# Patient Record
Sex: MALE | Race: WHITE | Employment: OTHER | URBAN - METROPOLITAN AREA
[De-identification: names, ages, dates, MRNs, and addresses within clinical notes are randomized per-mention and may not be internally consistent; named-entity substitution may affect disease eponyms.]

---

## 2017-11-14 PROBLEM — J96.21 ACUTE ON CHRONIC RESPIRATORY FAILURE WITH HYPOXIA AND HYPERCAPNIA (HCC): Status: ACTIVE | Noted: 2017-11-14

## 2017-11-14 PROBLEM — G93.41 ACUTE METABOLIC ENCEPHALOPATHY: Status: ACTIVE | Noted: 2017-11-14

## 2017-11-14 PROBLEM — I50.9 CHF (CONGESTIVE HEART FAILURE) (HCC): Status: ACTIVE | Noted: 2017-11-14

## 2017-11-14 PROBLEM — D64.9 ANEMIA: Status: ACTIVE | Noted: 2017-11-14

## 2017-11-14 PROBLEM — I25.5 ISCHEMIC CARDIOMYOPATHY: Status: ACTIVE | Noted: 2017-11-14

## 2017-11-14 PROBLEM — D72.829 LEUKOCYTOSIS: Status: ACTIVE | Noted: 2017-11-14

## 2017-11-14 PROBLEM — J96.01 ACUTE RESPIRATORY FAILURE WITH HYPOXIA AND HYPERCAPNIA (HCC): Status: ACTIVE | Noted: 2017-11-14

## 2017-11-14 PROBLEM — E11.9 DIABETES (HCC): Status: ACTIVE | Noted: 2017-11-14
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
## 2017-11-14 PROBLEM — R79.89 ELEVATED BRAIN NATRIURETIC PEPTIDE (BNP) LEVEL: Status: ACTIVE | Noted: 2017-11-14

## 2017-11-14 PROBLEM — J96.02 ACUTE RESPIRATORY FAILURE WITH HYPOXIA AND HYPERCAPNIA (HCC): Status: ACTIVE | Noted: 2017-11-14

## 2017-11-14 PROBLEM — Z95.810 ICD (IMPLANTABLE CARDIOVERTER-DEFIBRILLATOR) IN PLACE: Status: ACTIVE | Noted: 2017-11-14

## 2017-11-14 PROBLEM — R77.8 ELEVATED TROPONIN: Status: ACTIVE | Noted: 2017-11-14

## 2017-11-14 PROBLEM — N18.9 CKD (CHRONIC KIDNEY DISEASE): Status: ACTIVE | Noted: 2017-11-14

## 2017-11-14 PROBLEM — I95.9 HYPOTENSION: Status: ACTIVE | Noted: 2017-11-14

## 2017-11-14 PROBLEM — J44.1 COPD EXACERBATION (HCC): Status: ACTIVE | Noted: 2017-11-14

## 2017-11-14 PROBLEM — J96.22 ACUTE ON CHRONIC RESPIRATORY FAILURE WITH HYPOXIA AND HYPERCAPNIA (HCC): Status: ACTIVE | Noted: 2017-11-14

## 2017-11-14 PROBLEM — N17.9 ACUTE RENAL FAILURE (ARF) (HCC): Status: ACTIVE | Noted: 2017-11-14

## 2017-11-16 PROBLEM — N18.30 STAGE 3 CHRONIC KIDNEY DISEASE (HCC): Status: ACTIVE | Noted: 2017-11-14

## 2017-12-13 RX ORDER — SODIUM CHLORIDE, SODIUM LACTATE, POTASSIUM CHLORIDE, CALCIUM CHLORIDE 600; 310; 30; 20 MG/100ML; MG/100ML; MG/100ML; MG/100ML
INJECTION, SOLUTION INTRAVENOUS CONTINUOUS
Status: CANCELLED | OUTPATIENT
Start: 2017-12-13

## 2017-12-14 ENCOUNTER — HOSPITAL ENCOUNTER (OUTPATIENT)
Dept: SURGERY | Age: 82
Discharge: HOME | End: 2017-12-14
Attending: OPHTHALMOLOGY | Admitting: OPHTHALMOLOGY

## 2017-12-22 ENCOUNTER — HOSPITAL ENCOUNTER (OUTPATIENT)
Dept: SURGERY | Age: 82
Discharge: HOME | End: 2018-01-10
Attending: OPHTHALMOLOGY | Admitting: OPHTHALMOLOGY

## 2018-01-05 PROBLEM — N18.30 CKD (CHRONIC KIDNEY DISEASE), STAGE III (HCC): Status: ACTIVE | Noted: 2018-01-05

## 2018-01-09 PROBLEM — J96.22 ACUTE ON CHRONIC RESPIRATORY FAILURE WITH HYPOXIA AND HYPERCAPNIA (HCC): Status: RESOLVED | Noted: 2017-11-14 | Resolved: 2018-01-09

## 2018-01-09 PROBLEM — J96.21 ACUTE ON CHRONIC RESPIRATORY FAILURE WITH HYPOXIA AND HYPERCAPNIA (HCC): Status: RESOLVED | Noted: 2017-11-14 | Resolved: 2018-01-09

## 2018-01-11 ENCOUNTER — TELEPHONE (OUTPATIENT)
Dept: TELEMETRY | Age: 83
End: 2018-01-11

## 2018-01-11 NOTE — TELEPHONE ENCOUNTER
[address]    SYMPTOM ASSESSMENT: Post discharge follow-up phone call made to patient. Patient denies shortness of breath, chest pain, edema, or difficulty sleeping; stated he has been feeling \"just fine\" since discharge. Documented discharge weight was 222 lbs (although I question accuracy based on previous hospital weights); today's weight on patient's home scale was 233 lbs. Patient states he did trip and fall this morning over a cart in the kitchen; denies loss of consciousness, denies dizziness; commented he has \"a few scrapes\" but denies any other concerns. Patient states his blood sugars have been controlled, reports recent reading at 125 and 130. MEDICATION REVIEW: Patient was able to fill all prescriptions and states he has been taking medications as prescribed. Reviewed patient medications; no discrepancies identified. FOLLOW-UP APPOINTMENT: Reminded patient of his appointment with Dr. Fany Suarez scheduled for tomorrow, January 12 at 11:15 AM. Patient has arranged transportation to scheduled appointment with his daughter. Patient states he has not yet heard from [facility]. EDUCATION: Educated patient on sodium restriction of < 3,000 mg and fluid restriction of < 64 oz, daily weights, follow-up, and medication compliance. Reviewed recommended level of activity. Notified patient to call the doctor post discharge if he experiences shortness of breath, chest pain, swelling, cough, or weight gain or loss of 2-3 pounds in a day/five pounds in a week. Also notified patient to call the doctor if he feels dizzy, increased fatigue, decreased or difficulty urinating. Pt verbalized understanding; stated he will call the doctor with any questions or signs of symptom worsening. No additional questions at this time.  HF resource number made available for non-urgent questions.    ------------------    Additional call placed to [facility]; spoke with Ella Reyes who states patient is
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
## 2018-04-12 PROBLEM — R77.8 ELEVATED TROPONIN: Status: RESOLVED | Noted: 2017-11-14 | Resolved: 2018-04-12

## 2018-04-20 PROBLEM — I50.43 CHF (CONGESTIVE HEART FAILURE), NYHA CLASS I, ACUTE ON CHRONIC, COMBINED (HCC): Status: ACTIVE | Noted: 2018-04-20

## 2018-04-27 ENCOUNTER — TELEPHONE (OUTPATIENT)
Dept: CARDIAC REHAB | Age: 83
End: 2018-04-27

## 2018-05-10 PROBLEM — I50.43 ACUTE ON CHRONIC COMBINED SYSTOLIC AND DIASTOLIC CHF (CONGESTIVE HEART FAILURE) (HCC): Status: ACTIVE | Noted: 2018-05-10

## 2018-05-11 ENCOUNTER — PROCEDURE VISIT (OUTPATIENT)
Dept: CARDIOLOGY CLINIC | Age: 83
End: 2018-05-11

## 2018-05-11 DIAGNOSIS — Z95.810 ICD (IMPLANTABLE CARDIOVERTER-DEFIBRILLATOR) IN PLACE: Primary | ICD-10-CM

## 2018-05-11 PROBLEM — Z99.81 ON HOME O2: Status: ACTIVE | Noted: 2018-05-11

## 2018-05-11 PROBLEM — J96.11 CHRONIC RESPIRATORY FAILURE WITH HYPOXIA (HCC): Status: ACTIVE | Noted: 2018-05-11

## 2018-05-11 PROBLEM — J44.9 COPD (CHRONIC OBSTRUCTIVE PULMONARY DISEASE) (HCC): Status: ACTIVE | Noted: 2018-05-11

## 2018-05-13 PROBLEM — T17.500A MUCUS PLUGGING OF BRONCHI: Status: ACTIVE | Noted: 2018-05-13

## 2018-05-13 PROBLEM — R06.89 INEFFECTIVE AIRWAY CLEARANCE: Status: ACTIVE | Noted: 2018-05-13

## 2018-05-13 PROBLEM — R09.89 PULMONARY CONGESTION: Status: ACTIVE | Noted: 2018-05-13

## 2018-05-13 PROBLEM — N18.9 CHRONIC KIDNEY DISEASE: Status: ACTIVE | Noted: 2018-01-05

## 2018-05-14 PROBLEM — J98.01 BRONCHOSPASM: Status: ACTIVE | Noted: 2018-05-14

## 2018-05-16 ENCOUNTER — TELEPHONE (OUTPATIENT)
Dept: INPATIENT UNIT | Age: 83
End: 2018-05-16

## 2018-05-20 PROBLEM — A41.9 SEPSIS (HCC): Status: ACTIVE | Noted: 2018-05-20

## 2018-05-20 PROBLEM — I25.5 ISCHEMIC CARDIOMYOPATHY: Chronic | Status: ACTIVE | Noted: 2017-11-14

## 2018-05-20 PROBLEM — I95.9 HYPOTENSION: Status: RESOLVED | Noted: 2017-11-14 | Resolved: 2018-05-20

## 2018-05-20 PROBLEM — J98.01 BRONCHOSPASM: Status: RESOLVED | Noted: 2018-05-14 | Resolved: 2018-05-20

## 2018-05-20 PROBLEM — N17.9 ACUTE RENAL FAILURE (HCC): Status: RESOLVED | Noted: 2017-11-14 | Resolved: 2018-05-20

## 2018-05-20 PROBLEM — J96.21 ACUTE ON CHRONIC RESPIRATORY FAILURE WITH HYPOXEMIA (HCC): Status: ACTIVE | Noted: 2018-05-20

## 2018-05-20 PROBLEM — N18.30 CKD (CHRONIC KIDNEY DISEASE) STAGE 3, GFR 30-59 ML/MIN (HCC): Chronic | Status: ACTIVE | Noted: 2018-01-05

## 2018-05-20 PROBLEM — N18.9 ACUTE-ON-CHRONIC RENAL FAILURE (HCC): Status: ACTIVE | Noted: 2018-05-20

## 2018-05-20 PROBLEM — N17.0 ACUTE RENAL FAILURE WITH TUBULAR NECROSIS (HCC): Status: ACTIVE | Noted: 2017-11-14

## 2018-05-20 PROBLEM — J44.9 COPD (CHRONIC OBSTRUCTIVE PULMONARY DISEASE) (HCC): Chronic | Status: ACTIVE | Noted: 2018-05-11

## 2018-05-20 PROBLEM — Z99.81 ON HOME O2: Status: RESOLVED | Noted: 2018-05-11 | Resolved: 2018-05-20

## 2018-05-20 PROBLEM — I50.43 CHF (CONGESTIVE HEART FAILURE), NYHA CLASS I, ACUTE ON CHRONIC, COMBINED (HCC): Status: RESOLVED | Noted: 2018-04-20 | Resolved: 2018-05-20

## 2018-05-20 PROBLEM — R09.89 PULMONARY CONGESTION: Status: RESOLVED | Noted: 2018-05-13 | Resolved: 2018-05-20

## 2018-05-20 PROBLEM — G93.40 ACUTE ENCEPHALOPATHY: Status: ACTIVE | Noted: 2018-05-20

## 2018-05-20 PROBLEM — I51.89 RIGHT VENTRICULAR SYSTOLIC DYSFUNCTION: Chronic | Status: ACTIVE | Noted: 2018-05-20

## 2018-05-20 PROBLEM — D72.829 LEUKOCYTOSIS: Status: RESOLVED | Noted: 2017-11-14 | Resolved: 2018-05-20

## 2018-05-20 PROBLEM — J96.11 CHRONIC HYPOXEMIC RESPIRATORY FAILURE (HCC): Chronic | Status: ACTIVE | Noted: 2018-05-11

## 2018-05-20 PROBLEM — J44.1 COPD EXACERBATION (HCC): Status: RESOLVED | Noted: 2017-11-14 | Resolved: 2018-05-20

## 2018-05-20 PROBLEM — R65.21 SEPTIC SHOCK (HCC): Status: ACTIVE | Noted: 2018-05-20

## 2018-05-20 PROBLEM — G93.41 ACUTE METABOLIC ENCEPHALOPATHY: Status: RESOLVED | Noted: 2017-11-14 | Resolved: 2018-05-20

## 2018-05-20 PROBLEM — I50.43 ACUTE ON CHRONIC COMBINED SYSTOLIC AND DIASTOLIC CHF (CONGESTIVE HEART FAILURE) (HCC): Status: RESOLVED | Noted: 2018-05-10 | Resolved: 2018-05-20

## 2018-05-20 PROBLEM — I50.9 ACUTE ON CHRONIC CONGESTIVE HEART FAILURE (HCC): Status: RESOLVED | Noted: 2017-11-14 | Resolved: 2018-05-20

## 2018-05-20 PROBLEM — N17.9 ACUTE-ON-CHRONIC RENAL FAILURE (HCC): Status: ACTIVE | Noted: 2018-05-20

## 2018-05-20 PROBLEM — Z87.891 FORMER SMOKER: Chronic | Status: ACTIVE | Noted: 2018-05-20

## 2018-05-20 PROBLEM — N39.0 UTI (URINARY TRACT INFECTION): Status: ACTIVE | Noted: 2018-05-20

## 2018-05-20 PROBLEM — R06.89 INEFFECTIVE AIRWAY CLEARANCE: Status: RESOLVED | Noted: 2018-05-13 | Resolved: 2018-05-20

## 2018-05-20 PROBLEM — Z87.440 HISTORY OF UTI: Chronic | Status: ACTIVE | Noted: 2018-05-20

## 2018-05-20 PROBLEM — T17.500A MUCUS PLUGGING OF BRONCHI: Chronic | Status: ACTIVE | Noted: 2018-05-13

## 2018-05-20 PROBLEM — Z95.810 ICD (IMPLANTABLE CARDIOVERTER-DEFIBRILLATOR) IN PLACE: Chronic | Status: ACTIVE | Noted: 2017-11-14

## 2018-05-20 PROBLEM — D64.9 CHRONIC ANEMIA: Chronic | Status: ACTIVE | Noted: 2017-11-14

## 2018-05-20 PROBLEM — N18.30 STAGE 3 CHRONIC KIDNEY DISEASE (HCC): Status: RESOLVED | Noted: 2017-11-14 | Resolved: 2018-05-20

## 2018-06-20 PROBLEM — N39.0 UTI (URINARY TRACT INFECTION): Status: RESOLVED | Noted: 2018-05-20 | Resolved: 2018-06-20

## 2018-06-20 PROBLEM — R77.8 ELEVATED TROPONIN: Status: RESOLVED | Noted: 2017-11-14 | Resolved: 2018-06-20

## 2018-07-11 ENCOUNTER — HOSPITAL ENCOUNTER (OUTPATIENT)
Dept: OTHER | Age: 83
Discharge: HOME | End: 2018-07-11
Attending: INTERNAL MEDICINE | Admitting: INTERNAL MEDICINE

## 2018-07-11 LAB
ALBUMIN SERPL-MCNC: 3.1 G/DL (ref 3.4–5)
ANION GAP SERPL CALCULATED.3IONS-SCNC: 14 MMOL/L (ref 3–16)
BASOPHILS ABSOLUTE: 0 K/UL (ref 0–0.2)
BASOPHILS RELATIVE PERCENT: 0.1 %
BUN BLDV-MCNC: 21 MG/DL (ref 7–20)
CALCIUM SERPL-MCNC: 8.7 MG/DL (ref 8.3–10.6)
CHLORIDE BLD-SCNC: 101 MMOL/L (ref 99–110)
CO2: 27 MMOL/L (ref 21–32)
CREAT SERPL-MCNC: 1.3 MG/DL (ref 0.8–1.3)
EOSINOPHILS ABSOLUTE: 0.1 K/UL (ref 0–0.6)
EOSINOPHILS RELATIVE PERCENT: 0.8 %
GFR AFRICAN AMERICAN: >60
GFR NON-AFRICAN AMERICAN: 53
GLUCOSE BLD-MCNC: 169 MG/DL (ref 70–99)
HCT VFR BLD CALC: 26.9 % (ref 40.5–52.5)
HEMOGLOBIN: 8.9 G/DL (ref 13.5–17.5)
LYMPHOCYTES ABSOLUTE: 0.9 K/UL (ref 1–5.1)
LYMPHOCYTES RELATIVE PERCENT: 5.3 %
MCH RBC QN AUTO: 29.6 PG (ref 26–34)
MCHC RBC AUTO-ENTMCNC: 33.1 G/DL (ref 31–36)
MCV RBC AUTO: 89.4 FL (ref 80–100)
MONOCYTES ABSOLUTE: 1.5 K/UL (ref 0–1.3)
MONOCYTES RELATIVE PERCENT: 8.8 %
NEUTROPHILS ABSOLUTE: 14.7 K/UL (ref 1.7–7.7)
NEUTROPHILS RELATIVE PERCENT: 85 %
PARATHYROID HORMONE INTACT: 87.6 PG/ML (ref 14–72)
PDW BLD-RTO: 18.5 % (ref 12.4–15.4)
PHOSPHORUS: 2.1 MG/DL (ref 2.5–4.9)
PLATELET # BLD: 315 K/UL (ref 135–450)
PMV BLD AUTO: 8.4 FL (ref 5–10.5)
POTASSIUM SERPL-SCNC: 4.5 MMOL/L (ref 3.5–5.1)
RBC # BLD: 3.01 M/UL (ref 4.2–5.9)
SODIUM BLD-SCNC: 142 MMOL/L (ref 136–145)
URIC ACID, SERUM: 11.8 MG/DL (ref 3.5–7.2)
WBC # BLD: 17.3 K/UL (ref 4–11)

## 2018-08-14 ENCOUNTER — HOSPITAL ENCOUNTER (OUTPATIENT)
Age: 83
Setting detail: SPECIMEN
Discharge: HOME | End: 2018-08-14
Payer: MEDICARE

## 2018-08-14 LAB
ALBUMIN SERPL-MCNC: 3.2 G/DL (ref 3.4–5)
ALP BLD-CCNC: 73 U/L (ref 40–129)
ALT SERPL-CCNC: 13 U/L (ref 10–40)
ANION GAP SERPL CALCULATED.3IONS-SCNC: 13 MMOL/L (ref 3–16)
AST SERPL-CCNC: 20 U/L (ref 15–37)
BACTERIA: ABNORMAL /HPF
BASOPHILS ABSOLUTE: 0.1 K/UL (ref 0–0.2)
BASOPHILS RELATIVE PERCENT: 0.5 %
BILIRUB SERPL-MCNC: 0.3 MG/DL (ref 0–1)
BILIRUBIN DIRECT: <0.2 MG/DL (ref 0–0.3)
BILIRUBIN URINE: NEGATIVE
BILIRUBIN, INDIRECT: NORMAL MG/DL (ref 0–1)
BLOOD, URINE: NEGATIVE
BUN BLDV-MCNC: 24 MG/DL (ref 7–20)
CALCIUM SERPL-MCNC: 8.4 MG/DL (ref 8.3–10.6)
CHLORIDE BLD-SCNC: 95 MMOL/L (ref 99–110)
CLARITY: CLEAR
CO2: 30 MMOL/L (ref 21–32)
COLOR: YELLOW
CREAT SERPL-MCNC: 1.7 MG/DL (ref 0.8–1.3)
CREATININE URINE: 43.5 MG/DL (ref 39–259)
EOSINOPHILS ABSOLUTE: 0.4 K/UL (ref 0–0.6)
EOSINOPHILS RELATIVE PERCENT: 3.3 %
EPITHELIAL CELLS, UA: ABNORMAL /HPF
FERRITIN: 304.4 NG/ML (ref 30–400)
GFR AFRICAN AMERICAN: 47
GFR NON-AFRICAN AMERICAN: 39
GLUCOSE BLD-MCNC: 178 MG/DL (ref 70–99)
GLUCOSE URINE: NEGATIVE MG/DL
HCT VFR BLD CALC: 28.4 % (ref 40.5–52.5)
HEMOGLOBIN: 9.3 G/DL (ref 13.5–17.5)
IRON SATURATION: 32 % (ref 20–50)
IRON: 73 UG/DL (ref 59–158)
KETONES, URINE: NEGATIVE MG/DL
LEUKOCYTE ESTERASE, URINE: NEGATIVE
LYMPHOCYTES ABSOLUTE: 2.4 K/UL (ref 1–5.1)
LYMPHOCYTES RELATIVE PERCENT: 21.9 %
MCH RBC QN AUTO: 30.2 PG (ref 26–34)
MCHC RBC AUTO-ENTMCNC: 32.9 G/DL (ref 31–36)
MCV RBC AUTO: 91.8 FL (ref 80–100)
MICROSCOPIC EXAMINATION: ABNORMAL
MONOCYTES ABSOLUTE: 1.1 K/UL (ref 0–1.3)
MONOCYTES RELATIVE PERCENT: 9.5 %
NEUTROPHILS ABSOLUTE: 7.2 K/UL (ref 1.7–7.7)
NEUTROPHILS RELATIVE PERCENT: 64.8 %
NITRITE, URINE: NEGATIVE
PDW BLD-RTO: 16.8 % (ref 12.4–15.4)
PH UA: 6
PHOSPHORUS: 2.9 MG/DL (ref 2.5–4.9)
PLATELET # BLD: 304 K/UL (ref 135–450)
PMV BLD AUTO: 8.3 FL (ref 5–10.5)
POTASSIUM SERPL-SCNC: 3.5 MMOL/L (ref 3.5–5.1)
PROTEIN PROTEIN: 5 MG/DL
PROTEIN UA: NEGATIVE MG/DL
RBC # BLD: 3.09 M/UL (ref 4.2–5.9)
RBC UA: ABNORMAL /HPF (ref 0–2)
SODIUM BLD-SCNC: 138 MMOL/L (ref 136–145)
SPECIFIC GRAVITY UA: <=1.005
TOTAL IRON BINDING CAPACITY: 225 UG/DL (ref 260–445)
TOTAL PROTEIN: 6.8 G/DL (ref 6.4–8.2)
UROBILINOGEN, URINE: 0.2 E.U./DL
WBC # BLD: 11.1 K/UL (ref 4–11)
WBC UA: ABNORMAL /HPF (ref 0–5)

## 2018-08-14 PROCEDURE — 83550 IRON BINDING TEST: CPT

## 2018-08-14 PROCEDURE — 84156 ASSAY OF PROTEIN URINE: CPT

## 2018-08-14 PROCEDURE — 84100 ASSAY OF PHOSPHORUS: CPT

## 2018-08-14 PROCEDURE — 83540 ASSAY OF IRON: CPT

## 2018-08-14 PROCEDURE — 85025 COMPLETE CBC W/AUTO DIFF WBC: CPT

## 2018-08-14 PROCEDURE — 80076 HEPATIC FUNCTION PANEL: CPT

## 2018-08-14 PROCEDURE — 80048 BASIC METABOLIC PNL TOTAL CA: CPT

## 2018-08-14 PROCEDURE — 36415 COLL VENOUS BLD VENIPUNCTURE: CPT

## 2018-08-14 PROCEDURE — 81001 URINALYSIS AUTO W/SCOPE: CPT

## 2018-08-14 PROCEDURE — 82728 ASSAY OF FERRITIN: CPT

## 2018-08-14 PROCEDURE — 82570 ASSAY OF URINE CREATININE: CPT

## 2018-08-27 ENCOUNTER — APPOINTMENT (OUTPATIENT)
Dept: GENERAL RADIOLOGY | Age: 83
DRG: 315 | End: 2018-08-27
Payer: MEDICARE

## 2018-08-27 ENCOUNTER — APPOINTMENT (OUTPATIENT)
Dept: CT IMAGING | Age: 83
DRG: 315 | End: 2018-08-27
Payer: MEDICARE

## 2018-08-27 ENCOUNTER — HOSPITAL ENCOUNTER (INPATIENT)
Age: 83
LOS: 1 days | Discharge: HOME HEALTH | DRG: 315 | End: 2018-08-29
Attending: EMERGENCY MEDICINE | Admitting: INTERNAL MEDICINE
Payer: MEDICARE

## 2018-08-27 LAB
BASOPHILS ABSOLUTE: 0.1 K/UL (ref 0–0.2)
BASOPHILS RELATIVE PERCENT: 0.9 %
BILIRUBIN URINE: NEGATIVE
BLOOD, URINE: NEGATIVE
CLARITY: CLEAR
COLOR: YELLOW
EOSINOPHILS ABSOLUTE: 0.3 K/UL (ref 0–0.6)
EOSINOPHILS RELATIVE PERCENT: 3 %
GLUCOSE URINE: NEGATIVE MG/DL
HCT VFR BLD CALC: 28.3 % (ref 40.5–52.5)
HEMOGLOBIN: 9.5 G/DL (ref 13.5–17.5)
KETONES, URINE: NEGATIVE MG/DL
LEUKOCYTE ESTERASE, URINE: NEGATIVE
LYMPHOCYTES ABSOLUTE: 1.5 K/UL (ref 1–5.1)
LYMPHOCYTES RELATIVE PERCENT: 13 %
MCH RBC QN AUTO: 29.5 PG (ref 26–34)
MCHC RBC AUTO-ENTMCNC: 33.5 G/DL (ref 31–36)
MCV RBC AUTO: 88 FL (ref 80–100)
MICROSCOPIC EXAMINATION: NORMAL
MONOCYTES ABSOLUTE: 1.3 K/UL (ref 0–1.3)
MONOCYTES RELATIVE PERCENT: 11.5 %
NEUTROPHILS ABSOLUTE: 8.1 K/UL (ref 1.7–7.7)
NEUTROPHILS RELATIVE PERCENT: 71.6 %
NITRITE, URINE: NEGATIVE
PDW BLD-RTO: 16.6 % (ref 12.4–15.4)
PH UA: 6.5
PLATELET # BLD: 262 K/UL (ref 135–450)
PMV BLD AUTO: 8.3 FL (ref 5–10.5)
PRO-BNP: 1955 PG/ML (ref 0–449)
PROTEIN UA: NEGATIVE MG/DL
RBC # BLD: 3.21 M/UL (ref 4.2–5.9)
SPECIFIC GRAVITY UA: 1.01
TROPONIN: 0.04 NG/ML
URINE TYPE: NORMAL
UROBILINOGEN, URINE: 0.2 E.U./DL
WBC # BLD: 11.3 K/UL (ref 4–11)

## 2018-08-27 PROCEDURE — 70450 CT HEAD/BRAIN W/O DYE: CPT

## 2018-08-27 PROCEDURE — 80053 COMPREHEN METABOLIC PANEL: CPT

## 2018-08-27 PROCEDURE — 82803 BLOOD GASES ANY COMBINATION: CPT

## 2018-08-27 PROCEDURE — 94761 N-INVAS EAR/PLS OXIMETRY MLT: CPT

## 2018-08-27 PROCEDURE — 36415 COLL VENOUS BLD VENIPUNCTURE: CPT

## 2018-08-27 PROCEDURE — 84484 ASSAY OF TROPONIN QUANT: CPT

## 2018-08-27 PROCEDURE — 83880 ASSAY OF NATRIURETIC PEPTIDE: CPT

## 2018-08-27 PROCEDURE — 2700000000 HC OXYGEN THERAPY PER DAY

## 2018-08-27 PROCEDURE — 81003 URINALYSIS AUTO W/O SCOPE: CPT

## 2018-08-27 PROCEDURE — 83735 ASSAY OF MAGNESIUM: CPT

## 2018-08-27 PROCEDURE — 84100 ASSAY OF PHOSPHORUS: CPT

## 2018-08-27 PROCEDURE — 93005 ELECTROCARDIOGRAM TRACING: CPT | Performed by: EMERGENCY MEDICINE

## 2018-08-27 PROCEDURE — 71045 X-RAY EXAM CHEST 1 VIEW: CPT

## 2018-08-27 PROCEDURE — 85025 COMPLETE CBC W/AUTO DIFF WBC: CPT

## 2018-08-27 PROCEDURE — 99285 EMERGENCY DEPT VISIT HI MDM: CPT

## 2018-08-27 ASSESSMENT — PAIN SCALES - GENERAL: PAINLEVEL_OUTOF10: 10

## 2018-08-27 ASSESSMENT — PAIN DESCRIPTION - PAIN TYPE: TYPE: CHRONIC PAIN

## 2018-08-27 ASSESSMENT — PAIN DESCRIPTION - LOCATION: LOCATION: BACK

## 2018-08-28 PROBLEM — E78.5 HYPERLIPIDEMIA: Status: ACTIVE | Noted: 2018-08-28

## 2018-08-28 PROBLEM — T82.111A PACEMAKER MALFUNCTION, INITIAL ENCOUNTER: Status: ACTIVE | Noted: 2018-08-28

## 2018-08-28 LAB
A/G RATIO: 1.1 (ref 1.1–2.2)
ALBUMIN SERPL-MCNC: 3 G/DL (ref 3.4–5)
ALP BLD-CCNC: 68 U/L (ref 40–129)
ALT SERPL-CCNC: 11 U/L (ref 10–40)
ANION GAP SERPL CALCULATED.3IONS-SCNC: 11 MMOL/L (ref 3–16)
ANION GAP SERPL CALCULATED.3IONS-SCNC: 19 MMOL/L (ref 3–16)
AST SERPL-CCNC: 20 U/L (ref 15–37)
BASE EXCESS VENOUS: 10.6 MMOL/L (ref -3–3)
BASOPHILS ABSOLUTE: 0 K/UL (ref 0–0.2)
BASOPHILS RELATIVE PERCENT: 0.5 %
BILIRUB SERPL-MCNC: <0.2 MG/DL (ref 0–1)
BUN BLDV-MCNC: 50 MG/DL (ref 7–20)
BUN BLDV-MCNC: 50 MG/DL (ref 7–20)
CALCIUM SERPL-MCNC: 8.5 MG/DL (ref 8.3–10.6)
CALCIUM SERPL-MCNC: 9.1 MG/DL (ref 8.3–10.6)
CARBOXYHEMOGLOBIN: 3.1 % (ref 0–1.5)
CHLORIDE BLD-SCNC: 82 MMOL/L (ref 99–110)
CHLORIDE BLD-SCNC: 83 MMOL/L (ref 99–110)
CO2: 32 MMOL/L (ref 21–32)
CO2: 40 MMOL/L (ref 21–32)
CREAT SERPL-MCNC: 1.9 MG/DL (ref 0.8–1.3)
CREAT SERPL-MCNC: 2 MG/DL (ref 0.8–1.3)
EOSINOPHILS ABSOLUTE: 0.3 K/UL (ref 0–0.6)
EOSINOPHILS RELATIVE PERCENT: 3 %
GFR AFRICAN AMERICAN: 39
GFR AFRICAN AMERICAN: 41
GFR NON-AFRICAN AMERICAN: 32
GFR NON-AFRICAN AMERICAN: 34
GLOBULIN: 2.7 G/DL
GLUCOSE BLD-MCNC: 121 MG/DL (ref 70–99)
GLUCOSE BLD-MCNC: 184 MG/DL (ref 70–99)
GLUCOSE BLD-MCNC: 192 MG/DL (ref 70–99)
GLUCOSE BLD-MCNC: 195 MG/DL (ref 70–99)
GLUCOSE BLD-MCNC: 230 MG/DL (ref 70–99)
HCO3 VENOUS: 33.4 MMOL/L (ref 23–29)
HCT VFR BLD CALC: 28.4 % (ref 40.5–52.5)
HEMOGLOBIN: 9.7 G/DL (ref 13.5–17.5)
LYMPHOCYTES ABSOLUTE: 1.4 K/UL (ref 1–5.1)
LYMPHOCYTES RELATIVE PERCENT: 13.4 %
MAGNESIUM: 2.1 MG/DL (ref 1.8–2.4)
MAGNESIUM: 2.2 MG/DL (ref 1.8–2.4)
MCH RBC QN AUTO: 30.1 PG (ref 26–34)
MCHC RBC AUTO-ENTMCNC: 34.1 G/DL (ref 31–36)
MCV RBC AUTO: 88.3 FL (ref 80–100)
METHEMOGLOBIN VENOUS: 0.3 %
MONOCYTES ABSOLUTE: 1.1 K/UL (ref 0–1.3)
MONOCYTES RELATIVE PERCENT: 10.4 %
NEUTROPHILS ABSOLUTE: 7.5 K/UL (ref 1.7–7.7)
NEUTROPHILS RELATIVE PERCENT: 72.7 %
O2 CONTENT, VEN: 14 VOL %
O2 SAT, VEN: 98 %
O2 THERAPY: ABNORMAL
PCO2, VEN: 37.2 MMHG (ref 40–50)
PDW BLD-RTO: 16.7 % (ref 12.4–15.4)
PERFORMED ON: ABNORMAL
PH VENOUS: 7.57 (ref 7.35–7.45)
PHOSPHORUS: 5.5 MG/DL (ref 2.5–4.9)
PLATELET # BLD: 285 K/UL (ref 135–450)
PMV BLD AUTO: 8.6 FL (ref 5–10.5)
PO2, VEN: 125.2 MMHG (ref 25–40)
POTASSIUM REFLEX MAGNESIUM: 3.5 MMOL/L (ref 3.5–5.1)
POTASSIUM SERPL-SCNC: 2.8 MMOL/L (ref 3.5–5.1)
RBC # BLD: 3.22 M/UL (ref 4.2–5.9)
REASON FOR REJECTION: NORMAL
REJECTED TEST: NORMAL
SODIUM BLD-SCNC: 133 MMOL/L (ref 136–145)
SODIUM BLD-SCNC: 134 MMOL/L (ref 136–145)
TCO2 CALC VENOUS: 35 MMOL/L
TOTAL PROTEIN: 5.7 G/DL (ref 6.4–8.2)
WBC # BLD: 10.4 K/UL (ref 4–11)

## 2018-08-28 PROCEDURE — 94664 DEMO&/EVAL PT USE INHALER: CPT

## 2018-08-28 PROCEDURE — 2580000003 HC RX 258: Performed by: INTERNAL MEDICINE

## 2018-08-28 PROCEDURE — 2700000000 HC OXYGEN THERAPY PER DAY

## 2018-08-28 PROCEDURE — 6370000000 HC RX 637 (ALT 250 FOR IP): Performed by: INTERNAL MEDICINE

## 2018-08-28 PROCEDURE — 94150 VITAL CAPACITY TEST: CPT

## 2018-08-28 PROCEDURE — 4B02XSZ MEASUREMENT OF CARDIAC PACEMAKER, EXTERNAL APPROACH: ICD-10-PCS | Performed by: INTERNAL MEDICINE

## 2018-08-28 PROCEDURE — 6360000002 HC RX W HCPCS: Performed by: INTERNAL MEDICINE

## 2018-08-28 PROCEDURE — 94761 N-INVAS EAR/PLS OXIMETRY MLT: CPT

## 2018-08-28 PROCEDURE — 36415 COLL VENOUS BLD VENIPUNCTURE: CPT

## 2018-08-28 PROCEDURE — 94640 AIRWAY INHALATION TREATMENT: CPT

## 2018-08-28 PROCEDURE — 6370000000 HC RX 637 (ALT 250 FOR IP): Performed by: PHYSICIAN ASSISTANT

## 2018-08-28 PROCEDURE — 85025 COMPLETE CBC W/AUTO DIFF WBC: CPT

## 2018-08-28 PROCEDURE — 83735 ASSAY OF MAGNESIUM: CPT

## 2018-08-28 PROCEDURE — 83036 HEMOGLOBIN GLYCOSYLATED A1C: CPT

## 2018-08-28 PROCEDURE — 99223 1ST HOSP IP/OBS HIGH 75: CPT | Performed by: INTERNAL MEDICINE

## 2018-08-28 PROCEDURE — 80048 BASIC METABOLIC PNL TOTAL CA: CPT

## 2018-08-28 PROCEDURE — 1200000000 HC SEMI PRIVATE

## 2018-08-28 RX ORDER — METOCLOPRAMIDE 10 MG/1
5 TABLET ORAL
Status: DISCONTINUED | OUTPATIENT
Start: 2018-08-28 | End: 2018-08-29 | Stop reason: HOSPADM

## 2018-08-28 RX ORDER — IPRATROPIUM BROMIDE AND ALBUTEROL SULFATE 2.5; .5 MG/3ML; MG/3ML
1 SOLUTION RESPIRATORY (INHALATION) EVERY 4 HOURS PRN
Status: DISCONTINUED | OUTPATIENT
Start: 2018-08-28 | End: 2018-08-29 | Stop reason: HOSPADM

## 2018-08-28 RX ORDER — DOCUSATE SODIUM 100 MG/1
100 CAPSULE, LIQUID FILLED ORAL 2 TIMES DAILY PRN
Status: DISCONTINUED | OUTPATIENT
Start: 2018-08-28 | End: 2018-08-29 | Stop reason: HOSPADM

## 2018-08-28 RX ORDER — TRAMADOL HYDROCHLORIDE 50 MG/1
50 TABLET ORAL EVERY 6 HOURS PRN
Status: DISCONTINUED | OUTPATIENT
Start: 2018-08-28 | End: 2018-08-29 | Stop reason: HOSPADM

## 2018-08-28 RX ORDER — LACTULOSE 10 G/15ML
20 SOLUTION ORAL 3 TIMES DAILY
Status: DISCONTINUED | OUTPATIENT
Start: 2018-08-28 | End: 2018-08-29 | Stop reason: HOSPADM

## 2018-08-28 RX ORDER — CLOPIDOGREL BISULFATE 75 MG/1
75 TABLET ORAL DAILY
Status: DISCONTINUED | OUTPATIENT
Start: 2018-08-28 | End: 2018-08-29 | Stop reason: HOSPADM

## 2018-08-28 RX ORDER — LISINOPRIL 5 MG/1
2.5 TABLET ORAL DAILY
Status: DISCONTINUED | OUTPATIENT
Start: 2018-08-28 | End: 2018-08-29 | Stop reason: HOSPADM

## 2018-08-28 RX ORDER — NICOTINE 21 MG/24HR
1 PATCH, TRANSDERMAL 24 HOURS TRANSDERMAL DAILY PRN
Status: DISCONTINUED | OUTPATIENT
Start: 2018-08-28 | End: 2018-08-29 | Stop reason: HOSPADM

## 2018-08-28 RX ORDER — HEPARIN SODIUM 5000 [USP'U]/ML
5000 INJECTION, SOLUTION INTRAVENOUS; SUBCUTANEOUS EVERY 8 HOURS SCHEDULED
Status: DISCONTINUED | OUTPATIENT
Start: 2018-08-28 | End: 2018-08-29 | Stop reason: HOSPADM

## 2018-08-28 RX ORDER — CARVEDILOL 3.12 MG/1
3.12 TABLET ORAL 2 TIMES DAILY
Status: DISCONTINUED | OUTPATIENT
Start: 2018-08-28 | End: 2018-08-29 | Stop reason: HOSPADM

## 2018-08-28 RX ORDER — BISACODYL 10 MG
10 SUPPOSITORY, RECTAL RECTAL DAILY PRN
Status: DISCONTINUED | OUTPATIENT
Start: 2018-08-28 | End: 2018-08-29 | Stop reason: HOSPADM

## 2018-08-28 RX ORDER — POTASSIUM CHLORIDE 750 MG/1
40 TABLET, FILM COATED, EXTENDED RELEASE ORAL ONCE
Status: COMPLETED | OUTPATIENT
Start: 2018-08-28 | End: 2018-08-28

## 2018-08-28 RX ORDER — SODIUM CHLORIDE 0.9 % (FLUSH) 0.9 %
10 SYRINGE (ML) INJECTION EVERY 12 HOURS SCHEDULED
Status: DISCONTINUED | OUTPATIENT
Start: 2018-08-28 | End: 2018-08-29 | Stop reason: HOSPADM

## 2018-08-28 RX ORDER — ACETAMINOPHEN 325 MG/1
650 TABLET ORAL EVERY 4 HOURS PRN
Status: DISCONTINUED | OUTPATIENT
Start: 2018-08-28 | End: 2018-08-29 | Stop reason: HOSPADM

## 2018-08-28 RX ORDER — POTASSIUM CHLORIDE 20 MEQ/1
40 TABLET, EXTENDED RELEASE ORAL 2 TIMES DAILY WITH MEALS
Status: DISPENSED | OUTPATIENT
Start: 2018-08-28 | End: 2018-08-29

## 2018-08-28 RX ORDER — INSULIN GLARGINE 100 [IU]/ML
25 INJECTION, SOLUTION SUBCUTANEOUS NIGHTLY
Status: DISCONTINUED | OUTPATIENT
Start: 2018-08-28 | End: 2018-08-29 | Stop reason: HOSPADM

## 2018-08-28 RX ORDER — DEXTROSE MONOHYDRATE 25 G/50ML
12.5 INJECTION, SOLUTION INTRAVENOUS PRN
Status: DISCONTINUED | OUTPATIENT
Start: 2018-08-28 | End: 2018-08-29 | Stop reason: SDUPTHER

## 2018-08-28 RX ORDER — NICOTINE POLACRILEX 4 MG
15 LOZENGE BUCCAL PRN
Status: DISCONTINUED | OUTPATIENT
Start: 2018-08-28 | End: 2018-08-29 | Stop reason: HOSPADM

## 2018-08-28 RX ORDER — PANTOPRAZOLE SODIUM 40 MG/1
40 TABLET, DELAYED RELEASE ORAL
Status: DISCONTINUED | OUTPATIENT
Start: 2018-08-28 | End: 2018-08-29 | Stop reason: HOSPADM

## 2018-08-28 RX ORDER — ONDANSETRON 2 MG/ML
4 INJECTION INTRAMUSCULAR; INTRAVENOUS EVERY 4 HOURS PRN
Status: DISCONTINUED | OUTPATIENT
Start: 2018-08-28 | End: 2018-08-29 | Stop reason: HOSPADM

## 2018-08-28 RX ORDER — ALBUTEROL SULFATE 90 UG/1
2 AEROSOL, METERED RESPIRATORY (INHALATION) EVERY 6 HOURS PRN
Status: DISCONTINUED | OUTPATIENT
Start: 2018-08-28 | End: 2018-08-28

## 2018-08-28 RX ORDER — ATORVASTATIN CALCIUM 40 MG/1
20 TABLET, FILM COATED ORAL DAILY
Status: DISCONTINUED | OUTPATIENT
Start: 2018-08-28 | End: 2018-08-29 | Stop reason: HOSPADM

## 2018-08-28 RX ORDER — NICOTINE POLACRILEX 4 MG
15 LOZENGE BUCCAL PRN
Status: DISCONTINUED | OUTPATIENT
Start: 2018-08-28 | End: 2018-08-29 | Stop reason: SDUPTHER

## 2018-08-28 RX ORDER — FLUTICASONE PROPIONATE 50 MCG
1 SPRAY, SUSPENSION (ML) NASAL DAILY
Status: DISCONTINUED | OUTPATIENT
Start: 2018-08-28 | End: 2018-08-29 | Stop reason: HOSPADM

## 2018-08-28 RX ORDER — DEXTROSE MONOHYDRATE 50 MG/ML
100 INJECTION, SOLUTION INTRAVENOUS PRN
Status: DISCONTINUED | OUTPATIENT
Start: 2018-08-28 | End: 2018-08-29 | Stop reason: SDUPTHER

## 2018-08-28 RX ORDER — ASPIRIN 81 MG/1
81 TABLET ORAL DAILY
Status: DISCONTINUED | OUTPATIENT
Start: 2018-08-28 | End: 2018-08-29 | Stop reason: HOSPADM

## 2018-08-28 RX ORDER — DEXTROSE MONOHYDRATE 50 MG/ML
100 INJECTION, SOLUTION INTRAVENOUS PRN
Status: DISCONTINUED | OUTPATIENT
Start: 2018-08-28 | End: 2018-08-29 | Stop reason: HOSPADM

## 2018-08-28 RX ORDER — IPRATROPIUM BROMIDE AND ALBUTEROL SULFATE 2.5; .5 MG/3ML; MG/3ML
1 SOLUTION RESPIRATORY (INHALATION)
Status: DISCONTINUED | OUTPATIENT
Start: 2018-08-28 | End: 2018-08-29 | Stop reason: HOSPADM

## 2018-08-28 RX ORDER — SODIUM CHLORIDE 0.9 % (FLUSH) 0.9 %
10 SYRINGE (ML) INJECTION PRN
Status: DISCONTINUED | OUTPATIENT
Start: 2018-08-28 | End: 2018-08-29 | Stop reason: HOSPADM

## 2018-08-28 RX ORDER — ZOLPIDEM TARTRATE 5 MG/1
5 TABLET ORAL NIGHTLY PRN
Status: DISCONTINUED | OUTPATIENT
Start: 2018-08-28 | End: 2018-08-29 | Stop reason: HOSPADM

## 2018-08-28 RX ORDER — GABAPENTIN 300 MG/1
300 CAPSULE ORAL NIGHTLY
Status: DISCONTINUED | OUTPATIENT
Start: 2018-08-28 | End: 2018-08-29 | Stop reason: HOSPADM

## 2018-08-28 RX ORDER — ALBUTEROL SULFATE 90 UG/1
2 AEROSOL, METERED RESPIRATORY (INHALATION) EVERY 4 HOURS PRN
Status: DISCONTINUED | OUTPATIENT
Start: 2018-08-28 | End: 2018-08-29 | Stop reason: HOSPADM

## 2018-08-28 RX ORDER — RANOLAZINE 500 MG/1
500 TABLET, EXTENDED RELEASE ORAL 2 TIMES DAILY
Status: DISCONTINUED | OUTPATIENT
Start: 2018-08-28 | End: 2018-08-29 | Stop reason: HOSPADM

## 2018-08-28 RX ORDER — IPRATROPIUM BROMIDE AND ALBUTEROL SULFATE 2.5; .5 MG/3ML; MG/3ML
1 SOLUTION RESPIRATORY (INHALATION) EVERY 6 HOURS PRN
Status: DISCONTINUED | OUTPATIENT
Start: 2018-08-28 | End: 2018-08-28

## 2018-08-28 RX ORDER — DEXTROSE MONOHYDRATE 25 G/50ML
12.5 INJECTION, SOLUTION INTRAVENOUS PRN
Status: DISCONTINUED | OUTPATIENT
Start: 2018-08-28 | End: 2018-08-29 | Stop reason: HOSPADM

## 2018-08-28 RX ADMIN — INSULIN LISPRO 2 UNITS: 100 INJECTION, SOLUTION INTRAVENOUS; SUBCUTANEOUS at 09:12

## 2018-08-28 RX ADMIN — CLOPIDOGREL BISULFATE 75 MG: 75 TABLET ORAL at 08:51

## 2018-08-28 RX ADMIN — Medication 2 PUFF: at 20:00

## 2018-08-28 RX ADMIN — POTASSIUM CHLORIDE 40 MEQ: 750 TABLET, EXTENDED RELEASE ORAL at 00:44

## 2018-08-28 RX ADMIN — LACTULOSE 20 G: 20 SOLUTION ORAL at 15:35

## 2018-08-28 RX ADMIN — LACTULOSE 20 G: 20 SOLUTION ORAL at 08:53

## 2018-08-28 RX ADMIN — IPRATROPIUM BROMIDE AND ALBUTEROL SULFATE 1 AMPULE: .5; 3 SOLUTION RESPIRATORY (INHALATION) at 20:00

## 2018-08-28 RX ADMIN — TRAMADOL HYDROCHLORIDE 50 MG: 50 TABLET, FILM COATED ORAL at 03:08

## 2018-08-28 RX ADMIN — INSULIN GLARGINE 25 UNITS: 100 INJECTION, SOLUTION SUBCUTANEOUS at 21:14

## 2018-08-28 RX ADMIN — FLUTICASONE PROPIONATE 1 SPRAY: 50 SPRAY, METERED NASAL at 09:26

## 2018-08-28 RX ADMIN — METOCLOPRAMIDE HYDROCHLORIDE 5 MG: 10 TABLET ORAL at 12:02

## 2018-08-28 RX ADMIN — CARVEDILOL 3.12 MG: 3.12 TABLET, FILM COATED ORAL at 08:52

## 2018-08-28 RX ADMIN — IPRATROPIUM BROMIDE AND ALBUTEROL SULFATE 1 AMPULE: .5; 3 SOLUTION RESPIRATORY (INHALATION) at 08:26

## 2018-08-28 RX ADMIN — SODIUM CHLORIDE, PRESERVATIVE FREE 10 ML: 5 INJECTION INTRAVENOUS at 08:57

## 2018-08-28 RX ADMIN — Medication 2 PUFF: at 08:26

## 2018-08-28 RX ADMIN — HEPARIN SODIUM 5000 UNITS: 5000 INJECTION INTRAVENOUS; SUBCUTANEOUS at 15:35

## 2018-08-28 RX ADMIN — LISINOPRIL 2.5 MG: 5 TABLET ORAL at 08:52

## 2018-08-28 RX ADMIN — PANTOPRAZOLE SODIUM 40 MG: 40 TABLET, DELAYED RELEASE ORAL at 06:13

## 2018-08-28 RX ADMIN — ZOLPIDEM TARTRATE 5 MG: 5 TABLET ORAL at 21:12

## 2018-08-28 RX ADMIN — METOCLOPRAMIDE HYDROCHLORIDE 5 MG: 10 TABLET ORAL at 08:57

## 2018-08-28 RX ADMIN — CARVEDILOL 3.12 MG: 3.12 TABLET, FILM COATED ORAL at 21:12

## 2018-08-28 RX ADMIN — RANOLAZINE 500 MG: 500 TABLET, FILM COATED, EXTENDED RELEASE ORAL at 21:12

## 2018-08-28 RX ADMIN — POTASSIUM CHLORIDE 40 MEQ: 750 TABLET, EXTENDED RELEASE ORAL at 03:07

## 2018-08-28 RX ADMIN — GABAPENTIN 300 MG: 300 CAPSULE ORAL at 21:12

## 2018-08-28 RX ADMIN — SODIUM CHLORIDE, PRESERVATIVE FREE 10 ML: 5 INJECTION INTRAVENOUS at 21:12

## 2018-08-28 RX ADMIN — INSULIN LISPRO 4 UNITS: 100 INJECTION, SOLUTION INTRAVENOUS; SUBCUTANEOUS at 12:05

## 2018-08-28 RX ADMIN — ASPIRIN 81 MG: 81 TABLET ORAL at 08:56

## 2018-08-28 RX ADMIN — HEPARIN SODIUM 5000 UNITS: 5000 INJECTION INTRAVENOUS; SUBCUTANEOUS at 06:10

## 2018-08-28 RX ADMIN — RANOLAZINE 500 MG: 500 TABLET, FILM COATED, EXTENDED RELEASE ORAL at 08:51

## 2018-08-28 RX ADMIN — ATORVASTATIN CALCIUM 20 MG: 40 TABLET, FILM COATED ORAL at 08:52

## 2018-08-28 RX ADMIN — HEPARIN SODIUM 5000 UNITS: 5000 INJECTION INTRAVENOUS; SUBCUTANEOUS at 21:13

## 2018-08-28 RX ADMIN — INSULIN LISPRO 1 UNITS: 100 INJECTION, SOLUTION INTRAVENOUS; SUBCUTANEOUS at 21:14

## 2018-08-28 RX ADMIN — POTASSIUM CHLORIDE 40 MEQ: 20 TABLET, EXTENDED RELEASE ORAL at 09:26

## 2018-08-28 ASSESSMENT — PAIN DESCRIPTION - PAIN TYPE
TYPE: CHRONIC PAIN

## 2018-08-28 ASSESSMENT — PAIN DESCRIPTION - LOCATION
LOCATION: BACK
LOCATION: BACK

## 2018-08-28 ASSESSMENT — PAIN SCALES - GENERAL
PAINLEVEL_OUTOF10: 10
PAINLEVEL_OUTOF10: 10
PAINLEVEL_OUTOF10: 0
PAINLEVEL_OUTOF10: 10
PAINLEVEL_OUTOF10: 0
PAINLEVEL_OUTOF10: 10
PAINLEVEL_OUTOF10: 0
PAINLEVEL_OUTOF10: 0

## 2018-08-28 NOTE — PLAN OF CARE
Problem: Serum Glucose Level - Abnormal:  Goal: Ability to maintain appropriate glucose levels will improve  Ability to maintain appropriate glucose levels will improve   Outcome: Ongoing  Pt will have accuchecks before meals and at bedtime with sliding scale insulin in place for coverage. Will continue to monitor pt for signs and symptoms of hypoglycemia and hyperglycemia throughout shift. Pt has hypoglycemia orders in place if needed and is on a general diet.

## 2018-08-28 NOTE — PLAN OF CARE
Problem: OXYGENATION/RESPIRATORY FUNCTION  Goal: Patient will maintain patent airway  Outcome: Ongoing  Patient's EF (Ejection Fraction) is less than 40%    Patient has a past medical history of Acute MI ([facility] Arthritis; CKD (chronic kidney disease), stage III; [facility] Diabetes mellitus ([email or web address] Hyperlipidemia; Hypertension; and Pacemaker. Comorbidities reviewed and education provided. Patient and family's stated goal of care: increase activity tolerance prior to discharge    Patient's current functional capacity:  Marked limitation of physical activity. Comfortable at rest. Less than ordinary activity causes fatigue, palpitation, or dyspnea. Pt resting in bed at this time on 3 L O2. Pt denies shortness of breath. Pt without lower extremity edema. Patient's weights and intake/output reviewed:    Patient Vitals for the past 96 hrs (Last 3 readings):   Weight   08/28/18 0219 210 lb 5.1 oz (95.4 kg)   08/27/18 2337 212 lb (96.2 kg)       Intake/Output Summary (Last 24 hours) at 08/28/18 0601  Last data filed at 08/28/18 0441   Gross per 24 hour   Intake                0 ml   Output              450 ml   Net             -450 ml       Patient provided with education on CHF signs/symptoms, causes, discharge medications, daily weights, low sodium diet, activity, and follow-up. Notified patient to call the doctor post discharge if patient experiences shortness of breath, chest pain, swelling, cough, or weight gain of three pounds in a day/five pounds in a week. Also notified patient to call the doctor with dizziness, increased fatigue, decreased or difficulty urinating. Pt verbalized understanding. No additional questions at this time.     Education Time: 7 Minutes

## 2018-08-28 NOTE — ED PROVIDER NOTES
Number of children: N/A    Years of education: N/A     Occupational History    Not on file. Social History Main Topics    Smoking status: Former Smoker     Packs/day: 2.00     Years: 60.00     Types: Cigarettes     Quit date: 10/14/2013    Smokeless tobacco: Never Used    Alcohol use No    Drug use: No    Sexual activity: Not on file     Other Topics Concern    Not on file     Social History Narrative    No narrative on file     No current facility-administered medications for this encounter. Current Outpatient Prescriptions   Medication Sig Dispense Refill    carvedilol (COREG) 3.125 MG tablet Take 1 tablet by mouth 2 times daily 60 tablet 0    torsemide (DEMADEX) 20 MG tablet Take 1 tablet by mouth daily 30 tablet 0    lactulose (CHRONULAC) 10 GM/15ML solution Take 20 g by mouth 3 times daily      fluticasone (FLONASE) 50 MCG/ACT nasal spray 1 spray by Nasal route daily      hydrocortisone 2.5 % cream Apply topically 2 times daily Apply topically 2 times daily.  metFORMIN (GLUCOPHAGE) 500 MG tablet Take 500 mg by mouth 2 times daily (with meals)      polyethylene glycol (GLYCOLAX) packet Take 17 g by mouth daily as needed for Constipation      ranolazine (RANEXA) 500 MG extended release tablet Take 500 mg by mouth 2 times daily      zolpidem (AMBIEN) 10 MG tablet Take by mouth nightly as needed for Sleep. Jayesh Scott lisinopril (PRINIVIL;ZESTRIL) 2.5 MG tablet Take 1 tablet by mouth daily 30 tablet 3    ammonium lactate (AMLACTIN) 12 % cream Apply topically 2 times daily as needed      esomeprazole (NEXIUM) 40 MG delayed release capsule Take 40 mg by mouth every morning (before breakfast)      insulin glargine (LANTUS) 100 UNIT/ML injection pen Inject 25 Units into the skin nightly      budesonide-formoterol (SYMBICORT) 160-4.5 MCG/ACT AERO Inhale 2 puffs into the lungs 2 times daily      metoclopramide (REGLAN) 10 MG tablet Take 10 mg by mouth 3 times daily (with meals)       ferrous sulfate 325 (65 Fe) MG tablet Take 1 tablet by mouth 2 times daily (with meals) 30 tablet 3    albuterol (PROVENTIL HFA;VENTOLIN HFA) 108 (90 BASE) MCG/ACT inhaler Inhale 2 puffs into the lungs every 6 hours as needed.  aspirin 81 MG tablet Take 81 mg by mouth daily.  clopidogrel (PLAVIX) 75 MG tablet Take 75 mg by mouth daily.  gabapentin (NEURONTIN) 300 MG capsule Take 300 mg by mouth See Admin Instructions. 300 mg nightly and 300 mg daily prn per nephrologist.     Catrachita Brandon ipratropium-albuterol (DUONEB) 0.5-2.5 (3) MG/3ML SOLN nebulizer solution Inhale 1 vial into the lungs every 6 hours as needed.  atorvastatin (LIPITOR) 20 MG tablet Take 20 mg by mouth daily.  nitroGLYCERIN (NITROSTAT) 0.4 MG SL tablet Place 0.4 mg under the tongue every 5 minutes as needed.  Vitamin D (CHOLECALCIFEROL) 1000 UNITS CAPS capsule Take 1,000 Units by mouth daily. Allergies   Allergen Reactions    Iv [Iodides]        REVIEW OF SYSTEMS  10 systems reviewed, pertinent positives per HPI otherwise noted to be negative    PHYSICAL EXAM  BP (!) 131/55   Pulse 94   Temp 98.3 °F (36.8 °C) (Oral)   Resp 20   SpO2 96%   GENERAL APPEARANCE: Awake and alert. Cooperative. No acute  distress. HEAD: Normocephalic. Atraumatic. EYES: PERRL. EOM's grossly intact. ENT: Mucous membranes are moist.   NECK: Supple. No JVD. No tracheal tenderness or deviation. No crepitus. HEART: RRR. No murmurs. LUNGS: Respirations unlabored. CTAB. Good air exchange. Speaking comfortably in full sentences. ABDOMEN: Soft. Non-distended. Non-tender. No guarding or rebound. No midline pulsatile mass. EXTREMITIES:+1 pitting edema noted anteriorly and bilaterally. No unilateral calf pain, redness or swelling. No palpable cords or masses. all extremities equally. All extremities neurovascularly intact. SKIN: Warm and dry. No acute rashes. NEUROLOGICAL: Alert and oriented. CN's 2-12 intact.  No gross facial drooping. Strength 5/5, sensation intact. PSYCHIATRIC: Normal mood and affect. RADIOLOGY  Ct Head Wo Contrast    Result Date: 8/27/2018  EXAMINATION: CT OF THE HEAD WITHOUT CONTRAST  8/27/2018 11:17 pm TECHNIQUE: CT of the head was performed without the administration of intravenous contrast. Dose modulation, iterative reconstruction, and/or weight based adjustment of the mA/kV was utilized to reduce the radiation dose to as low as reasonably achievable. COMPARISON: None. HISTORY: ORDERING SYSTEM PROVIDED HISTORY: somnolence TECHNOLOGIST PROVIDED HISTORY: Has a \"code stroke\" or \"stroke alert\" been called? ->No Ordering Physician Provided Reason for Exam: fatigue--appears pacemaker not capturing per EMS with pauses, denies chest pain SOB Acuity: Acute Type of Exam: Initial Relevant Medical/Surgical History: personal hx of HTN; INSULIN DEPENDENT DIABETES FINDINGS: BRAIN/VENTRICLES: There is volume loss with mild chronic white matter microvascular ischemic disease characterized by periventricular white matter hypodensity. No acute intracranial mass, shift, or bleed is identified. There is no CT evidence of an acute infarct. ORBITS: The visualized portion of the orbits demonstrate no acute abnormality. SINUSES: The visualized paranasal sinuses and mastoid air cells demonstrate no acute abnormality. SOFT TISSUES/SKULL:  No acute abnormality of the visualized skull or soft tissues. No acute intracranial abnormality. Xr Chest Portable    Result Date: 8/27/2018  EXAMINATION: SINGLE XRAY VIEW OF THE CHEST 8/27/2018 10:56 pm COMPARISON: 05/22/2018 chest radiograph HISTORY: ORDERING SYSTEM PROVIDED HISTORY: sob TECHNOLOGIST PROVIDED HISTORY: Reason for exam:->sob Ordering Physician Provided Reason for Exam: fatigue Acuity: Acute Type of Exam: Initial FINDINGS: Small right pleural effusion. Streaky right basilar atelectasis. No pneumothorax. Stable cardiomediastinal contours.   Intact multi lead AICD with left chest generator. Osteoarthrosis of the bilateral shoulders. Small right pleural effusion with right basilar atelectasis. ED COURSE   I have evaluated this patient in collaboration with Dr. Cherylene Denver. Pain control was not required monitoring the emergency department. Triage vitals are stable. CBC without significant leukocytosis. Baseline anemia with hemoglobin at 9.5. Urinalysis unremarkable. VBG without significant abnormality. Bicarb was high at 33.4. Troponin 0.04. Chronically elevated and appears baseline. BNP was approximately 2000. Normal magnesium. Phosphorus elevated at 5.5.  UA unremarkable. Head CT without acute abnormalities. Chest x-ray without acute abnormalities small right pleural effusion. Cardoz did come in to interrogate the patient's pacemaker. Does have an abnormality which appears to be bad atrial lead as there are some abnormal rhythm strips which are obtained from recent history. I spoke with Dr. Jayden Castellanos regarding admission for further evaluation of patient's abnormal pacemaker and fatigue. Admission orders placed. A discussion was had with Mr. Rumalda Siemens regarding fatigue, ED findings, recommendations for follow-up. All questions were answered. Patient and family are in agreement.     MDM  Results for orders placed or performed during the hospital encounter of 08/27/18   CBC Auto Differential   Result Value Ref Range    WBC 11.3 (H) 4.0 - 11.0 K/uL    RBC 3.21 (L) 4.20 - 5.90 M/uL    Hemoglobin 9.5 (L) 13.5 - 17.5 g/dL    Hematocrit 28.3 (L) 40.5 - 52.5 %    MCV 88.0 80.0 - 100.0 fL    MCH 29.5 26.0 - 34.0 pg    MCHC 33.5 31.0 - 36.0 g/dL    RDW 16.6 (H) 12.4 - 15.4 %    Platelets 349 832 - 244 K/uL    MPV 8.3 5.0 - 10.5 fL    Neutrophils % 71.6 %    Lymphocytes % 13.0 %    Monocytes % 11.5 %    Eosinophils % 3.0 %    Basophils % 0.9 %    Neutrophils # 8.1 (H) 1.7 - 7.7 K/uL    Lymphocytes # 1.5 1.0 - 5.1 K/uL    Monocytes # 1.3 0.0 - 1.3 K/uL    Eosinophils # 0.3 exam, laboratory and imaging studies, as well as, emergency department course. Based upon that discussion, we've decided to admit Real Vaughn to the hospital for further observation, evaluation, treatment. Final Impression  1. Malfunction of cardiac pacemaker, initial encounter    2. Other fatigue      Blood pressure 98/62, pulse 84, temperature 98.8 °F (37.1 °C), temperature source Oral, resp. rate 16, height 5' 6\" (1.676 m), weight 210 lb 5.1 oz (95.4 kg), SpO2 100 %.     DISPOSITION  Patient was admitted to the hospital in [place], [place]  08/28/18 7661

## 2018-08-28 NOTE — PROGRESS NOTES
RESPIRATORY THERAPY ASSESSMENT    Name:  Jamie Goddard Record Number:  [id number]  Age: 81 y.o. Gender: male  : 1935  Today's Date:  2018  Room:  [address] [place], [place] [id number]-    Assessment     Is the patient being admitted for a COPD or Asthma exacerbation? No   (If yes the patient will be seen every 4 hours for the first 24 hours and then reassessed)    Patient Admission Diagnosis      Allergies  Allergies   Allergen Reactions    Iv [Iodides]        Minimum Predicted Vital Capacity:     957          Actual Vital Capacity:      Pt refused at this time          Pulmonary History:COPD and CHF/Pulmonary Edema  Home Oxygen Therapy:  3 liters/min via nasal cannula  Home Respiratory Therapy:Albuterol, Albuterol/Ipratropium Bromide HHN and Budesonide/Formoterol    Current Respiratory Therapy:  Albuterol, duoneb, dulera  Treatment Type: Treatment Missed, IS (pt refused IS at this time, pt wants to eat, IS at bedside)       Respiratory Severity Index(RSI)   Patients with orders for inhalation medications, oxygen, or any therapeutic treatment modality will be placed on Respiratory Protocol. They will be assessed with the first treatment and at least every 72 hours thereafter. The following severity scale will be used to determine frequency of treatment intervention.     Smoking History: Pulmonary Disease or Smoking History, Greater than 15 pack year = 2    Social History  Social History   Substance Use Topics    Smoking status: Former Smoker     Packs/day: 2.00     Years: 60.00     Types: Cigarettes     Quit date: 10/14/2013    Smokeless tobacco: Never Used    Alcohol use No       Recent Surgical History: None = 0  Past Surgical History  Past Surgical History:   Procedure Laterality Date    ABDOMEN SURGERY  10/15/13    with j tube and lopez tube    CARDIAC SURGERY  4/10/2013    Biventricular ICD Medtronic    CORONARY ARTERY BYPASS GRAFT      SKIN BIOPSY         Level of Consciousness: Alert, Oriented, and

## 2018-08-28 NOTE — ED PROVIDER NOTES
I independently evaluated and obtained a history and physical on Treva Magaña. All diagnostic, treatment, and disposition assistants were made to myself in conjunction the advanced practice provider. For further details of this patient's emergency department encounter, please see the advanced practice provider's documentation. History: 81 y.o. M with pacemaker in place who presents with fatigue for 1 day. EMS also reports pacemaker not capturing on their monitoring. Physician Exam: Elderly  male in NAD. Irregular rhythm with rate in 80s on my exam. Intact distal pulses. Normal mental status. No focal neurological deficits. MDM:  Patient is hypokalemic to 2.8, has elevated troponin to 0.04, and has malfunction of atrial leads on pacemaker interrogation. Patient to be admitted for further care. FINAL IMPRESSION      1. Malfunction of cardiac pacemaker, initial encounter    2.  Other fatigue               Thierno Hanks MD  08/28/18 3072

## 2018-08-28 NOTE — H&P
Hospital Medicine  History and Physical    PCP: Marlene Campbell MD  Patient Name: Catalino Pascual    Date of Service: Pt seen/examined on 08/28/2018 and Admitted to Inpatient with expected LOS greater than two midnights due to medical therapy    CHIEF COMPLAINT:  Pt c/o fatigue  HISTORY OF PRESENT ILLNESS: Patient is an 60-year-old man with a history of hyperlipidemia, diabetes mellitus, coronary artery disease status post CABG and stents, congestive heart failure and chronic kidney disease. He presents to the emergency room for evaluation following a several day history of increased fatigue and somnolence. His family notes that he has not been doing much, has become very tired over the past several days and seems to have no energy. In the emergency room he was found to have hypokalemia and abnormal pacemaker firing. He is being admitted for further evaluation and treatment. Associated signs and symptoms do not include chest pain, shortness of breath, diaphoresis, edema, orthopnea, paroxysmal nocturnal dyspnea, fever or chills. Past Medical History:        Diagnosis Date    Acute MI ([address]     Arthritis     CKD (chronic kidney disease), stage III     Alberta Dillon MD, [facility], [phone]    COPD (chronic obstructive [facility] disease) ([facility]     Diabetes mellitus ([facility]     Hyperlipidemia     Hypertension     Pacemaker        Past Surgical History:        Procedure Laterality Date    ABDOMEN SURGERY  10/15/13    with j tube and lopez tube    CARDIAC SURGERY  4/10/2013    Biventricular ICD Medtronic    CORONARY ARTERY BYPASS GRAFT      SKIN BIOPSY         Allergies: Iv [iodides]    Medications Prior to Admission:    Prior to Admission medications    Medication Sig Start Date End Date Taking?  Authorizing Provider   carvedilol (COREG) 3.125 MG tablet Take 1 tablet by mouth 2 times daily 5/24/18 6/23/18  Enid Stephen MD   torsemide (DEMADEX) 20 MG tablet Take 1 tablet by mouth daily Historical Provider, MD   gabapentin (NEURONTIN) 300 MG capsule Take 300 mg by mouth See Admin Instructions. 300 mg nightly and 300 mg daily prn per nephrologist.    Historical Provider, MD   ipratropium-albuterol (DUONEB) 0.5-2.5 (3) MG/3ML SOLN nebulizer solution Inhale 1 vial into the lungs every 6 hours as needed. Historical Provider, MD   atorvastatin (LIPITOR) 20 MG tablet Take 20 mg by mouth daily. Historical Provider, MD   nitroGLYCERIN (NITROSTAT) 0.4 MG SL tablet Place 0.4 mg under the tongue every 5 minutes as needed. Historical Provider, MD   Vitamin D (CHOLECALCIFEROL) 1000 UNITS CAPS capsule Take 1,000 Units by mouth daily. Historical Provider, MD       Family History:   Family history is negative for accelerated coronary artery disease, diabetes or malignancies. Social History:   TOBACCO:   reports that he quit smoking about 4 years ago. His smoking use included Cigarettes. He has a 120.00 pack-year smoking history. He has never used smokeless tobacco.  ETOH:   reports that he does not drink alcohol. OCCUPATION:      REVIEW OF SYSTEMS:  A full review of systems was performed and is negative except for that which appears in the HPI    Physical Exam:    Vitals: /62   Pulse 89   Temp 97.6 °F (36.4 °C) (Oral)   Resp 18   Ht 5' 6\" (1.676 m)   Wt 210 lb 5.1 oz (95.4 kg)   SpO2 100%   BMI 33.95 kg/m²   General appearance: WD/WN 81y.o. year-old  male who is alert, appears stated age and is cooperative  HEENT: Head: Normocephalic, no lesions, without obvious abnormality. Eye: Normal external eye, conjunctiva, lids cornea, YUN. Ears: Normal external ears. Non-tender. Nose: Normal external nose, mucus membranes and septum. Pharynx: Dental Hygiene adequate. Normal buccal mucosa. Normal pharynx.   Neck: no adenopathy, no carotid bruit, no JVD, supple, symmetrical, trachea midline and thyroid not enlarged, symmetric, no tenderness/mass/nodules  Lungs: clear to auscultation HISTORY: somnolence TECHNOLOGIST PROVIDED HISTORY: Has a \"code stroke\" or \"stroke alert\" been called? ->No Ordering Physician Provided Reason for Exam: fatigue--appears pacemaker not capturing per EMS with pauses, denies chest pain SOB Acuity: Acute Type of Exam: Initial Relevant Medical/Surgical History: personal hx of HTN; INSULIN DEPENDENT DIABETES FINDINGS: BRAIN/VENTRICLES: There is volume loss with mild chronic white matter microvascular ischemic disease characterized by periventricular white matter hypodensity. No acute intracranial mass, shift, or bleed is identified. There is no CT evidence of an acute infarct. ORBITS: The visualized portion of the orbits demonstrate no acute abnormality. SINUSES: The visualized paranasal sinuses and mastoid air cells demonstrate no acute abnormality. SOFT TISSUES/SKULL:  No acute abnormality of the visualized skull or soft tissues. No acute intracranial abnormality. Xr Chest Portable    Result Date: 8/27/2018  EXAMINATION: SINGLE XRAY VIEW OF THE CHEST 8/27/2018 10:56 pm COMPARISON: 05/22/2018 chest radiograph HISTORY: ORDERING SYSTEM PROVIDED HISTORY: sob TECHNOLOGIST PROVIDED HISTORY: Reason for exam:->sob Ordering Physician Provided Reason for Exam: fatigue Acuity: Acute Type of Exam: Initial FINDINGS: Small right pleural effusion. Streaky right basilar atelectasis. No pneumothorax. Stable cardiomediastinal contours. Intact multi lead AICD with left chest generator. Osteoarthrosis of the bilateral shoulders. Small right pleural effusion with right basilar atelectasis.        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)    CKD (chronic kidney disease) stage 3, GFR 30-59 ml/min    COPD (chronic obstructive pulmonary disease) ([facility])    Chronic hypoxemic respiratory

## 2018-08-28 NOTE — PROGRESS NOTES
Nutrition Assessment    Type and Reason for Visit: Initial, Positive Nutrition Screen    Nutrition Recommendations:    · Continue carb control diet  · Encourage protein intakes for wound healing  · Monitor po intakes, nutrition adequacy, weights, pertinent labs, BMs     Malnutrition Assessment:  · Malnutrition Status: At risk for malnutrition  · Context: Acute illness or injury  · Findings of the 6 clinical characteristics of malnutrition (Minimum of 2 out of 6 clinical characteristics is required to make the diagnosis of moderate or severe Protein Calorie Malnutrition based on AND/ASPEN Guidelines):  1. Energy Intake-Less than or equal to 75%, greater than or equal to 5 days    2. Weight Loss-No significant weight loss,    3. Fat Loss-No significant subcutaneous fat loss,    4. Muscle Loss-No significant muscle mass loss,    5. Fluid Accumulation-No significant fluid accumulation,    6.  Strength-Not measured    Nutrition Diagnosis:   · Problem: Increased nutrient needs  · Etiology: related to Increased demand for energy/nutrients due to     Signs and symptoms:  as evidenced by Presence of wounds    Nutrition Assessment:  · Subjective Assessment: + screen for difficulty chewing/swallowing and pressure ulcer or non-healing wound. Pt is a 80 yo male with Hx of CKD, COPD, DM, HTN, HLD, who was admitted following a several day history of increased fatigue and somnolence, pt was found to have hypokalemia and abnormal pacemaker firing in the ER. Currenlty on a carb control diet. Pt reports that his appetite is very good currently, intakes % per EMR. Pt states that PTA his appetite was reduced for a few days. Pt endorses some intentionally weight loss. Pt denied having any difficulty chewing/swallowing. Missing several teeth. Pt noted to have stage 2 pressure ulcer on sacrum per flowsheets. Encouraged protein intakes, pt voiced understanding.    · Nutrition-Focused Physical Findings: BM x1 today  · Wound Type: Pressure Ulcer, Stage II  · Current Nutrition Therapies:  · Oral Diet Orders: Carb Control 4 Carbs/Meal   · Oral Diet intake: %  · Oral Nutrition Supplement (ONS) Orders: None  · ONS intake: Unable to assess  · Anthropometric Measures:  · Ht: 5' 6\" (167.6 cm)   · Current Body Wt: 210 lb 5.1 oz (95.4 kg)  · Ideal Body Wt: 142 lb (64.4 kg)   · BMI Classification: BMI 30.0 - 34.9 Obese Class I  · Comparative Standards (Estimated Nutrition Needs):  · Estimated Daily Total Kcal: 5253-8200  · Estimated Daily Protein (g): 77-97    Estimated Intake vs Estimated Needs: Intake Meets Needs    Nutrition Risk Level: Moderate (2/2 wound)    Nutrition Interventions:   Continue current diet   Continued Inpatient Monitoring    Nutrition Evaluation:   · Evaluation: Goals set   · Goals: Pt will have intakes 50% or greater this admission    · Monitoring: Meal Intake, Wound Healing, Pertinent Labs    See Adult Nutrition Doc Flowsheet for more detail.      Electronically signed by Taty Krishna RD, LUPE on 8/28/18 at 3:15 PM    Contact Number: [phone]

## 2018-08-28 NOTE — PLAN OF CARE
Problem: Falls - Risk of:  Goal: Will remain free from falls  Will remain free from falls   Outcome: Ongoing  Pt will remain free from falls throughout hospital stay. Fall precautions in place, bed alarm on, bed in lowest position with wheels locked and side rails 2/4 up. Room door open and hourly rounding completed. Will continue to monitor throughout shift.

## 2018-08-28 NOTE — PROGRESS NOTES
Device interrogation unchanged since May. Intermittent atrial undersensing. Overall normal device function. Patient will follow up with primary cardiologist and primary EP. EP will sign off but remains available if needed.

## 2018-08-29 VITALS
RESPIRATION RATE: 18 BRPM | HEIGHT: 66 IN | HEART RATE: 84 BPM | WEIGHT: 223.33 LBS | TEMPERATURE: 98.6 F | SYSTOLIC BLOOD PRESSURE: 102 MMHG | BODY MASS INDEX: 35.89 KG/M2 | DIASTOLIC BLOOD PRESSURE: 63 MMHG | OXYGEN SATURATION: 99 %

## 2018-08-29 LAB
ALBUMIN SERPL-MCNC: 2.9 G/DL (ref 3.4–5)
ANION GAP SERPL CALCULATED.3IONS-SCNC: 11 MMOL/L (ref 3–16)
BUN BLDV-MCNC: 41 MG/DL (ref 7–20)
CALCIUM SERPL-MCNC: 9.2 MG/DL (ref 8.3–10.6)
CHLORIDE BLD-SCNC: 91 MMOL/L (ref 99–110)
CO2: 40 MMOL/L (ref 21–32)
CREAT SERPL-MCNC: 1.6 MG/DL (ref 0.8–1.3)
EKG ATRIAL RATE: 250 BPM
EKG DIAGNOSIS: NORMAL
EKG P-R INTERVAL: 56 MS
EKG Q-T INTERVAL: 384 MS
EKG QRS DURATION: 134 MS
EKG QTC CALCULATION (BAZETT): 485 MS
EKG R AXIS: -22 DEGREES
EKG T AXIS: 132 DEGREES
EKG VENTRICULAR RATE: 96 BPM
ESTIMATED AVERAGE GLUCOSE: 125.5 MG/DL
GFR AFRICAN AMERICAN: 50
GFR NON-AFRICAN AMERICAN: 42
GLUCOSE BLD-MCNC: 121 MG/DL (ref 70–99)
GLUCOSE BLD-MCNC: 154 MG/DL (ref 70–99)
GLUCOSE BLD-MCNC: 182 MG/DL (ref 70–99)
HBA1C MFR BLD: 6 %
HCT VFR BLD CALC: 30 % (ref 40.5–52.5)
HEMOGLOBIN: 10.1 G/DL (ref 13.5–17.5)
MCH RBC QN AUTO: 30.2 PG (ref 26–34)
MCHC RBC AUTO-ENTMCNC: 33.7 G/DL (ref 31–36)
MCV RBC AUTO: 89.8 FL (ref 80–100)
PDW BLD-RTO: 17.3 % (ref 12.4–15.4)
PERFORMED ON: ABNORMAL
PERFORMED ON: ABNORMAL
PHOSPHORUS: 3 MG/DL (ref 2.5–4.9)
PLATELET # BLD: 277 K/UL (ref 135–450)
PMV BLD AUTO: 8.6 FL (ref 5–10.5)
POTASSIUM SERPL-SCNC: 3.5 MMOL/L (ref 3.5–5.1)
RBC # BLD: 3.34 M/UL (ref 4.2–5.9)
SODIUM BLD-SCNC: 142 MMOL/L (ref 136–145)
WBC # BLD: 7.4 K/UL (ref 4–11)

## 2018-08-29 PROCEDURE — 6370000000 HC RX 637 (ALT 250 FOR IP): Performed by: INTERNAL MEDICINE

## 2018-08-29 PROCEDURE — 2580000003 HC RX 258: Performed by: INTERNAL MEDICINE

## 2018-08-29 PROCEDURE — 36415 COLL VENOUS BLD VENIPUNCTURE: CPT

## 2018-08-29 PROCEDURE — 80069 RENAL FUNCTION PANEL: CPT

## 2018-08-29 PROCEDURE — 94761 N-INVAS EAR/PLS OXIMETRY MLT: CPT

## 2018-08-29 PROCEDURE — 85027 COMPLETE CBC AUTOMATED: CPT

## 2018-08-29 PROCEDURE — 6360000002 HC RX W HCPCS: Performed by: INTERNAL MEDICINE

## 2018-08-29 PROCEDURE — 93010 ELECTROCARDIOGRAM REPORT: CPT | Performed by: INTERNAL MEDICINE

## 2018-08-29 PROCEDURE — 94640 AIRWAY INHALATION TREATMENT: CPT

## 2018-08-29 PROCEDURE — 2700000000 HC OXYGEN THERAPY PER DAY

## 2018-08-29 RX ADMIN — ATORVASTATIN CALCIUM 20 MG: 40 TABLET, FILM COATED ORAL at 09:03

## 2018-08-29 RX ADMIN — Medication 2 PUFF: at 08:55

## 2018-08-29 RX ADMIN — INSULIN LISPRO 2 UNITS: 100 INJECTION, SOLUTION INTRAVENOUS; SUBCUTANEOUS at 08:23

## 2018-08-29 RX ADMIN — IPRATROPIUM BROMIDE AND ALBUTEROL SULFATE 1 AMPULE: .5; 3 SOLUTION RESPIRATORY (INHALATION) at 13:38

## 2018-08-29 RX ADMIN — METOCLOPRAMIDE HYDROCHLORIDE 5 MG: 10 TABLET ORAL at 08:17

## 2018-08-29 RX ADMIN — CARVEDILOL 3.12 MG: 3.12 TABLET, FILM COATED ORAL at 09:02

## 2018-08-29 RX ADMIN — PANTOPRAZOLE SODIUM 40 MG: 40 TABLET, DELAYED RELEASE ORAL at 08:17

## 2018-08-29 RX ADMIN — HEPARIN SODIUM 5000 UNITS: 5000 INJECTION INTRAVENOUS; SUBCUTANEOUS at 13:59

## 2018-08-29 RX ADMIN — LACTULOSE 20 G: 20 SOLUTION ORAL at 09:02

## 2018-08-29 RX ADMIN — METOCLOPRAMIDE HYDROCHLORIDE 5 MG: 10 TABLET ORAL at 11:48

## 2018-08-29 RX ADMIN — INSULIN LISPRO 2 UNITS: 100 INJECTION, SOLUTION INTRAVENOUS; SUBCUTANEOUS at 11:50

## 2018-08-29 RX ADMIN — FLUTICASONE PROPIONATE 1 SPRAY: 50 SPRAY, METERED NASAL at 08:22

## 2018-08-29 RX ADMIN — CLOPIDOGREL BISULFATE 75 MG: 75 TABLET ORAL at 09:02

## 2018-08-29 RX ADMIN — IPRATROPIUM BROMIDE AND ALBUTEROL SULFATE 1 AMPULE: .5; 3 SOLUTION RESPIRATORY (INHALATION) at 08:55

## 2018-08-29 RX ADMIN — LACTULOSE 20 G: 20 SOLUTION ORAL at 13:59

## 2018-08-29 RX ADMIN — ASPIRIN 81 MG: 81 TABLET ORAL at 09:04

## 2018-08-29 RX ADMIN — RANOLAZINE 500 MG: 500 TABLET, FILM COATED, EXTENDED RELEASE ORAL at 09:03

## 2018-08-29 RX ADMIN — SODIUM CHLORIDE, PRESERVATIVE FREE 10 ML: 5 INJECTION INTRAVENOUS at 08:18

## 2018-08-29 RX ADMIN — LISINOPRIL 2.5 MG: 5 TABLET ORAL at 09:02

## 2018-08-29 RX ADMIN — HEPARIN SODIUM 5000 UNITS: 5000 INJECTION INTRAVENOUS; SUBCUTANEOUS at 08:23

## 2018-08-29 ASSESSMENT — PAIN DESCRIPTION - LOCATION
LOCATION: GENERALIZED

## 2018-08-29 ASSESSMENT — PAIN SCALES - GENERAL
PAINLEVEL_OUTOF10: 9
PAINLEVEL_OUTOF10: 5

## 2018-08-29 ASSESSMENT — PAIN DESCRIPTION - PAIN TYPE
TYPE: CHRONIC PAIN

## 2018-08-30 NOTE — DISCHARGE SUMMARY
WBC 7.4 08/29/2018    HGB 10.1 08/29/2018    HCT 30.0 08/29/2018     08/29/2018       Renal:    Lab Results   Component Value Date     08/29/2018    K 3.5 08/29/2018    K 3.5 08/28/2018    CL 91 08/29/2018    CO2 40 08/29/2018    BUN 41 08/29/2018    CREATININE 1.6 08/29/2018    CALCIUM 9.2 08/29/2018    PHOS 3.0 08/29/2018         Significant Diagnostic Studies    Radiology:   CT HEAD WO CONTRAST   Final Result   No acute intracranial abnormality. XR CHEST PORTABLE   Final Result   Small right pleural effusion with right basilar atelectasis. Consults:     IP CONSULT TO HOSPITALIST  IP CONSULT TO CARDIOLOGY    Disposition: home    Condition at Discharge: Stable    Discharge Instructions/Follow-up:  W/ PCP prn. Code Status:  Full Code    Activity: activity as tolerated    Diet: regular diet      Discharge Medications:     Discharge Medication List as of 8/29/2018  4:40 PM           Details   carvedilol (COREG) 3.125 MG tablet Take 1 tablet by mouth 2 times daily, Disp-60 tablet, R-0Print      lactulose (CHRONULAC) 10 GM/15ML solution Take 20 g by mouth 3 times dailyHistorical Med      fluticasone (FLONASE) 50 MCG/ACT nasal spray 1 spray by Nasal route dailyHistorical Med      hydrocortisone 2.5 % cream Apply topically 2 times daily Apply topically 2 times daily. , Topical, 2 TIMES DAILY, Historical Med      metFORMIN (GLUCOPHAGE) 500 MG tablet Take 500 mg by mouth 2 times daily (with meals)Historical Med      polyethylene glycol (GLYCOLAX) packet Take 17 g by mouth daily as needed for ConstipationHistorical Med      ranolazine (RANEXA) 500 MG extended release tablet Take 500 mg by mouth 2 times dailyHistorical Med      zolpidem (AMBIEN) 10 MG tablet Take by mouth nightly as needed for Sleep. Adela Lamb Historical Med      lisinopril (PRINIVIL;ZESTRIL) 2.5 MG tablet Take 1 tablet by mouth daily, Disp-30 tablet, R-3Print      ammonium lactate (AMLACTIN) 12 % cream Apply topically 2 times daily as needed, Topical, 2 TIMES DAILY PRN, Historical Med      esomeprazole (NEXIUM) 40 MG delayed release capsule Take 40 mg by mouth every morning (before breakfast)Historical Med      insulin glargine (LANTUS) 100 UNIT/ML injection pen Inject 25 Units into the skin nightlyHistorical Med      budesonide-formoterol (SYMBICORT) 160-4.5 MCG/ACT AERO Inhale 2 puffs into the lungs 2 times dailyHistorical Med      metoclopramide (REGLAN) 10 MG tablet Take 10 mg by mouth 3 times daily (with meals) Historical Med      ferrous sulfate 325 (65 Fe) MG tablet Take 1 tablet by mouth 2 times daily (with meals), Disp-30 tablet, R-3Normal      albuterol (PROVENTIL HFA;VENTOLIN HFA) 108 (90 BASE) MCG/ACT inhaler Inhale 2 puffs into the lungs every 6 hours as needed. aspirin 81 MG tablet Take 81 mg by mouth daily. clopidogrel (PLAVIX) 75 MG tablet Take 75 mg by mouth daily. gabapentin (NEURONTIN) 300 MG capsule Take 300 mg by mouth See Admin Instructions. 300 mg nightly and 300 mg daily prn per nephrologist.Historical Med      ipratropium-albuterol (DUONEB) 0.5-2.5 (3) MG/3ML SOLN nebulizer solution Inhale 1 vial into the lungs every 6 hours as needed. atorvastatin (LIPITOR) 20 MG tablet Take 20 mg by mouth daily. nitroGLYCERIN (NITROSTAT) 0.4 MG SL tablet Place 0.4 mg under the tongue every 5 minutes as needed. Vitamin D (CHOLECALCIFEROL) 1000 UNITS CAPS capsule Take 1,000 Units by mouth daily. Time Spent on discharge is more than 30 minutes in the examination, evaluation, counseling and review of medications and discharge plan. Signed:    Mir Driver MD   8/30/2018      Thank you Radha Capone MD for the opportunity to be involved in this patient's care. If you have any questions or concerns please feel free to contact me at [phone].

## 2018-09-05 ENCOUNTER — HOSPITAL ENCOUNTER (OUTPATIENT)
Age: 83
Setting detail: SPECIMEN
Discharge: HOME | End: 2018-09-05
Payer: MEDICARE

## 2018-09-05 LAB
ANION GAP SERPL CALCULATED.3IONS-SCNC: 21 MMOL/L (ref 3–16)
BUN BLDV-MCNC: 50 MG/DL (ref 7–20)
CALCIUM SERPL-MCNC: 9.4 MG/DL (ref 8.3–10.6)
CHLORIDE BLD-SCNC: 88 MMOL/L (ref 99–110)
CO2: 30 MMOL/L (ref 21–32)
CREAT SERPL-MCNC: 2.2 MG/DL (ref 0.8–1.3)
GFR AFRICAN AMERICAN: 35
GFR NON-AFRICAN AMERICAN: 29
GLUCOSE BLD-MCNC: 85 MG/DL (ref 70–99)
POTASSIUM SERPL-SCNC: 3 MMOL/L (ref 3.5–5.1)
PRO-BNP: 1585 PG/ML (ref 0–449)
SODIUM BLD-SCNC: 139 MMOL/L (ref 136–145)

## 2018-09-05 PROCEDURE — 83880 ASSAY OF NATRIURETIC PEPTIDE: CPT

## 2018-09-05 PROCEDURE — 80048 BASIC METABOLIC PNL TOTAL CA: CPT

## 2018-09-05 PROCEDURE — 36415 COLL VENOUS BLD VENIPUNCTURE: CPT

## 2018-11-17 ENCOUNTER — HOSPITAL ENCOUNTER (OUTPATIENT)
Age: 83
Discharge: HOME | End: 2018-11-17
Payer: MEDICARE

## 2018-11-17 LAB
ALBUMIN SERPL-MCNC: 3.7 G/DL (ref 3.4–5)
ANION GAP SERPL CALCULATED.3IONS-SCNC: 11 MMOL/L (ref 3–16)
BASOPHILS ABSOLUTE: 0 K/UL (ref 0–0.2)
BASOPHILS RELATIVE PERCENT: 0.4 %
BUN BLDV-MCNC: 36 MG/DL (ref 7–20)
CALCIUM SERPL-MCNC: 9.1 MG/DL (ref 8.3–10.6)
CHLORIDE BLD-SCNC: 101 MMOL/L (ref 99–110)
CO2: 33 MMOL/L (ref 21–32)
CREAT SERPL-MCNC: 1.8 MG/DL (ref 0.8–1.3)
EOSINOPHILS ABSOLUTE: 0.3 K/UL (ref 0–0.6)
EOSINOPHILS RELATIVE PERCENT: 2.9 %
GFR AFRICAN AMERICAN: 44
GFR NON-AFRICAN AMERICAN: 36
GLUCOSE BLD-MCNC: 198 MG/DL (ref 70–99)
HCT VFR BLD CALC: 30.9 % (ref 40.5–52.5)
HEMOGLOBIN: 10 G/DL (ref 13.5–17.5)
LYMPHOCYTES ABSOLUTE: 1.9 K/UL (ref 1–5.1)
LYMPHOCYTES RELATIVE PERCENT: 17.9 %
MCH RBC QN AUTO: 29.8 PG (ref 26–34)
MCHC RBC AUTO-ENTMCNC: 32.3 G/DL (ref 31–36)
MCV RBC AUTO: 92.2 FL (ref 80–100)
MONOCYTES ABSOLUTE: 1.4 K/UL (ref 0–1.3)
MONOCYTES RELATIVE PERCENT: 13.2 %
NEUTROPHILS ABSOLUTE: 6.9 K/UL (ref 1.7–7.7)
NEUTROPHILS RELATIVE PERCENT: 65.6 %
PDW BLD-RTO: 17.2 % (ref 12.4–15.4)
PHOSPHORUS: 3.9 MG/DL (ref 2.5–4.9)
PLATELET # BLD: 248 K/UL (ref 135–450)
PMV BLD AUTO: 9.3 FL (ref 5–10.5)
POTASSIUM SERPL-SCNC: 4.8 MMOL/L (ref 3.5–5.1)
RBC # BLD: 3.36 M/UL (ref 4.2–5.9)
SODIUM BLD-SCNC: 145 MMOL/L (ref 136–145)
WBC # BLD: 10.6 K/UL (ref 4–11)

## 2018-11-17 PROCEDURE — 80069 RENAL FUNCTION PANEL: CPT

## 2018-11-17 PROCEDURE — 85025 COMPLETE CBC W/AUTO DIFF WBC: CPT

## 2018-11-17 PROCEDURE — 36415 COLL VENOUS BLD VENIPUNCTURE: CPT

## 2019-01-08 ENCOUNTER — APPOINTMENT (OUTPATIENT)
Dept: GENERAL RADIOLOGY | Age: 84
DRG: 291 | End: 2019-01-08
Payer: MEDICARE

## 2019-01-08 ENCOUNTER — HOSPITAL ENCOUNTER (INPATIENT)
Age: 84
LOS: 3 days | Discharge: HOME HEALTH | DRG: 291 | End: 2019-01-11
Attending: EMERGENCY MEDICINE | Admitting: INTERNAL MEDICINE
Payer: MEDICARE

## 2019-01-08 DIAGNOSIS — J81.0 ACUTE PULMONARY EDEMA (HCC): ICD-10-CM

## 2019-01-08 DIAGNOSIS — I50.9 CONGESTIVE HEART FAILURE, UNSPECIFIED HF CHRONICITY, UNSPECIFIED HEART FAILURE TYPE (HCC): ICD-10-CM

## 2019-01-08 DIAGNOSIS — R53.83 FATIGUE, UNSPECIFIED TYPE: Primary | ICD-10-CM

## 2019-01-08 PROBLEM — I50.43 CHF (CONGESTIVE HEART FAILURE), NYHA CLASS III, ACUTE ON CHRONIC, COMBINED (HCC): Status: ACTIVE | Noted: 2019-01-08

## 2019-01-08 LAB
A/G RATIO: 0.9 (ref 1.1–2.2)
ALBUMIN SERPL-MCNC: 3.1 G/DL (ref 3.4–5)
ALP BLD-CCNC: 75 U/L (ref 40–129)
ALT SERPL-CCNC: 22 U/L (ref 10–40)
ANION GAP SERPL CALCULATED.3IONS-SCNC: 11 MMOL/L (ref 3–16)
APTT: 44 SEC (ref 26–36)
AST SERPL-CCNC: 22 U/L (ref 15–37)
BASOPHILS ABSOLUTE: 0 K/UL (ref 0–0.2)
BASOPHILS RELATIVE PERCENT: 0.3 %
BILIRUB SERPL-MCNC: <0.2 MG/DL (ref 0–1)
BILIRUBIN URINE: NEGATIVE
BLOOD, URINE: NEGATIVE
BUN BLDV-MCNC: 24 MG/DL (ref 7–20)
CALCIUM SERPL-MCNC: 8.8 MG/DL (ref 8.3–10.6)
CHLORIDE BLD-SCNC: 97 MMOL/L (ref 99–110)
CLARITY: CLEAR
CO2: 29 MMOL/L (ref 21–32)
COLOR: NORMAL
CREAT SERPL-MCNC: 1.4 MG/DL (ref 0.8–1.3)
EKG ATRIAL RATE: 95 BPM
EKG DIAGNOSIS: NORMAL
EKG P AXIS: 69 DEGREES
EKG P-R INTERVAL: 206 MS
EKG Q-T INTERVAL: 392 MS
EKG QRS DURATION: 146 MS
EKG QTC CALCULATION (BAZETT): 492 MS
EKG R AXIS: -69 DEGREES
EKG T AXIS: 46 DEGREES
EKG VENTRICULAR RATE: 95 BPM
EOSINOPHILS ABSOLUTE: 0.2 K/UL (ref 0–0.6)
EOSINOPHILS RELATIVE PERCENT: 1.3 %
GFR AFRICAN AMERICAN: 59
GFR NON-AFRICAN AMERICAN: 48
GLOBULIN: 3.3 G/DL
GLUCOSE BLD-MCNC: 148 MG/DL (ref 70–99)
GLUCOSE BLD-MCNC: 166 MG/DL (ref 70–99)
GLUCOSE URINE: NEGATIVE MG/DL
HCT VFR BLD CALC: 26.6 % (ref 40.5–52.5)
HEMOGLOBIN: 8.6 G/DL (ref 13.5–17.5)
INR BLD: 1.22 (ref 0.86–1.14)
KETONES, URINE: NEGATIVE MG/DL
LACTIC ACID: 1.7 MMOL/L (ref 0.4–2)
LEUKOCYTE ESTERASE, URINE: NEGATIVE
LYMPHOCYTES ABSOLUTE: 1.2 K/UL (ref 1–5.1)
LYMPHOCYTES RELATIVE PERCENT: 9 %
MCH RBC QN AUTO: 30.7 PG (ref 26–34)
MCHC RBC AUTO-ENTMCNC: 32.5 G/DL (ref 31–36)
MCV RBC AUTO: 94.7 FL (ref 80–100)
MICROSCOPIC EXAMINATION: NORMAL
MONOCYTES ABSOLUTE: 1.2 K/UL (ref 0–1.3)
MONOCYTES RELATIVE PERCENT: 9.2 %
NEUTROPHILS ABSOLUTE: 10.8 K/UL (ref 1.7–7.7)
NEUTROPHILS RELATIVE PERCENT: 80.2 %
NITRITE, URINE: NEGATIVE
OCCULT BLOOD DIAGNOSTIC: NORMAL
PDW BLD-RTO: 18.4 % (ref 12.4–15.4)
PERFORMED ON: ABNORMAL
PH UA: 5.5
PLATELET # BLD: 208 K/UL (ref 135–450)
PMV BLD AUTO: 8 FL (ref 5–10.5)
POTASSIUM SERPL-SCNC: 4.1 MMOL/L (ref 3.5–5.1)
PRO-BNP: 1927 PG/ML (ref 0–449)
PROTEIN UA: NEGATIVE MG/DL
PROTHROMBIN TIME: 13.9 SEC (ref 9.8–13)
RAPID INFLUENZA  B AGN: NEGATIVE
RAPID INFLUENZA A AGN: NEGATIVE
RBC # BLD: 2.81 M/UL (ref 4.2–5.9)
SODIUM BLD-SCNC: 137 MMOL/L (ref 136–145)
SPECIFIC GRAVITY UA: 1.01
TOTAL PROTEIN: 6.4 G/DL (ref 6.4–8.2)
TROPONIN: 0.01 NG/ML
TROPONIN: 0.02 NG/ML
URINE TYPE: NORMAL
UROBILINOGEN, URINE: 0.2 E.U./DL
WBC # BLD: 13.5 K/UL (ref 4–11)

## 2019-01-08 PROCEDURE — 83880 ASSAY OF NATRIURETIC PEPTIDE: CPT

## 2019-01-08 PROCEDURE — 99285 EMERGENCY DEPT VISIT HI MDM: CPT

## 2019-01-08 PROCEDURE — 6370000000 HC RX 637 (ALT 250 FOR IP): Performed by: INTERNAL MEDICINE

## 2019-01-08 PROCEDURE — 94640 AIRWAY INHALATION TREATMENT: CPT

## 2019-01-08 PROCEDURE — 83036 HEMOGLOBIN GLYCOSYLATED A1C: CPT

## 2019-01-08 PROCEDURE — 93005 ELECTROCARDIOGRAM TRACING: CPT | Performed by: EMERGENCY MEDICINE

## 2019-01-08 PROCEDURE — 94761 N-INVAS EAR/PLS OXIMETRY MLT: CPT

## 2019-01-08 PROCEDURE — 85610 PROTHROMBIN TIME: CPT

## 2019-01-08 PROCEDURE — 85730 THROMBOPLASTIN TIME PARTIAL: CPT

## 2019-01-08 PROCEDURE — G0328 FECAL BLOOD SCRN IMMUNOASSAY: HCPCS

## 2019-01-08 PROCEDURE — 1200000000 HC SEMI PRIVATE

## 2019-01-08 PROCEDURE — 87804 INFLUENZA ASSAY W/OPTIC: CPT

## 2019-01-08 PROCEDURE — 71045 X-RAY EXAM CHEST 1 VIEW: CPT

## 2019-01-08 PROCEDURE — 85025 COMPLETE CBC W/AUTO DIFF WBC: CPT

## 2019-01-08 PROCEDURE — 83605 ASSAY OF LACTIC ACID: CPT

## 2019-01-08 PROCEDURE — 96374 THER/PROPH/DIAG INJ IV PUSH: CPT

## 2019-01-08 PROCEDURE — 80053 COMPREHEN METABOLIC PANEL: CPT

## 2019-01-08 PROCEDURE — 6370000000 HC RX 637 (ALT 250 FOR IP): Performed by: NURSE PRACTITIONER

## 2019-01-08 PROCEDURE — 84484 ASSAY OF TROPONIN QUANT: CPT

## 2019-01-08 PROCEDURE — 2580000003 HC RX 258: Performed by: INTERNAL MEDICINE

## 2019-01-08 PROCEDURE — 6360000002 HC RX W HCPCS: Performed by: EMERGENCY MEDICINE

## 2019-01-08 PROCEDURE — 81003 URINALYSIS AUTO W/O SCOPE: CPT

## 2019-01-08 PROCEDURE — 36415 COLL VENOUS BLD VENIPUNCTURE: CPT

## 2019-01-08 PROCEDURE — 6360000002 HC RX W HCPCS: Performed by: INTERNAL MEDICINE

## 2019-01-08 PROCEDURE — 2700000000 HC OXYGEN THERAPY PER DAY

## 2019-01-08 PROCEDURE — 93010 ELECTROCARDIOGRAM REPORT: CPT | Performed by: INTERNAL MEDICINE

## 2019-01-08 RX ORDER — NITROGLYCERIN 0.4 MG/1
0.4 TABLET SUBLINGUAL EVERY 5 MIN PRN
Status: DISCONTINUED | OUTPATIENT
Start: 2019-01-08 | End: 2019-01-11 | Stop reason: HOSPADM

## 2019-01-08 RX ORDER — ASPIRIN 81 MG/1
81 TABLET, CHEWABLE ORAL DAILY
Status: DISCONTINUED | OUTPATIENT
Start: 2019-01-08 | End: 2019-01-11 | Stop reason: HOSPADM

## 2019-01-08 RX ORDER — IPRATROPIUM BROMIDE AND ALBUTEROL SULFATE 2.5; .5 MG/3ML; MG/3ML
1 SOLUTION RESPIRATORY (INHALATION)
Status: DISCONTINUED | OUTPATIENT
Start: 2019-01-09 | End: 2019-01-11 | Stop reason: HOSPADM

## 2019-01-08 RX ORDER — TROLAMINE SALICYLATE 10 G/100G
CREAM TOPICAL 2 TIMES DAILY PRN
Status: DISCONTINUED | OUTPATIENT
Start: 2019-01-08 | End: 2019-01-11 | Stop reason: HOSPADM

## 2019-01-08 RX ORDER — CLOPIDOGREL BISULFATE 75 MG/1
75 TABLET ORAL DAILY
Status: DISCONTINUED | OUTPATIENT
Start: 2019-01-08 | End: 2019-01-09

## 2019-01-08 RX ORDER — SODIUM CHLORIDE 0.9 % (FLUSH) 0.9 %
10 SYRINGE (ML) INJECTION PRN
Status: DISCONTINUED | OUTPATIENT
Start: 2019-01-08 | End: 2019-01-11 | Stop reason: HOSPADM

## 2019-01-08 RX ORDER — INSULIN GLARGINE 100 [IU]/ML
25 INJECTION, SOLUTION SUBCUTANEOUS NIGHTLY
Status: DISCONTINUED | OUTPATIENT
Start: 2019-01-08 | End: 2019-01-11 | Stop reason: HOSPADM

## 2019-01-08 RX ORDER — ATORVASTATIN CALCIUM 40 MG/1
20 TABLET, FILM COATED ORAL DAILY
Status: DISCONTINUED | OUTPATIENT
Start: 2019-01-08 | End: 2019-01-11 | Stop reason: HOSPADM

## 2019-01-08 RX ORDER — HYDROCODONE BITARTRATE AND ACETAMINOPHEN 5; 325 MG/1; MG/1
1 TABLET ORAL EVERY 8 HOURS PRN
Status: DISCONTINUED | OUTPATIENT
Start: 2019-01-08 | End: 2019-01-11 | Stop reason: HOSPADM

## 2019-01-08 RX ORDER — ALBUTEROL SULFATE 90 UG/1
2 AEROSOL, METERED RESPIRATORY (INHALATION) EVERY 4 HOURS PRN
Status: DISCONTINUED | OUTPATIENT
Start: 2019-01-08 | End: 2019-01-11 | Stop reason: HOSPADM

## 2019-01-08 RX ORDER — FERROUS SULFATE 325(65) MG
325 TABLET ORAL 2 TIMES DAILY WITH MEALS
Status: DISCONTINUED | OUTPATIENT
Start: 2019-01-08 | End: 2019-01-11 | Stop reason: HOSPADM

## 2019-01-08 RX ORDER — NICOTINE POLACRILEX 4 MG
15 LOZENGE BUCCAL PRN
Status: DISCONTINUED | OUTPATIENT
Start: 2019-01-08 | End: 2019-01-11 | Stop reason: HOSPADM

## 2019-01-08 RX ORDER — ONDANSETRON 2 MG/ML
4 INJECTION INTRAMUSCULAR; INTRAVENOUS EVERY 6 HOURS PRN
Status: DISCONTINUED | OUTPATIENT
Start: 2019-01-08 | End: 2019-01-11 | Stop reason: HOSPADM

## 2019-01-08 RX ORDER — FLUTICASONE PROPIONATE 50 MCG
1 SPRAY, SUSPENSION (ML) NASAL DAILY
Status: DISCONTINUED | OUTPATIENT
Start: 2019-01-08 | End: 2019-01-11 | Stop reason: HOSPADM

## 2019-01-08 RX ORDER — POLYETHYLENE GLYCOL 3350 17 G/17G
17 POWDER, FOR SOLUTION ORAL DAILY PRN
Status: DISCONTINUED | OUTPATIENT
Start: 2019-01-08 | End: 2019-01-11 | Stop reason: HOSPADM

## 2019-01-08 RX ORDER — DEXTROSE MONOHYDRATE 50 MG/ML
100 INJECTION, SOLUTION INTRAVENOUS PRN
Status: DISCONTINUED | OUTPATIENT
Start: 2019-01-08 | End: 2019-01-11 | Stop reason: HOSPADM

## 2019-01-08 RX ORDER — RANOLAZINE 500 MG/1
500 TABLET, EXTENDED RELEASE ORAL 2 TIMES DAILY
Status: DISCONTINUED | OUTPATIENT
Start: 2019-01-08 | End: 2019-01-11 | Stop reason: HOSPADM

## 2019-01-08 RX ORDER — FUROSEMIDE 10 MG/ML
40 INJECTION INTRAMUSCULAR; INTRAVENOUS ONCE
Status: COMPLETED | OUTPATIENT
Start: 2019-01-08 | End: 2019-01-08

## 2019-01-08 RX ORDER — DEXTROSE MONOHYDRATE 25 G/50ML
12.5 INJECTION, SOLUTION INTRAVENOUS PRN
Status: DISCONTINUED | OUTPATIENT
Start: 2019-01-08 | End: 2019-01-11 | Stop reason: HOSPADM

## 2019-01-08 RX ORDER — FUROSEMIDE 10 MG/ML
40 INJECTION INTRAMUSCULAR; INTRAVENOUS 2 TIMES DAILY
Status: DISCONTINUED | OUTPATIENT
Start: 2019-01-08 | End: 2019-01-08

## 2019-01-08 RX ORDER — SODIUM CHLORIDE 0.9 % (FLUSH) 0.9 %
10 SYRINGE (ML) INJECTION EVERY 12 HOURS SCHEDULED
Status: DISCONTINUED | OUTPATIENT
Start: 2019-01-08 | End: 2019-01-11 | Stop reason: HOSPADM

## 2019-01-08 RX ORDER — ZOLPIDEM TARTRATE 5 MG/1
5 TABLET ORAL NIGHTLY PRN
Status: DISCONTINUED | OUTPATIENT
Start: 2019-01-08 | End: 2019-01-11 | Stop reason: HOSPADM

## 2019-01-08 RX ORDER — PANTOPRAZOLE SODIUM 40 MG/1
40 TABLET, DELAYED RELEASE ORAL
Status: DISCONTINUED | OUTPATIENT
Start: 2019-01-09 | End: 2019-01-11 | Stop reason: HOSPADM

## 2019-01-08 RX ORDER — LACTULOSE 10 G/15ML
20 SOLUTION ORAL 3 TIMES DAILY
Status: DISCONTINUED | OUTPATIENT
Start: 2019-01-08 | End: 2019-01-09

## 2019-01-08 RX ORDER — FUROSEMIDE 10 MG/ML
40 INJECTION INTRAMUSCULAR; INTRAVENOUS 2 TIMES DAILY
Status: DISCONTINUED | OUTPATIENT
Start: 2019-01-09 | End: 2019-01-11

## 2019-01-08 RX ORDER — LISINOPRIL 5 MG/1
2.5 TABLET ORAL DAILY
Status: DISCONTINUED | OUTPATIENT
Start: 2019-01-08 | End: 2019-01-11 | Stop reason: HOSPADM

## 2019-01-08 RX ORDER — CARVEDILOL 3.12 MG/1
3.12 TABLET ORAL 2 TIMES DAILY
Status: DISCONTINUED | OUTPATIENT
Start: 2019-01-08 | End: 2019-01-11 | Stop reason: HOSPADM

## 2019-01-08 RX ORDER — GABAPENTIN 300 MG/1
300 CAPSULE ORAL 2 TIMES DAILY PRN
Status: DISCONTINUED | OUTPATIENT
Start: 2019-01-09 | End: 2019-01-11 | Stop reason: HOSPADM

## 2019-01-08 RX ADMIN — ASPIRIN 81 MG 81 MG: 81 TABLET ORAL at 21:51

## 2019-01-08 RX ADMIN — VITAMIN D, TAB 1000IU (100/BT) 1000 UNITS: 25 TAB at 22:29

## 2019-01-08 RX ADMIN — RANOLAZINE 500 MG: 500 TABLET, FILM COATED, EXTENDED RELEASE ORAL at 21:51

## 2019-01-08 RX ADMIN — LISINOPRIL 2.5 MG: 5 TABLET ORAL at 21:52

## 2019-01-08 RX ADMIN — FLUTICASONE PROPIONATE 1 SPRAY: 50 SPRAY, METERED NASAL at 21:49

## 2019-01-08 RX ADMIN — INSULIN LISPRO 1 UNITS: 100 INJECTION, SOLUTION INTRAVENOUS; SUBCUTANEOUS at 21:59

## 2019-01-08 RX ADMIN — INSULIN GLARGINE 25 UNITS: 100 INJECTION, SOLUTION SUBCUTANEOUS at 21:59

## 2019-01-08 RX ADMIN — ENOXAPARIN SODIUM 30 MG: 30 INJECTION SUBCUTANEOUS at 21:49

## 2019-01-08 RX ADMIN — HYDROCODONE BITARTRATE AND ACETAMINOPHEN 1 TABLET: 5; 325 TABLET ORAL at 23:48

## 2019-01-08 RX ADMIN — CLOPIDOGREL BISULFATE 75 MG: 75 TABLET ORAL at 22:29

## 2019-01-08 RX ADMIN — Medication 2 PUFF: at 22:30

## 2019-01-08 RX ADMIN — ATORVASTATIN CALCIUM 20 MG: 40 TABLET, FILM COATED ORAL at 21:51

## 2019-01-08 RX ADMIN — CARVEDILOL 3.12 MG: 3.12 TABLET, FILM COATED ORAL at 21:53

## 2019-01-08 RX ADMIN — FERROUS SULFATE TAB 325 MG (65 MG ELEMENTAL FE) 325 MG: 325 (65 FE) TAB at 21:51

## 2019-01-08 RX ADMIN — SODIUM CHLORIDE, PRESERVATIVE FREE 10 ML: 5 INJECTION INTRAVENOUS at 22:29

## 2019-01-08 RX ADMIN — LACTULOSE 20 G: 20 SOLUTION ORAL at 21:49

## 2019-01-08 RX ADMIN — FUROSEMIDE 40 MG: 10 INJECTION, SOLUTION INTRAMUSCULAR; INTRAVENOUS at 18:49

## 2019-01-08 ASSESSMENT — PAIN SCALES - GENERAL
PAINLEVEL_OUTOF10: 0
PAINLEVEL_OUTOF10: 6
PAINLEVEL_OUTOF10: 6
PAINLEVEL_OUTOF10: 0
PAINLEVEL_OUTOF10: 6
PAINLEVEL_OUTOF10: 5

## 2019-01-08 ASSESSMENT — PAIN DESCRIPTION - LOCATION
LOCATION: NECK

## 2019-01-08 ASSESSMENT — PAIN DESCRIPTION - PAIN TYPE
TYPE: CHRONIC PAIN
TYPE: CHRONIC PAIN

## 2019-01-09 LAB
ANION GAP SERPL CALCULATED.3IONS-SCNC: 10 MMOL/L (ref 3–16)
BASOPHILS ABSOLUTE: 0 K/UL (ref 0–0.2)
BASOPHILS RELATIVE PERCENT: 0.3 %
BUN BLDV-MCNC: 23 MG/DL (ref 7–20)
CALCIUM SERPL-MCNC: 8.9 MG/DL (ref 8.3–10.6)
CHLORIDE BLD-SCNC: 101 MMOL/L (ref 99–110)
CHOLESTEROL, TOTAL: 125 MG/DL (ref 0–199)
CO2: 30 MMOL/L (ref 21–32)
CREAT SERPL-MCNC: 1.4 MG/DL (ref 0.8–1.3)
EOSINOPHILS ABSOLUTE: 0.3 K/UL (ref 0–0.6)
EOSINOPHILS RELATIVE PERCENT: 2.9 %
ESTIMATED AVERAGE GLUCOSE: 128.4 MG/DL
GFR AFRICAN AMERICAN: 59
GFR NON-AFRICAN AMERICAN: 48
GLUCOSE BLD-MCNC: 170 MG/DL (ref 70–99)
GLUCOSE BLD-MCNC: 180 MG/DL (ref 70–99)
GLUCOSE BLD-MCNC: 199 MG/DL (ref 70–99)
GLUCOSE BLD-MCNC: 240 MG/DL (ref 70–99)
GLUCOSE BLD-MCNC: 92 MG/DL (ref 70–99)
HBA1C MFR BLD: 6.1 %
HCT VFR BLD CALC: 26.5 % (ref 40.5–52.5)
HDLC SERPL-MCNC: 45 MG/DL (ref 40–60)
HEMOGLOBIN: 8.6 G/DL (ref 13.5–17.5)
LDL CHOLESTEROL CALCULATED: 64 MG/DL
LV EF: 25 %
LVEF MODALITY: NORMAL
LYMPHOCYTES ABSOLUTE: 1.5 K/UL (ref 1–5.1)
LYMPHOCYTES RELATIVE PERCENT: 15.5 %
MAGNESIUM: 1.7 MG/DL (ref 1.8–2.4)
MCH RBC QN AUTO: 30.8 PG (ref 26–34)
MCHC RBC AUTO-ENTMCNC: 32.6 G/DL (ref 31–36)
MCV RBC AUTO: 94.3 FL (ref 80–100)
MONOCYTES ABSOLUTE: 1.3 K/UL (ref 0–1.3)
MONOCYTES RELATIVE PERCENT: 13.1 %
NEUTROPHILS ABSOLUTE: 6.8 K/UL (ref 1.7–7.7)
NEUTROPHILS RELATIVE PERCENT: 68.2 %
PDW BLD-RTO: 18.1 % (ref 12.4–15.4)
PERFORMED ON: ABNORMAL
PLATELET # BLD: 158 K/UL (ref 135–450)
PMV BLD AUTO: 9 FL (ref 5–10.5)
POTASSIUM SERPL-SCNC: 3.4 MMOL/L (ref 3.5–5.1)
RBC # BLD: 2.81 M/UL (ref 4.2–5.9)
SODIUM BLD-SCNC: 141 MMOL/L (ref 136–145)
TRIGL SERPL-MCNC: 82 MG/DL (ref 0–150)
TROPONIN: 0.02 NG/ML
VLDLC SERPL CALC-MCNC: 16 MG/DL
WBC # BLD: 10 K/UL (ref 4–11)

## 2019-01-09 PROCEDURE — 97165 OT EVAL LOW COMPLEX 30 MIN: CPT

## 2019-01-09 PROCEDURE — 6370000000 HC RX 637 (ALT 250 FOR IP): Performed by: INTERNAL MEDICINE

## 2019-01-09 PROCEDURE — C8929 TTE W OR WO FOL WCON,DOPPLER: HCPCS

## 2019-01-09 PROCEDURE — 6360000002 HC RX W HCPCS: Performed by: INTERNAL MEDICINE

## 2019-01-09 PROCEDURE — 97530 THERAPEUTIC ACTIVITIES: CPT

## 2019-01-09 PROCEDURE — 97116 GAIT TRAINING THERAPY: CPT

## 2019-01-09 PROCEDURE — 51702 INSERT TEMP BLADDER CATH: CPT

## 2019-01-09 PROCEDURE — 80061 LIPID PANEL: CPT

## 2019-01-09 PROCEDURE — 85025 COMPLETE CBC W/AUTO DIFF WBC: CPT

## 2019-01-09 PROCEDURE — 2700000000 HC OXYGEN THERAPY PER DAY

## 2019-01-09 PROCEDURE — 6370000000 HC RX 637 (ALT 250 FOR IP): Performed by: NURSE PRACTITIONER

## 2019-01-09 PROCEDURE — 6360000002 HC RX W HCPCS: Performed by: NURSE PRACTITIONER

## 2019-01-09 PROCEDURE — 99223 1ST HOSP IP/OBS HIGH 75: CPT | Performed by: INTERNAL MEDICINE

## 2019-01-09 PROCEDURE — 97162 PT EVAL MOD COMPLEX 30 MIN: CPT

## 2019-01-09 PROCEDURE — 2580000003 HC RX 258: Performed by: INTERNAL MEDICINE

## 2019-01-09 PROCEDURE — 94640 AIRWAY INHALATION TREATMENT: CPT

## 2019-01-09 PROCEDURE — 36415 COLL VENOUS BLD VENIPUNCTURE: CPT

## 2019-01-09 PROCEDURE — 1200000000 HC SEMI PRIVATE

## 2019-01-09 PROCEDURE — 84484 ASSAY OF TROPONIN QUANT: CPT

## 2019-01-09 PROCEDURE — 94761 N-INVAS EAR/PLS OXIMETRY MLT: CPT

## 2019-01-09 PROCEDURE — 83735 ASSAY OF MAGNESIUM: CPT

## 2019-01-09 PROCEDURE — 94664 DEMO&/EVAL PT USE INHALER: CPT

## 2019-01-09 PROCEDURE — 80048 BASIC METABOLIC PNL TOTAL CA: CPT

## 2019-01-09 RX ORDER — SPIRONOLACTONE 25 MG/1
25 TABLET ORAL DAILY
Status: DISCONTINUED | OUTPATIENT
Start: 2019-01-09 | End: 2019-01-11 | Stop reason: HOSPADM

## 2019-01-09 RX ADMIN — SPIRONOLACTONE 25 MG: 25 TABLET ORAL at 13:54

## 2019-01-09 RX ADMIN — ENOXAPARIN SODIUM 30 MG: 30 INJECTION SUBCUTANEOUS at 08:48

## 2019-01-09 RX ADMIN — IPRATROPIUM BROMIDE AND ALBUTEROL SULFATE 3 ML: .5; 3 SOLUTION RESPIRATORY (INHALATION) at 15:45

## 2019-01-09 RX ADMIN — SODIUM CHLORIDE, PRESERVATIVE FREE 10 ML: 5 INJECTION INTRAVENOUS at 08:48

## 2019-01-09 RX ADMIN — HYDROCODONE BITARTRATE AND ACETAMINOPHEN 1 TABLET: 5; 325 TABLET ORAL at 11:39

## 2019-01-09 RX ADMIN — RANOLAZINE 500 MG: 500 TABLET, FILM COATED, EXTENDED RELEASE ORAL at 08:48

## 2019-01-09 RX ADMIN — VITAMIN D, TAB 1000IU (100/BT) 1000 UNITS: 25 TAB at 08:48

## 2019-01-09 RX ADMIN — IPRATROPIUM BROMIDE AND ALBUTEROL SULFATE 3 ML: .5; 3 SOLUTION RESPIRATORY (INHALATION) at 07:47

## 2019-01-09 RX ADMIN — FERROUS SULFATE TAB 325 MG (65 MG ELEMENTAL FE) 325 MG: 325 (65 FE) TAB at 08:49

## 2019-01-09 RX ADMIN — FUROSEMIDE 40 MG: 10 INJECTION, SOLUTION INTRAMUSCULAR; INTRAVENOUS at 02:52

## 2019-01-09 RX ADMIN — RANOLAZINE 500 MG: 500 TABLET, FILM COATED, EXTENDED RELEASE ORAL at 21:08

## 2019-01-09 RX ADMIN — INSULIN LISPRO 1 UNITS: 100 INJECTION, SOLUTION INTRAVENOUS; SUBCUTANEOUS at 17:25

## 2019-01-09 RX ADMIN — FLUTICASONE PROPIONATE 1 SPRAY: 50 SPRAY, METERED NASAL at 08:48

## 2019-01-09 RX ADMIN — HYDROCODONE BITARTRATE AND ACETAMINOPHEN 1 TABLET: 5; 325 TABLET ORAL at 21:12

## 2019-01-09 RX ADMIN — FUROSEMIDE 40 MG: 10 INJECTION, SOLUTION INTRAMUSCULAR; INTRAVENOUS at 08:48

## 2019-01-09 RX ADMIN — IPRATROPIUM BROMIDE AND ALBUTEROL SULFATE 3 ML: .5; 3 SOLUTION RESPIRATORY (INHALATION) at 11:29

## 2019-01-09 RX ADMIN — Medication 2 PUFF: at 19:49

## 2019-01-09 RX ADMIN — ASPIRIN 81 MG 81 MG: 81 TABLET ORAL at 08:49

## 2019-01-09 RX ADMIN — IPRATROPIUM BROMIDE AND ALBUTEROL SULFATE 3 ML: .5; 3 SOLUTION RESPIRATORY (INHALATION) at 19:49

## 2019-01-09 RX ADMIN — FERROUS SULFATE TAB 325 MG (65 MG ELEMENTAL FE) 325 MG: 325 (65 FE) TAB at 17:25

## 2019-01-09 RX ADMIN — INSULIN LISPRO 1 UNITS: 100 INJECTION, SOLUTION INTRAVENOUS; SUBCUTANEOUS at 08:53

## 2019-01-09 RX ADMIN — INSULIN GLARGINE 25 UNITS: 100 INJECTION, SOLUTION SUBCUTANEOUS at 21:12

## 2019-01-09 RX ADMIN — ATORVASTATIN CALCIUM 20 MG: 40 TABLET, FILM COATED ORAL at 08:49

## 2019-01-09 RX ADMIN — CARVEDILOL 3.12 MG: 3.12 TABLET, FILM COATED ORAL at 21:08

## 2019-01-09 RX ADMIN — LACTULOSE 20 G: 20 SOLUTION ORAL at 08:48

## 2019-01-09 RX ADMIN — Medication 2 PUFF: at 07:47

## 2019-01-09 RX ADMIN — APIXABAN 2.5 MG: 2.5 TABLET, FILM COATED ORAL at 21:08

## 2019-01-09 RX ADMIN — GABAPENTIN 300 MG: 300 CAPSULE ORAL at 21:12

## 2019-01-09 RX ADMIN — CLOPIDOGREL BISULFATE 75 MG: 75 TABLET ORAL at 08:49

## 2019-01-09 RX ADMIN — INSULIN LISPRO 1 UNITS: 100 INJECTION, SOLUTION INTRAVENOUS; SUBCUTANEOUS at 21:13

## 2019-01-09 RX ADMIN — LISINOPRIL 2.5 MG: 5 TABLET ORAL at 08:49

## 2019-01-09 RX ADMIN — FUROSEMIDE 40 MG: 10 INJECTION, SOLUTION INTRAMUSCULAR; INTRAVENOUS at 21:08

## 2019-01-09 RX ADMIN — PANTOPRAZOLE SODIUM 40 MG: 40 TABLET, DELAYED RELEASE ORAL at 06:44

## 2019-01-09 RX ADMIN — SODIUM CHLORIDE, PRESERVATIVE FREE 10 ML: 5 INJECTION INTRAVENOUS at 21:08

## 2019-01-09 ASSESSMENT — PAIN DESCRIPTION - LOCATION
LOCATION: NECK
LOCATION: NECK

## 2019-01-09 ASSESSMENT — PAIN - FUNCTIONAL ASSESSMENT: PAIN_FUNCTIONAL_ASSESSMENT: PREVENTS OR INTERFERES SOME ACTIVE ACTIVITIES AND ADLS

## 2019-01-09 ASSESSMENT — PAIN DESCRIPTION - PROGRESSION: CLINICAL_PROGRESSION: NOT CHANGED

## 2019-01-09 ASSESSMENT — PAIN SCALES - GENERAL
PAINLEVEL_OUTOF10: 10
PAINLEVEL_OUTOF10: 8

## 2019-01-09 ASSESSMENT — PAIN DESCRIPTION - ONSET: ONSET: ON-GOING

## 2019-01-09 ASSESSMENT — PAIN DESCRIPTION - DIRECTION: RADIATING_TOWARDS: BACK

## 2019-01-09 ASSESSMENT — PAIN DESCRIPTION - ORIENTATION: ORIENTATION: MID

## 2019-01-09 ASSESSMENT — PAIN DESCRIPTION - FREQUENCY: FREQUENCY: CONTINUOUS

## 2019-01-09 ASSESSMENT — PAIN DESCRIPTION - DESCRIPTORS: DESCRIPTORS: ACHING

## 2019-01-09 ASSESSMENT — PAIN DESCRIPTION - PAIN TYPE
TYPE: CHRONIC PAIN
TYPE: CHRONIC PAIN

## 2019-01-10 PROBLEM — I50.23 ACUTE ON CHRONIC SYSTOLIC CONGESTIVE HEART FAILURE (HCC): Status: ACTIVE | Noted: 2019-01-08

## 2019-01-10 LAB
ANION GAP SERPL CALCULATED.3IONS-SCNC: 12 MMOL/L (ref 3–16)
BUN BLDV-MCNC: 25 MG/DL (ref 7–20)
CALCIUM SERPL-MCNC: 9.3 MG/DL (ref 8.3–10.6)
CHLORIDE BLD-SCNC: 99 MMOL/L (ref 99–110)
CO2: 30 MMOL/L (ref 21–32)
CREAT SERPL-MCNC: 1.4 MG/DL (ref 0.8–1.3)
GFR AFRICAN AMERICAN: 59
GFR NON-AFRICAN AMERICAN: 48
GLUCOSE BLD-MCNC: 103 MG/DL (ref 70–99)
GLUCOSE BLD-MCNC: 104 MG/DL (ref 70–99)
GLUCOSE BLD-MCNC: 165 MG/DL (ref 70–99)
GLUCOSE BLD-MCNC: 236 MG/DL (ref 70–99)
PERFORMED ON: ABNORMAL
POTASSIUM SERPL-SCNC: 3.3 MMOL/L (ref 3.5–5.1)
PRO-BNP: 2514 PG/ML (ref 0–449)
SODIUM BLD-SCNC: 141 MMOL/L (ref 136–145)

## 2019-01-10 PROCEDURE — 83880 ASSAY OF NATRIURETIC PEPTIDE: CPT

## 2019-01-10 PROCEDURE — 99233 SBSQ HOSP IP/OBS HIGH 50: CPT | Performed by: NURSE PRACTITIONER

## 2019-01-10 PROCEDURE — 6370000000 HC RX 637 (ALT 250 FOR IP): Performed by: INTERNAL MEDICINE

## 2019-01-10 PROCEDURE — 2580000003 HC RX 258: Performed by: INTERNAL MEDICINE

## 2019-01-10 PROCEDURE — 36415 COLL VENOUS BLD VENIPUNCTURE: CPT

## 2019-01-10 PROCEDURE — 80048 BASIC METABOLIC PNL TOTAL CA: CPT

## 2019-01-10 PROCEDURE — 94761 N-INVAS EAR/PLS OXIMETRY MLT: CPT

## 2019-01-10 PROCEDURE — 6370000000 HC RX 637 (ALT 250 FOR IP): Performed by: NURSE PRACTITIONER

## 2019-01-10 PROCEDURE — 6360000002 HC RX W HCPCS: Performed by: NURSE PRACTITIONER

## 2019-01-10 PROCEDURE — 1200000000 HC SEMI PRIVATE

## 2019-01-10 PROCEDURE — 94640 AIRWAY INHALATION TREATMENT: CPT

## 2019-01-10 PROCEDURE — 2700000000 HC OXYGEN THERAPY PER DAY

## 2019-01-10 RX ORDER — POTASSIUM CHLORIDE 20 MEQ/1
20 TABLET, EXTENDED RELEASE ORAL ONCE
Status: COMPLETED | OUTPATIENT
Start: 2019-01-10 | End: 2019-01-10

## 2019-01-10 RX ORDER — CYCLOBENZAPRINE HCL 10 MG
10 TABLET ORAL 3 TIMES DAILY PRN
Status: DISCONTINUED | OUTPATIENT
Start: 2019-01-10 | End: 2019-01-11 | Stop reason: HOSPADM

## 2019-01-10 RX ADMIN — FERROUS SULFATE TAB 325 MG (65 MG ELEMENTAL FE) 325 MG: 325 (65 FE) TAB at 08:47

## 2019-01-10 RX ADMIN — FUROSEMIDE 40 MG: 10 INJECTION, SOLUTION INTRAMUSCULAR; INTRAVENOUS at 08:47

## 2019-01-10 RX ADMIN — LISINOPRIL 2.5 MG: 5 TABLET ORAL at 08:46

## 2019-01-10 RX ADMIN — RANOLAZINE 500 MG: 500 TABLET, FILM COATED, EXTENDED RELEASE ORAL at 08:47

## 2019-01-10 RX ADMIN — ATORVASTATIN CALCIUM 20 MG: 40 TABLET, FILM COATED ORAL at 08:47

## 2019-01-10 RX ADMIN — INSULIN LISPRO 1 UNITS: 100 INJECTION, SOLUTION INTRAVENOUS; SUBCUTANEOUS at 22:11

## 2019-01-10 RX ADMIN — IPRATROPIUM BROMIDE AND ALBUTEROL SULFATE 3 ML: .5; 3 SOLUTION RESPIRATORY (INHALATION) at 08:01

## 2019-01-10 RX ADMIN — HYDROCODONE BITARTRATE AND ACETAMINOPHEN 1 TABLET: 5; 325 TABLET ORAL at 08:47

## 2019-01-10 RX ADMIN — Medication 2 PUFF: at 08:02

## 2019-01-10 RX ADMIN — IPRATROPIUM BROMIDE AND ALBUTEROL SULFATE 3 ML: .5; 3 SOLUTION RESPIRATORY (INHALATION) at 12:13

## 2019-01-10 RX ADMIN — APIXABAN 2.5 MG: 2.5 TABLET, FILM COATED ORAL at 22:10

## 2019-01-10 RX ADMIN — FUROSEMIDE 40 MG: 10 INJECTION, SOLUTION INTRAMUSCULAR; INTRAVENOUS at 22:10

## 2019-01-10 RX ADMIN — INSULIN LISPRO 2 UNITS: 100 INJECTION, SOLUTION INTRAVENOUS; SUBCUTANEOUS at 13:36

## 2019-01-10 RX ADMIN — ASPIRIN 81 MG 81 MG: 81 TABLET ORAL at 08:47

## 2019-01-10 RX ADMIN — PANTOPRAZOLE SODIUM 40 MG: 40 TABLET, DELAYED RELEASE ORAL at 08:47

## 2019-01-10 RX ADMIN — CARVEDILOL 3.12 MG: 3.12 TABLET, FILM COATED ORAL at 08:47

## 2019-01-10 RX ADMIN — IPRATROPIUM BROMIDE AND ALBUTEROL SULFATE 3 ML: .5; 3 SOLUTION RESPIRATORY (INHALATION) at 20:10

## 2019-01-10 RX ADMIN — VITAMIN D, TAB 1000IU (100/BT) 1000 UNITS: 25 TAB at 08:47

## 2019-01-10 RX ADMIN — IPRATROPIUM BROMIDE AND ALBUTEROL SULFATE 3 ML: .5; 3 SOLUTION RESPIRATORY (INHALATION) at 16:11

## 2019-01-10 RX ADMIN — POTASSIUM CHLORIDE 20 MEQ: 20 TABLET, EXTENDED RELEASE ORAL at 13:34

## 2019-01-10 RX ADMIN — INSULIN GLARGINE 25 UNITS: 100 INJECTION, SOLUTION SUBCUTANEOUS at 22:11

## 2019-01-10 RX ADMIN — FLUTICASONE PROPIONATE 1 SPRAY: 50 SPRAY, METERED NASAL at 08:53

## 2019-01-10 RX ADMIN — SODIUM CHLORIDE, PRESERVATIVE FREE 10 ML: 5 INJECTION INTRAVENOUS at 08:46

## 2019-01-10 RX ADMIN — RANOLAZINE 500 MG: 500 TABLET, FILM COATED, EXTENDED RELEASE ORAL at 22:10

## 2019-01-10 RX ADMIN — Medication 2 PUFF: at 20:10

## 2019-01-10 RX ADMIN — SPIRONOLACTONE 25 MG: 25 TABLET ORAL at 08:47

## 2019-01-10 RX ADMIN — APIXABAN 2.5 MG: 2.5 TABLET, FILM COATED ORAL at 08:47

## 2019-01-10 RX ADMIN — CYCLOBENZAPRINE HYDROCHLORIDE 10 MG: 10 TABLET, FILM COATED ORAL at 13:34

## 2019-01-10 RX ADMIN — FERROUS SULFATE TAB 325 MG (65 MG ELEMENTAL FE) 325 MG: 325 (65 FE) TAB at 18:06

## 2019-01-10 RX ADMIN — SODIUM CHLORIDE, PRESERVATIVE FREE 10 ML: 5 INJECTION INTRAVENOUS at 22:10

## 2019-01-10 RX ADMIN — CARVEDILOL 3.12 MG: 3.12 TABLET, FILM COATED ORAL at 22:13

## 2019-01-10 ASSESSMENT — PAIN SCALES - GENERAL
PAINLEVEL_OUTOF10: 10
PAINLEVEL_OUTOF10: 2
PAINLEVEL_OUTOF10: 10
PAINLEVEL_OUTOF10: 0
PAINLEVEL_OUTOF10: 10
PAINLEVEL_OUTOF10: 10

## 2019-01-10 ASSESSMENT — PAIN DESCRIPTION - PAIN TYPE
TYPE: CHRONIC PAIN
TYPE: CHRONIC PAIN

## 2019-01-10 ASSESSMENT — PAIN DESCRIPTION - LOCATION
LOCATION: BACK;NECK
LOCATION: BACK;NECK
LOCATION: NECK

## 2019-01-11 VITALS
OXYGEN SATURATION: 98 % | WEIGHT: 210.54 LBS | RESPIRATION RATE: 16 BRPM | HEIGHT: 67 IN | DIASTOLIC BLOOD PRESSURE: 65 MMHG | HEART RATE: 78 BPM | SYSTOLIC BLOOD PRESSURE: 117 MMHG | TEMPERATURE: 97.9 F | BODY MASS INDEX: 33.04 KG/M2

## 2019-01-11 LAB
ANION GAP SERPL CALCULATED.3IONS-SCNC: 10 MMOL/L (ref 3–16)
BUN BLDV-MCNC: 31 MG/DL (ref 7–20)
CALCIUM SERPL-MCNC: 9.2 MG/DL (ref 8.3–10.6)
CHLORIDE BLD-SCNC: 99 MMOL/L (ref 99–110)
CO2: 31 MMOL/L (ref 21–32)
CREAT SERPL-MCNC: 1.5 MG/DL (ref 0.8–1.3)
GFR AFRICAN AMERICAN: 54
GFR NON-AFRICAN AMERICAN: 45
GLUCOSE BLD-MCNC: 107 MG/DL (ref 70–99)
GLUCOSE BLD-MCNC: 120 MG/DL (ref 70–99)
GLUCOSE BLD-MCNC: 140 MG/DL (ref 70–99)
GLUCOSE BLD-MCNC: 142 MG/DL (ref 70–99)
GLUCOSE BLD-MCNC: 147 MG/DL (ref 70–99)
PERFORMED ON: ABNORMAL
POTASSIUM SERPL-SCNC: 3.7 MMOL/L (ref 3.5–5.1)
SODIUM BLD-SCNC: 140 MMOL/L (ref 136–145)

## 2019-01-11 PROCEDURE — 6370000000 HC RX 637 (ALT 250 FOR IP): Performed by: INTERNAL MEDICINE

## 2019-01-11 PROCEDURE — 94640 AIRWAY INHALATION TREATMENT: CPT

## 2019-01-11 PROCEDURE — 94761 N-INVAS EAR/PLS OXIMETRY MLT: CPT

## 2019-01-11 PROCEDURE — 99232 SBSQ HOSP IP/OBS MODERATE 35: CPT | Performed by: NURSE PRACTITIONER

## 2019-01-11 PROCEDURE — 36415 COLL VENOUS BLD VENIPUNCTURE: CPT

## 2019-01-11 PROCEDURE — 6370000000 HC RX 637 (ALT 250 FOR IP): Performed by: NURSE PRACTITIONER

## 2019-01-11 PROCEDURE — 2700000000 HC OXYGEN THERAPY PER DAY

## 2019-01-11 PROCEDURE — 80048 BASIC METABOLIC PNL TOTAL CA: CPT

## 2019-01-11 RX ORDER — FUROSEMIDE 40 MG/1
40 TABLET ORAL DAILY
Qty: 30 TABLET | Refills: 0 | Status: SHIPPED | OUTPATIENT
Start: 2019-01-11 | End: 2019-10-22 | Stop reason: ALTCHOICE

## 2019-01-11 RX ORDER — FUROSEMIDE 40 MG/1
40 TABLET ORAL DAILY
Status: DISCONTINUED | OUTPATIENT
Start: 2019-01-11 | End: 2019-01-11 | Stop reason: HOSPADM

## 2019-01-11 RX ORDER — SPIRONOLACTONE 25 MG/1
25 TABLET ORAL DAILY
Qty: 30 TABLET | Refills: 0 | Status: ON HOLD | OUTPATIENT
Start: 2019-01-12 | End: 2019-10-27 | Stop reason: HOSPADM

## 2019-01-11 RX ADMIN — LISINOPRIL 2.5 MG: 5 TABLET ORAL at 10:45

## 2019-01-11 RX ADMIN — CARVEDILOL 3.12 MG: 3.12 TABLET, FILM COATED ORAL at 10:45

## 2019-01-11 RX ADMIN — Medication 2 PUFF: at 08:31

## 2019-01-11 RX ADMIN — SPIRONOLACTONE 25 MG: 25 TABLET ORAL at 10:45

## 2019-01-11 RX ADMIN — ATORVASTATIN CALCIUM 20 MG: 40 TABLET, FILM COATED ORAL at 10:46

## 2019-01-11 RX ADMIN — FERROUS SULFATE TAB 325 MG (65 MG ELEMENTAL FE) 325 MG: 325 (65 FE) TAB at 17:29

## 2019-01-11 RX ADMIN — CYCLOBENZAPRINE HYDROCHLORIDE 10 MG: 10 TABLET, FILM COATED ORAL at 14:37

## 2019-01-11 RX ADMIN — PANTOPRAZOLE SODIUM 40 MG: 40 TABLET, DELAYED RELEASE ORAL at 06:29

## 2019-01-11 RX ADMIN — ASPIRIN 81 MG 81 MG: 81 TABLET ORAL at 10:45

## 2019-01-11 RX ADMIN — FUROSEMIDE 40 MG: 40 TABLET ORAL at 17:29

## 2019-01-11 RX ADMIN — APIXABAN 2.5 MG: 2.5 TABLET, FILM COATED ORAL at 10:45

## 2019-01-11 RX ADMIN — RANOLAZINE 500 MG: 500 TABLET, FILM COATED, EXTENDED RELEASE ORAL at 10:45

## 2019-01-11 RX ADMIN — IPRATROPIUM BROMIDE AND ALBUTEROL SULFATE 3 ML: .5; 3 SOLUTION RESPIRATORY (INHALATION) at 08:31

## 2019-01-11 RX ADMIN — VITAMIN D, TAB 1000IU (100/BT) 1000 UNITS: 25 TAB at 10:45

## 2019-01-11 RX ADMIN — HYDROCODONE BITARTRATE AND ACETAMINOPHEN 1 TABLET: 5; 325 TABLET ORAL at 10:54

## 2019-01-11 RX ADMIN — CYCLOBENZAPRINE HYDROCHLORIDE 10 MG: 10 TABLET, FILM COATED ORAL at 06:32

## 2019-01-11 RX ADMIN — FERROUS SULFATE TAB 325 MG (65 MG ELEMENTAL FE) 325 MG: 325 (65 FE) TAB at 10:45

## 2019-01-11 ASSESSMENT — PAIN SCALES - GENERAL
PAINLEVEL_OUTOF10: 6

## 2019-01-11 ASSESSMENT — PAIN DESCRIPTION - PAIN TYPE
TYPE: CHRONIC PAIN

## 2019-01-11 ASSESSMENT — PAIN DESCRIPTION - PROGRESSION
CLINICAL_PROGRESSION: NOT CHANGED

## 2019-01-11 ASSESSMENT — PAIN DESCRIPTION - FREQUENCY: FREQUENCY: INTERMITTENT

## 2019-01-11 ASSESSMENT — PAIN DESCRIPTION - DESCRIPTORS: DESCRIPTORS: ACHING

## 2019-01-11 ASSESSMENT — PAIN DESCRIPTION - LOCATION
LOCATION: NECK

## 2019-01-11 ASSESSMENT — PAIN DESCRIPTION - ONSET: ONSET: ON-GOING

## 2019-01-12 ENCOUNTER — CARE COORDINATION (OUTPATIENT)
Dept: CASE MANAGEMENT | Age: 84
End: 2019-01-12

## 2019-01-17 ENCOUNTER — HOSPITAL ENCOUNTER (OUTPATIENT)
Age: 84
Setting detail: SPECIMEN
Discharge: HOME | End: 2019-01-17
Payer: MEDICARE

## 2019-01-17 LAB
ANION GAP SERPL CALCULATED.3IONS-SCNC: 14 MMOL/L (ref 3–16)
BUN BLDV-MCNC: 44 MG/DL (ref 7–20)
CALCIUM SERPL-MCNC: 9.2 MG/DL (ref 8.3–10.6)
CHLORIDE BLD-SCNC: 96 MMOL/L (ref 99–110)
CO2: 27 MMOL/L (ref 21–32)
CREAT SERPL-MCNC: 1.9 MG/DL (ref 0.8–1.3)
GFR AFRICAN AMERICAN: 41
GFR NON-AFRICAN AMERICAN: 34
GLUCOSE BLD-MCNC: 119 MG/DL (ref 70–99)
POTASSIUM SERPL-SCNC: 5.1 MMOL/L (ref 3.5–5.1)
SODIUM BLD-SCNC: 137 MMOL/L (ref 136–145)

## 2019-01-17 PROCEDURE — 80048 BASIC METABOLIC PNL TOTAL CA: CPT

## 2019-01-17 PROCEDURE — 36415 COLL VENOUS BLD VENIPUNCTURE: CPT

## 2019-01-29 ENCOUNTER — HOSPITAL ENCOUNTER (OUTPATIENT)
Age: 84
Setting detail: SPECIMEN
Discharge: HOME | End: 2019-01-29
Payer: MEDICARE

## 2019-01-29 LAB
BASOPHILS ABSOLUTE: 0 K/UL (ref 0–0.2)
BASOPHILS RELATIVE PERCENT: 0.3 %
BILIRUBIN URINE: NEGATIVE
BLOOD, URINE: NEGATIVE
CLARITY: CLEAR
COLOR: YELLOW
EOSINOPHILS ABSOLUTE: 0.2 K/UL (ref 0–0.6)
EOSINOPHILS RELATIVE PERCENT: 1.4 %
GLUCOSE URINE: NEGATIVE MG/DL
HCT VFR BLD CALC: 29 % (ref 40.5–52.5)
HEMOGLOBIN: 9.4 G/DL (ref 13.5–17.5)
KETONES, URINE: NEGATIVE MG/DL
LEUKOCYTE ESTERASE, URINE: NEGATIVE
LYMPHOCYTES ABSOLUTE: 1.9 K/UL (ref 1–5.1)
LYMPHOCYTES RELATIVE PERCENT: 12.7 %
MCH RBC QN AUTO: 30.8 PG (ref 26–34)
MCHC RBC AUTO-ENTMCNC: 32.3 G/DL (ref 31–36)
MCV RBC AUTO: 95.4 FL (ref 80–100)
MICROSCOPIC EXAMINATION: NORMAL
MONOCYTES ABSOLUTE: 1.4 K/UL (ref 0–1.3)
MONOCYTES RELATIVE PERCENT: 9.2 %
NEUTROPHILS ABSOLUTE: 11.4 K/UL (ref 1.7–7.7)
NEUTROPHILS RELATIVE PERCENT: 76.4 %
NITRITE, URINE: NEGATIVE
PDW BLD-RTO: 18.9 % (ref 12.4–15.4)
PH UA: 5.5
PLATELET # BLD: 228 K/UL (ref 135–450)
PMV BLD AUTO: 9.3 FL (ref 5–10.5)
PROTEIN UA: NEGATIVE MG/DL
RBC # BLD: 3.04 M/UL (ref 4.2–5.9)
SPECIFIC GRAVITY UA: 1.01
URINE TYPE: NORMAL
UROBILINOGEN, URINE: 0.2 E.U./DL
WBC # BLD: 15 K/UL (ref 4–11)

## 2019-01-29 PROCEDURE — 36415 COLL VENOUS BLD VENIPUNCTURE: CPT

## 2019-01-29 PROCEDURE — 87086 URINE CULTURE/COLONY COUNT: CPT

## 2019-01-29 PROCEDURE — 81003 URINALYSIS AUTO W/O SCOPE: CPT

## 2019-01-29 PROCEDURE — 85025 COMPLETE CBC W/AUTO DIFF WBC: CPT

## 2019-01-31 ENCOUNTER — HOSPITAL ENCOUNTER (OUTPATIENT)
Age: 84
Setting detail: SPECIMEN
Discharge: HOME | End: 2019-01-31
Payer: MEDICARE

## 2019-01-31 LAB
ANION GAP SERPL CALCULATED.3IONS-SCNC: 14 MMOL/L (ref 3–16)
BUN BLDV-MCNC: 57 MG/DL (ref 7–20)
CALCIUM SERPL-MCNC: 9.6 MG/DL (ref 8.3–10.6)
CHLORIDE BLD-SCNC: 98 MMOL/L (ref 99–110)
CO2: 30 MMOL/L (ref 21–32)
CREAT SERPL-MCNC: 1.8 MG/DL (ref 0.8–1.3)
GFR AFRICAN AMERICAN: 44
GFR NON-AFRICAN AMERICAN: 36
GLUCOSE BLD-MCNC: 151 MG/DL (ref 70–99)
ORGANISM: ABNORMAL
POTASSIUM SERPL-SCNC: 4.4 MMOL/L (ref 3.5–5.1)
SODIUM BLD-SCNC: 142 MMOL/L (ref 136–145)
URINE CULTURE, ROUTINE: ABNORMAL
URINE CULTURE, ROUTINE: ABNORMAL

## 2019-01-31 PROCEDURE — 36415 COLL VENOUS BLD VENIPUNCTURE: CPT

## 2019-01-31 PROCEDURE — 80048 BASIC METABOLIC PNL TOTAL CA: CPT

## 2019-02-19 ENCOUNTER — HOSPITAL ENCOUNTER (OUTPATIENT)
Age: 84
Discharge: HOME | End: 2019-02-19
Payer: MEDICARE

## 2019-02-19 LAB
ALBUMIN SERPL-MCNC: 4 G/DL (ref 3.4–5)
ANION GAP SERPL CALCULATED.3IONS-SCNC: 15 MMOL/L (ref 3–16)
BILIRUBIN URINE: NEGATIVE
BLOOD, URINE: NEGATIVE
BUN BLDV-MCNC: 37 MG/DL (ref 7–20)
CALCIUM SERPL-MCNC: 9.3 MG/DL (ref 8.3–10.6)
CHLORIDE BLD-SCNC: 95 MMOL/L (ref 99–110)
CLARITY: CLEAR
CO2: 31 MMOL/L (ref 21–32)
COLOR: YELLOW
CREAT SERPL-MCNC: 1.8 MG/DL (ref 0.8–1.3)
CREATININE URINE: 50 MG/DL (ref 39–259)
GFR AFRICAN AMERICAN: 44
GFR NON-AFRICAN AMERICAN: 36
GLUCOSE BLD-MCNC: 203 MG/DL (ref 70–99)
GLUCOSE URINE: NEGATIVE MG/DL
KETONES, URINE: NEGATIVE MG/DL
LEUKOCYTE ESTERASE, URINE: NEGATIVE
MICROSCOPIC EXAMINATION: NORMAL
NITRITE, URINE: NEGATIVE
PH UA: 5.5
PHOSPHORUS: 4.7 MG/DL (ref 2.5–4.9)
POTASSIUM SERPL-SCNC: 4 MMOL/L (ref 3.5–5.1)
PROTEIN PROTEIN: 6 MG/DL
PROTEIN UA: NEGATIVE MG/DL
PROTEIN/CREAT RATIO: 0.1 MG/DL
SODIUM BLD-SCNC: 141 MMOL/L (ref 136–145)
SPECIFIC GRAVITY UA: 1.01
URIC ACID, SERUM: 7.6 MG/DL (ref 3.5–7.2)
URINE TYPE: NORMAL
UROBILINOGEN, URINE: 0.2 E.U./DL

## 2019-02-19 PROCEDURE — 82570 ASSAY OF URINE CREATININE: CPT

## 2019-02-19 PROCEDURE — 82306 VITAMIN D 25 HYDROXY: CPT

## 2019-02-19 PROCEDURE — 83970 ASSAY OF PARATHORMONE: CPT

## 2019-02-19 PROCEDURE — 84156 ASSAY OF PROTEIN URINE: CPT

## 2019-02-19 PROCEDURE — 80069 RENAL FUNCTION PANEL: CPT

## 2019-02-19 PROCEDURE — 84550 ASSAY OF BLOOD/URIC ACID: CPT

## 2019-02-19 PROCEDURE — 36415 COLL VENOUS BLD VENIPUNCTURE: CPT

## 2019-02-19 PROCEDURE — 81003 URINALYSIS AUTO W/O SCOPE: CPT

## 2019-02-20 LAB
PARATHYROID HORMONE INTACT: 100.8 PG/ML (ref 14–72)
VITAMIN D 25-HYDROXY: 39.7 NG/ML

## 2019-04-27 ENCOUNTER — APPOINTMENT (OUTPATIENT)
Dept: GENERAL RADIOLOGY | Age: 84
DRG: 092 | End: 2019-04-27
Payer: MEDICARE

## 2019-04-27 ENCOUNTER — APPOINTMENT (OUTPATIENT)
Dept: CT IMAGING | Age: 84
DRG: 092 | End: 2019-04-27
Payer: MEDICARE

## 2019-04-27 ENCOUNTER — HOSPITAL ENCOUNTER (INPATIENT)
Age: 84
LOS: 3 days | Discharge: NURSING, REHAB OR LONG-TERM CARE | DRG: 092 | End: 2019-04-30
Attending: EMERGENCY MEDICINE | Admitting: PEDIATRICS
Payer: MEDICARE

## 2019-04-27 DIAGNOSIS — G89.29 OTHER CHRONIC PAIN: ICD-10-CM

## 2019-04-27 DIAGNOSIS — R53.83 OTHER FATIGUE: ICD-10-CM

## 2019-04-27 DIAGNOSIS — R77.8 ELEVATED TROPONIN: ICD-10-CM

## 2019-04-27 DIAGNOSIS — N17.9 AKI (ACUTE KIDNEY INJURY) (HCC): Primary | ICD-10-CM

## 2019-04-27 PROBLEM — G93.40 ENCEPHALOPATHY: Status: ACTIVE | Noted: 2019-04-27

## 2019-04-27 LAB
A/G RATIO: 0.9 (ref 1.1–2.2)
ALBUMIN SERPL-MCNC: 3.5 G/DL (ref 3.4–5)
ALP BLD-CCNC: 86 U/L (ref 40–129)
ALT SERPL-CCNC: 11 U/L (ref 10–40)
AMMONIA: 34 UMOL/L (ref 16–60)
ANION GAP SERPL CALCULATED.3IONS-SCNC: 16 MMOL/L (ref 3–16)
AST SERPL-CCNC: 29 U/L (ref 15–37)
BASOPHILS ABSOLUTE: 0.1 K/UL (ref 0–0.2)
BASOPHILS RELATIVE PERCENT: 0.6 %
BILIRUB SERPL-MCNC: 0.4 MG/DL (ref 0–1)
BILIRUBIN URINE: NEGATIVE
BLOOD, URINE: NEGATIVE
BUN BLDV-MCNC: 73 MG/DL (ref 7–20)
CALCIUM SERPL-MCNC: 9.4 MG/DL (ref 8.3–10.6)
CHLORIDE BLD-SCNC: 83 MMOL/L (ref 99–110)
CLARITY: CLEAR
CO2: 34 MMOL/L (ref 21–32)
COLOR: YELLOW
CREAT SERPL-MCNC: 2.1 MG/DL (ref 0.8–1.3)
EKG ATRIAL RATE: 51 BPM
EKG DIAGNOSIS: NORMAL
EKG Q-T INTERVAL: 400 MS
EKG QRS DURATION: 156 MS
EKG QTC CALCULATION (BAZETT): 513 MS
EKG R AXIS: -35 DEGREES
EKG T AXIS: 120 DEGREES
EKG VENTRICULAR RATE: 99 BPM
EOSINOPHILS ABSOLUTE: 0.3 K/UL (ref 0–0.6)
EOSINOPHILS RELATIVE PERCENT: 2.2 %
GFR AFRICAN AMERICAN: 37
GFR NON-AFRICAN AMERICAN: 30
GLOBULIN: 4 G/DL
GLUCOSE BLD-MCNC: 152 MG/DL (ref 70–99)
GLUCOSE BLD-MCNC: 222 MG/DL (ref 70–99)
GLUCOSE URINE: NEGATIVE MG/DL
HCT VFR BLD CALC: 32.8 % (ref 40.5–52.5)
HEMOGLOBIN: 10.8 G/DL (ref 13.5–17.5)
KETONES, URINE: NEGATIVE MG/DL
LEUKOCYTE ESTERASE, URINE: NEGATIVE
LYMPHOCYTES ABSOLUTE: 1.7 K/UL (ref 1–5.1)
LYMPHOCYTES RELATIVE PERCENT: 13.2 %
MCH RBC QN AUTO: 31.3 PG (ref 26–34)
MCHC RBC AUTO-ENTMCNC: 33 G/DL (ref 31–36)
MCV RBC AUTO: 94.8 FL (ref 80–100)
MICROSCOPIC EXAMINATION: NORMAL
MONOCYTES ABSOLUTE: 1.2 K/UL (ref 0–1.3)
MONOCYTES RELATIVE PERCENT: 9.2 %
NEUTROPHILS ABSOLUTE: 9.8 K/UL (ref 1.7–7.7)
NEUTROPHILS RELATIVE PERCENT: 74.8 %
NITRITE, URINE: NEGATIVE
PDW BLD-RTO: 16.9 % (ref 12.4–15.4)
PERFORMED ON: ABNORMAL
PH UA: 6 (ref 5–8)
PLATELET # BLD: 228 K/UL (ref 135–450)
PMV BLD AUTO: 9.3 FL (ref 5–10.5)
POTASSIUM SERPL-SCNC: 3.6 MMOL/L (ref 3.5–5.1)
PRO-BNP: 2682 PG/ML (ref 0–449)
PROTEIN UA: NEGATIVE MG/DL
RBC # BLD: 3.46 M/UL (ref 4.2–5.9)
SODIUM BLD-SCNC: 133 MMOL/L (ref 136–145)
SPECIFIC GRAVITY UA: <=1.005 (ref 1–1.03)
TOTAL PROTEIN: 7.5 G/DL (ref 6.4–8.2)
TROPONIN: 0.04 NG/ML
URIC ACID, SERUM: 8.6 MG/DL (ref 3.5–7.2)
URINE TYPE: NORMAL
UROBILINOGEN, URINE: 0.2 E.U./DL
WBC # BLD: 13.2 K/UL (ref 4–11)

## 2019-04-27 PROCEDURE — 94640 AIRWAY INHALATION TREATMENT: CPT

## 2019-04-27 PROCEDURE — 93005 ELECTROCARDIOGRAM TRACING: CPT | Performed by: EMERGENCY MEDICINE

## 2019-04-27 PROCEDURE — 71045 X-RAY EXAM CHEST 1 VIEW: CPT

## 2019-04-27 PROCEDURE — 84484 ASSAY OF TROPONIN QUANT: CPT

## 2019-04-27 PROCEDURE — 83880 ASSAY OF NATRIURETIC PEPTIDE: CPT

## 2019-04-27 PROCEDURE — 99285 EMERGENCY DEPT VISIT HI MDM: CPT

## 2019-04-27 PROCEDURE — 2700000000 HC OXYGEN THERAPY PER DAY

## 2019-04-27 PROCEDURE — 1200000000 HC SEMI PRIVATE

## 2019-04-27 PROCEDURE — 82140 ASSAY OF AMMONIA: CPT

## 2019-04-27 PROCEDURE — 84443 ASSAY THYROID STIM HORMONE: CPT

## 2019-04-27 PROCEDURE — 6370000000 HC RX 637 (ALT 250 FOR IP): Performed by: PEDIATRICS

## 2019-04-27 PROCEDURE — 82607 VITAMIN B-12: CPT

## 2019-04-27 PROCEDURE — 85025 COMPLETE CBC W/AUTO DIFF WBC: CPT

## 2019-04-27 PROCEDURE — 94150 VITAL CAPACITY TEST: CPT

## 2019-04-27 PROCEDURE — 81003 URINALYSIS AUTO W/O SCOPE: CPT

## 2019-04-27 PROCEDURE — 94761 N-INVAS EAR/PLS OXIMETRY MLT: CPT

## 2019-04-27 PROCEDURE — 6360000002 HC RX W HCPCS: Performed by: EMERGENCY MEDICINE

## 2019-04-27 PROCEDURE — 93010 ELECTROCARDIOGRAM REPORT: CPT | Performed by: INTERNAL MEDICINE

## 2019-04-27 PROCEDURE — 96366 THER/PROPH/DIAG IV INF ADDON: CPT

## 2019-04-27 PROCEDURE — 96365 THER/PROPH/DIAG IV INF INIT: CPT

## 2019-04-27 PROCEDURE — 82746 ASSAY OF FOLIC ACID SERUM: CPT

## 2019-04-27 PROCEDURE — 83036 HEMOGLOBIN GLYCOSYLATED A1C: CPT

## 2019-04-27 PROCEDURE — 70450 CT HEAD/BRAIN W/O DYE: CPT

## 2019-04-27 PROCEDURE — 84550 ASSAY OF BLOOD/URIC ACID: CPT

## 2019-04-27 PROCEDURE — 80053 COMPREHEN METABOLIC PANEL: CPT

## 2019-04-27 PROCEDURE — 36415 COLL VENOUS BLD VENIPUNCTURE: CPT

## 2019-04-27 RX ORDER — M-VIT,TX,IRON,MINS/CALC/FOLIC 27MG-0.4MG
1 TABLET ORAL DAILY
Status: DISCONTINUED | OUTPATIENT
Start: 2019-04-27 | End: 2019-04-30 | Stop reason: HOSPADM

## 2019-04-27 RX ORDER — ONDANSETRON 2 MG/ML
4 INJECTION INTRAMUSCULAR; INTRAVENOUS EVERY 6 HOURS PRN
Status: DISCONTINUED | OUTPATIENT
Start: 2019-04-27 | End: 2019-04-30 | Stop reason: HOSPADM

## 2019-04-27 RX ORDER — ACETAMINOPHEN 325 MG/1
650 TABLET ORAL 3 TIMES DAILY
Status: DISCONTINUED | OUTPATIENT
Start: 2019-04-27 | End: 2019-04-30 | Stop reason: HOSPADM

## 2019-04-27 RX ORDER — SODIUM CHLORIDE 0.9 % (FLUSH) 0.9 %
10 SYRINGE (ML) INJECTION EVERY 12 HOURS SCHEDULED
Status: DISCONTINUED | OUTPATIENT
Start: 2019-04-27 | End: 2019-04-30 | Stop reason: HOSPADM

## 2019-04-27 RX ORDER — SPIRONOLACTONE 25 MG/1
25 TABLET ORAL DAILY
Status: DISCONTINUED | OUTPATIENT
Start: 2019-04-27 | End: 2019-04-30 | Stop reason: HOSPADM

## 2019-04-27 RX ORDER — AMMONIUM LACTATE 12 G/100G
LOTION TOPICAL 2 TIMES DAILY PRN
Status: DISCONTINUED | OUTPATIENT
Start: 2019-04-27 | End: 2019-04-30 | Stop reason: HOSPADM

## 2019-04-27 RX ORDER — CARVEDILOL 3.12 MG/1
3.12 TABLET ORAL 2 TIMES DAILY
Status: DISCONTINUED | OUTPATIENT
Start: 2019-04-27 | End: 2019-04-30 | Stop reason: HOSPADM

## 2019-04-27 RX ORDER — LEVOFLOXACIN 5 MG/ML
750 INJECTION, SOLUTION INTRAVENOUS ONCE
Status: COMPLETED | OUTPATIENT
Start: 2019-04-27 | End: 2019-04-27

## 2019-04-27 RX ORDER — RANOLAZINE 500 MG/1
500 TABLET, EXTENDED RELEASE ORAL 2 TIMES DAILY
Status: DISCONTINUED | OUTPATIENT
Start: 2019-04-27 | End: 2019-04-30 | Stop reason: HOSPADM

## 2019-04-27 RX ORDER — ALBUTEROL SULFATE 90 UG/1
2 AEROSOL, METERED RESPIRATORY (INHALATION) EVERY 6 HOURS PRN
Status: DISCONTINUED | OUTPATIENT
Start: 2019-04-27 | End: 2019-04-30 | Stop reason: HOSPADM

## 2019-04-27 RX ORDER — NICOTINE POLACRILEX 4 MG
15 LOZENGE BUCCAL PRN
Status: DISCONTINUED | OUTPATIENT
Start: 2019-04-27 | End: 2019-04-30 | Stop reason: HOSPADM

## 2019-04-27 RX ORDER — GABAPENTIN 300 MG/1
300 CAPSULE ORAL 2 TIMES DAILY PRN
Status: DISCONTINUED | OUTPATIENT
Start: 2019-04-27 | End: 2019-04-30 | Stop reason: HOSPADM

## 2019-04-27 RX ORDER — FERROUS SULFATE 325(65) MG
325 TABLET ORAL 2 TIMES DAILY WITH MEALS
Status: DISCONTINUED | OUTPATIENT
Start: 2019-04-27 | End: 2019-04-30 | Stop reason: HOSPADM

## 2019-04-27 RX ORDER — DEXTROSE MONOHYDRATE 50 MG/ML
100 INJECTION, SOLUTION INTRAVENOUS PRN
Status: DISCONTINUED | OUTPATIENT
Start: 2019-04-27 | End: 2019-04-30 | Stop reason: HOSPADM

## 2019-04-27 RX ORDER — SODIUM CHLORIDE 0.9 % (FLUSH) 0.9 %
10 SYRINGE (ML) INJECTION PRN
Status: DISCONTINUED | OUTPATIENT
Start: 2019-04-27 | End: 2019-04-30 | Stop reason: HOSPADM

## 2019-04-27 RX ORDER — LISINOPRIL 2.5 MG/1
2.5 TABLET ORAL DAILY
Status: DISCONTINUED | OUTPATIENT
Start: 2019-04-27 | End: 2019-04-30 | Stop reason: HOSPADM

## 2019-04-27 RX ORDER — FLUTICASONE PROPIONATE 50 MCG
1 SPRAY, SUSPENSION (ML) NASAL DAILY
Status: DISCONTINUED | OUTPATIENT
Start: 2019-04-27 | End: 2019-04-30 | Stop reason: HOSPADM

## 2019-04-27 RX ORDER — POLYETHYLENE GLYCOL 3350 17 G/17G
17 POWDER, FOR SOLUTION ORAL DAILY PRN
Status: DISCONTINUED | OUTPATIENT
Start: 2019-04-27 | End: 2019-04-30 | Stop reason: HOSPADM

## 2019-04-27 RX ORDER — PANTOPRAZOLE SODIUM 40 MG/1
40 TABLET, DELAYED RELEASE ORAL
Status: DISCONTINUED | OUTPATIENT
Start: 2019-04-28 | End: 2019-04-30 | Stop reason: HOSPADM

## 2019-04-27 RX ORDER — LIDOCAINE 4 G/G
1 PATCH TOPICAL DAILY
Status: DISCONTINUED | OUTPATIENT
Start: 2019-04-28 | End: 2019-04-30 | Stop reason: HOSPADM

## 2019-04-27 RX ORDER — ASPIRIN 81 MG/1
81 TABLET ORAL DAILY
Status: DISCONTINUED | OUTPATIENT
Start: 2019-04-27 | End: 2019-04-30 | Stop reason: HOSPADM

## 2019-04-27 RX ORDER — FUROSEMIDE 40 MG/1
40 TABLET ORAL DAILY
Status: DISCONTINUED | OUTPATIENT
Start: 2019-04-27 | End: 2019-04-30 | Stop reason: HOSPADM

## 2019-04-27 RX ORDER — ATORVASTATIN CALCIUM 10 MG/1
20 TABLET, FILM COATED ORAL DAILY
Status: DISCONTINUED | OUTPATIENT
Start: 2019-04-27 | End: 2019-04-30 | Stop reason: HOSPADM

## 2019-04-27 RX ORDER — DEXTROSE MONOHYDRATE 25 G/50ML
12.5 INJECTION, SOLUTION INTRAVENOUS PRN
Status: DISCONTINUED | OUTPATIENT
Start: 2019-04-27 | End: 2019-04-30 | Stop reason: HOSPADM

## 2019-04-27 RX ORDER — INSULIN GLARGINE 100 [IU]/ML
25 INJECTION, SOLUTION SUBCUTANEOUS NIGHTLY
Status: DISCONTINUED | OUTPATIENT
Start: 2019-04-27 | End: 2019-04-30 | Stop reason: HOSPADM

## 2019-04-27 RX ADMIN — RANOLAZINE 500 MG: 500 TABLET, FILM COATED, EXTENDED RELEASE ORAL at 23:35

## 2019-04-27 RX ADMIN — ACETAMINOPHEN 650 MG: 325 TABLET, FILM COATED ORAL at 23:35

## 2019-04-27 RX ADMIN — LEVOFLOXACIN 750 MG: 5 INJECTION, SOLUTION INTRAVENOUS at 15:55

## 2019-04-27 RX ADMIN — CARVEDILOL 3.12 MG: 3.12 TABLET, FILM COATED ORAL at 23:35

## 2019-04-27 RX ADMIN — FERROUS SULFATE TAB 325 MG (65 MG ELEMENTAL FE) 325 MG: 325 (65 FE) TAB at 23:35

## 2019-04-27 RX ADMIN — Medication 2 PUFF: at 20:49

## 2019-04-27 RX ADMIN — GABAPENTIN 300 MG: 300 CAPSULE ORAL at 23:35

## 2019-04-27 RX ADMIN — APIXABAN 2.5 MG: 5 TABLET, FILM COATED ORAL at 23:35

## 2019-04-27 RX ADMIN — INSULIN LISPRO 1 UNITS: 100 INJECTION, SOLUTION INTRAVENOUS; SUBCUTANEOUS at 23:34

## 2019-04-27 RX ADMIN — ATORVASTATIN CALCIUM 20 MG: 10 TABLET, FILM COATED ORAL at 23:35

## 2019-04-27 ASSESSMENT — PAIN SCALES - GENERAL: PAINLEVEL_OUTOF10: 5

## 2019-04-27 ASSESSMENT — PAIN DESCRIPTION - LOCATION: LOCATION: HEAD;NECK

## 2019-04-27 ASSESSMENT — PAIN DESCRIPTION - PROGRESSION: CLINICAL_PROGRESSION: NOT CHANGED

## 2019-04-27 ASSESSMENT — PAIN DESCRIPTION - ONSET: ONSET: ON-GOING

## 2019-04-27 ASSESSMENT — PAIN DESCRIPTION - PAIN TYPE: TYPE: ACUTE PAIN

## 2019-04-27 ASSESSMENT — PAIN - FUNCTIONAL ASSESSMENT: PAIN_FUNCTIONAL_ASSESSMENT: ACTIVITIES ARE NOT PREVENTED

## 2019-04-27 ASSESSMENT — PAIN DESCRIPTION - DESCRIPTORS: DESCRIPTORS: HEADACHE

## 2019-04-27 ASSESSMENT — PAIN DESCRIPTION - FREQUENCY: FREQUENCY: INTERMITTENT

## 2019-04-27 NOTE — ED NOTES
Unable to complete fall risk screening. However, pt is only oriented to self. NOT oriented to time or place. Also hallucinating - seeing a \"child trying to put on a sweater\". Bed alarm placed under pt. Fall risk armband on. Yellow non-skid socks placed on patient.       Radha Bates RN  04/27/19 3403

## 2019-04-27 NOTE — LETTER
Beneficiary Notification Letter     This Bill Craig Provider is Participating in an Innovative Payment and [address] [place] [address] [place] from Medicare     Greetings:   [address]on is participating in a Medicare initiative called the [facility] for [facility]. You are receiving this letter because your health care provider has identified you as a patient who is receiving care through this initiative. Health care providers participating in the [facility], including Ofelia Limon, will work with Medicare to improve care for patients. Your Medicare rights have not been changed. You still have all the same Medicare rights and protections, including the right to choose which hospital, doctor, or other health care provider you see. However, because Ofelia Limon chose to participate in the [address] [place] [address], all Medicare beneficiaries who meet the eligibility criteria of this initiative will receive care under the initiative. If you do not wish to receive care under the [employer] [facility], you must choose a health care provider that does not participate in this initiative for your care. Regardless of which health care provider you see, Medicare will continue to cover all of your medically necessary services. Bundled Payments for Care Improvement Advanced aims to help improve your care     The [employer]led [employer] [facility] is an innovative Medicare initiative that encourages your doctors, hospitals, and other health care providers to work more closely together so you get better care during and following certain hospital stays.  In this initiative, doctors and hospitals may work closely with certain health care providers and suppliers that help patients recover after discharge from the hospital, · To find a different doctor, visit Medicare's Physician Compare website, HDTapes.co.nz, or call 1-800-MEDICARE (333 8515). TTY users should call 6-801.111.8323. · To find a different skilled nursing facility, visit [facility] Med[facility] website, https://[email or web address].[email or web address]/, or call 1-800-MEDICARE (1- 828.196.8100). TTY users should call 4-742.224.3799. · To find a different long term care hospital, visit [facility] 9[facility] Compare website, Smellology.be, or call 1-800- MEDICARE (109 2723). TTY users should call 6-324.306.1610. · To find a different inpatient rehabilitation facility, visit [facility] Compare website, www.medicare.gov/ inpatientrehabilitation facilitycompare, or call 1-800-MEDICARE (4-390.531.1338). TTY users should call 4- 121.232.7236. · To find a different home health agency, visit [address] website, www.medicare.gov/homehealthcompare, or call 1-800-MEDICARE (6-147- 210-7894). TTY users should call 6-355.641.3764.

## 2019-04-27 NOTE — H&P
Capillary Refill: Brisk,< 3 seconds   Peripheral Pulses: +2 palpable, equal bilaterally       Labs:     Recent Labs     04/27/19  1424   WBC 13.2*   HGB 10.8*   HCT 32.8*        Recent Labs     04/27/19  1424   *   K 3.6   CL 83*   CO2 34*   BUN 73*   CREATININE 2.1*   CALCIUM 9.4     Recent Labs     04/27/19  1424   AST 29   ALT 11   BILITOT 0.4   ALKPHOS 86     No results for input(s): INR in the last 72 hours. Recent Labs     04/27/19  1424   TROPONINI 0.04*       Urinalysis:      Lab Results   Component Value Date    NITRU Negative 04/27/2019    WBCUA 0-2 08/14/2018    BACTERIA Rare 08/14/2018    RBCUA 3-5 08/14/2018    BLOODU Negative 04/27/2019    SPECGRAV <=1.005 04/27/2019    GLUCOSEU Negative 04/27/2019       Radiology:        EKG:  I have reviewed the EKG with the following interpretation: v-paced, irregular     CT Head WO Contrast   Final Result   No acute intracranial abnormality. XR CHEST PORTABLE   Final Result   Findings most compatible with mild congestive heart failure and bibasilar   edema. Possibility of right lower lobe pneumonia or chronic pleural and   parenchymal scarring right lower lobe should be included the differential   since no interval chest film was obtained to document resolution.              ASSESSMENT:    Active Hospital Problems    Diagnosis Date Noted    GRACE (acute kidney injury) ([facility] [address] [N17.9] 04/27/2019         PLAN:    Encephalopathy, with fatigue, intermittent confusion:   - this MD with concern for possible dementia however cannot determine this from a single exam alone - will workup   - TSH   - B12/folate   - check ammonia (as workup)  - drug screen    - suspect multifactorial - related to medications  - follow blood  glucose   - follow blood pressures (not hypotensive per ED recordings - although ED staff assessed hypotension for SBP of 92 and provided 1 L NS bolus, EMS documentation also did not endorse hypotension), orthostatic vitals in AM   - (formulary)     Neuropathy:   - gabapentin 300 mg po qhs     Rhinitis:   - fluticasone daily     Constipation:   - miralax 17 daily prn     Skin:   - ammonium lactate 12% cream BID PRN     Supplement:   - vitamin D - HELD     Sleep aid:   - ambien - HELD     Bed sore - stage 1 pressure sore on sacrum / buttocks:   - skin care ordered / RNs to address   - old appearing stool in diaper on presentation       DVT Prophylaxis: on apixaban   Diet: No diet orders on file  Code Status: Prior - DNR-CCA (per his wife, discussion with 2 sons present, and daughter by phone)     Alternative medical decision maker: when asked who could make medical decision on his behalf patient replied \"I dont' know who I could get\"   - HC POA already set up - is his daughter - Myriam Herrmann, 2nd on list is son Jenni Willis     PT/OT Eval Status: consulted      Maragret Olivarez - pending        Pablo Rivera MD    Thank you Jeramie 0321 for the opportunity to be involved in this patient's care.

## 2019-04-27 NOTE — ED PROVIDER NOTES
[facility] - MED SURG  eMERGENCYdEPARTMENT eNCOUnter      Pt Name: Luis Alejo  MRN: [id number]  [place] 1935  Date of evaluation: 4/27/2019  Martha Kirkland MD    CHIEF COMPLAINT       Chief Complaint   Patient presents with    Altered Mental Status     AMS per wife at home and fatigue. A-fib on monitor per ems. . no hx.  Fatigue         HISTORY OF PRESENT ILLNESS   (Location/Symptom, Timing/Onset,Context/Setting, Quality, Duration, Modifying Factors, Severity)  Note limiting factors. Luis Alejo is a 81 y.o. male history of CAD, hypertension, diabetes, CKD, paroxysmal A. fib who presents to the emergency department for AMS. Per wife, patient is more fatigued, needing help to ambulate, not participating in his usual activities. Reports emesis x 2 yesterday, none today. Wife couldn't get him out of the bed, couldn't get him to walk despite needing to use the bathroom. -patient states 'I can't remember things that happen to me'   -Denies cp, sob, cough, fever, diarrhea. HPI    Nursing Notes were reviewed. REVIEW OF SYSTEMS    (2-9 systems for level 4, 10 or more for level 5)     Review of Systems   Constitutional: Positive for appetite change. Negative for activity change, chills, diaphoresis and fever. HENT: Negative for congestion, rhinorrhea, sneezing, sore throat and trouble swallowing. Respiratory: Negative for cough, chest tightness and shortness of breath. Cardiovascular: Negative for chest pain and palpitations. Gastrointestinal: Negative for abdominal pain, constipation, diarrhea, nausea and vomiting. Genitourinary: Negative for dysuria, flank pain and hematuria. Musculoskeletal: Positive for gait problem. Negative for back pain and myalgias. Skin: Negative for rash and wound. Neurological: Negative for dizziness, weakness and numbness. Psychiatric/Behavioral: Positive for confusion.        Except as noted above the remainder of the review of systems was Lifestyle    Physical activity:     Days per week: None     Minutes per session: None    Stress: None   Relationships    Social connections:     Talks on phone: None     Gets together: None     Attends Sikh service: None     Active member of club or organization: None     Attends meetings of clubs or organizations: None     Relationship status: None    Intimate partner violence:     Fear of current or ex partner: None     Emotionally abused: None     Physically abused: None     Forced sexual activity: None   Other Topics Concern    None   Social History Narrative    None       SCREENINGS    [place] Coma Scale  Eye Opening: Spontaneous  Best Verbal Response: Confused  Best Motor Response: Obeys commands  Remigio Coma Scale Score: 14        PHYSICAL EXAM    (up to 7 for level 4, 8 ormore for level 5)     ED Triage Vitals [04/27/19 1413]   BP Temp Temp Source Pulse Resp SpO2 Height Weight   92/79 98.2 °F (36.8 °C) Oral 101 18 98 % -- --       Physical Exam   Constitutional: He is oriented to person, place, and time. He appears well-developed and well-nourished. He is cooperative. Non-toxic appearance. He does not have a sickly appearance. He does not appear ill. No distress. Sitting in bed comfortably, speaking in full sentences, following verbal commands appropriately. Not in acute distress     HENT:   Head: Normocephalic and atraumatic. Mouth/Throat: Oropharynx is clear and moist.   Eyes: Pupils are equal, round, and reactive to light. Conjunctivae and EOM are normal.   Neck: Normal range of motion. Neck supple. Cardiovascular: Normal rate, regular rhythm, normal heart sounds and intact distal pulses. Exam reveals no gallop and no friction rub. No murmur heard. Pulmonary/Chest: Effort normal and breath sounds normal. No respiratory distress. He has no decreased breath sounds. He has no wheezes. He has no rhonchi. He has no rales. Abdominal: Soft.  Normal appearance and bowel sounds are normal. [id number]   Phone [phone]   COMPREHENSIVE METABOLIC PANEL - Abnormal; Notable for the following components:    Sodium 133 (*)     Chloride 83 (*)     CO2 34 (*)     Glucose 152 (*)     BUN 73 (*)     CREATININE 2.1 (*)     GFR Non- 30 (*)     GFR African American 37 (*)     Albumin/Globulin Ratio 0.9 (*)     All other components within normal limits    Narrative:     Performed at:  [place] [address] [place]   Phone [phone]   TROPONIN - Abnormal; Notable for the following components:    Troponin 0.04 (*)     All other components within normal limits    Narrative:     Performed at:  [place] [address] [place]   Phone [phone] - Abnormal; Notable for the following components:    Pro-BNP 2,682 (*)     All other components within normal limits    Narrative:     Performed at:  [place] [address] [place]   Phone [phone]   BASIC METABOLIC PANEL W/ REFLEX TO MG FOR LOW K - Abnormal; Notable for the following components:    Potassium reflex Magnesium 2.6 (*)     Chloride 87 (*)     CO2 37 (*)     Anion Gap 17 (*)     Glucose 147 (*)     BUN 73 (*)     CREATININE 2.1 (*)     GFR Non- 30 (*)     GFR  37 (*)     All other components within normal limits    Narrative:     CALL  Link  [facility] tel. [phone],  Chemistry results called to and read back by Ramiro wiley rn, 04/28/2019  07:40, by Aries Shetty has been rescheduled by [facility] at 4/28/2019 06:05.  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 [phone],  Chemistry results called to and read back by Kwame wiley rn, 04/28/2019  07:40, by Demi Zayas has been rescheduled by [facility] at 4/28/2019 06:05. 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 EMERGENCY DEPARTMENT COURSE and DIFFERENTIAL DIAGNOSIS/MDM:   Vitals:    Vitals:    04/28/19 2047 04/29/19 0015 04/29/19 0309 04/29/19 0630   BP: 93/65 104/64 116/71    Pulse: 92 84 77    Resp: 16 16 14    Temp: 97.7 °F (36.5 °C) 97.8 °F (36.6 °C) 97.5 °F (36.4 °C)    TempSrc: Oral Oral Oral    SpO2: 98% 100% 100%    Weight:    222 lb 0.1 oz (100.7 kg)   Height:             MDM    ED COURSE/MDM    -Nando Byrd is a 81 y.o. male presents to ED for fatigue, unable to get out of bed, and confusion.  -Patient unable to get out of bed even to go to the bathroom  -Patient reports he is unable to 'remember things'  -A&Ox3  -Arrival patient tachycardic at heart rate of 101 and hypotensive at 92/79 (last blood pressure was 117/65)  -Patient seen and evaluated. Oldrecords reviewed. Labs and imaging reviewed and results discussed with patient.  Workup was significant for acute on chronic a KI with creatinine of 2.1 baseline is 1.8, elevated troponin 0.04 status is 13.2  -Patient was given IVF bolus with

## 2019-04-27 NOTE — ED NOTES
Patient adjusted in bed for comfort. Warm blanket applied. Call light remains in reach, family at bedside. Will continue to monitor.       Bart Moseley RN  04/27/19 9024

## 2019-04-27 NOTE — ED NOTES
Bed: 12  Expected date:   Expected time:   Means of arrival:   Comments:  kieran Martin, RN  04/27/19 4095

## 2019-04-28 LAB
AMPHETAMINE SCREEN, URINE: ABNORMAL
ANION GAP SERPL CALCULATED.3IONS-SCNC: 14 MMOL/L (ref 3–16)
ANION GAP SERPL CALCULATED.3IONS-SCNC: 17 MMOL/L (ref 3–16)
BARBITURATE SCREEN URINE: ABNORMAL
BENZODIAZEPINE SCREEN, URINE: ABNORMAL
BUN BLDV-MCNC: 70 MG/DL (ref 7–20)
BUN BLDV-MCNC: 73 MG/DL (ref 7–20)
CALCIUM SERPL-MCNC: 9.3 MG/DL (ref 8.3–10.6)
CALCIUM SERPL-MCNC: 9.4 MG/DL (ref 8.3–10.6)
CANNABINOID SCREEN URINE: ABNORMAL
CHLORIDE BLD-SCNC: 85 MMOL/L (ref 99–110)
CHLORIDE BLD-SCNC: 87 MMOL/L (ref 99–110)
CO2: 36 MMOL/L (ref 21–32)
CO2: 37 MMOL/L (ref 21–32)
COCAINE METABOLITE SCREEN URINE: ABNORMAL
CREAT SERPL-MCNC: 2.1 MG/DL (ref 0.8–1.3)
CREAT SERPL-MCNC: 2.1 MG/DL (ref 0.8–1.3)
EKG ATRIAL RATE: 78 BPM
EKG DIAGNOSIS: NORMAL
EKG Q-T INTERVAL: 198 MS
EKG QRS DURATION: 152 MS
EKG QTC CALCULATION (BAZETT): 225 MS
EKG R AXIS: 8 DEGREES
EKG T AXIS: 0 DEGREES
EKG VENTRICULAR RATE: 78 BPM
ESTIMATED AVERAGE GLUCOSE: 142.7 MG/DL
FOLATE: >20 NG/ML (ref 4.78–24.2)
GFR AFRICAN AMERICAN: 37
GFR AFRICAN AMERICAN: 37
GFR NON-AFRICAN AMERICAN: 30
GFR NON-AFRICAN AMERICAN: 30
GLUCOSE BLD-MCNC: 147 MG/DL (ref 70–99)
GLUCOSE BLD-MCNC: 182 MG/DL (ref 70–99)
GLUCOSE BLD-MCNC: 183 MG/DL (ref 70–99)
GLUCOSE BLD-MCNC: 208 MG/DL (ref 70–99)
GLUCOSE BLD-MCNC: 211 MG/DL (ref 70–99)
HBA1C MFR BLD: 6.6 %
HCT VFR BLD CALC: 30.9 % (ref 40.5–52.5)
HCT VFR BLD CALC: 33.8 % (ref 40.5–52.5)
HEMOGLOBIN: 10.3 G/DL (ref 13.5–17.5)
HEMOGLOBIN: 10.8 G/DL (ref 13.5–17.5)
Lab: ABNORMAL
MAGNESIUM: 1.8 MG/DL (ref 1.8–2.4)
MCH RBC QN AUTO: 31.4 PG (ref 26–34)
MCH RBC QN AUTO: 31.6 PG (ref 26–34)
MCHC RBC AUTO-ENTMCNC: 31.8 G/DL (ref 31–36)
MCHC RBC AUTO-ENTMCNC: 33.2 G/DL (ref 31–36)
MCV RBC AUTO: 94.6 FL (ref 80–100)
MCV RBC AUTO: 99.4 FL (ref 80–100)
METHADONE SCREEN, URINE: ABNORMAL
OPIATE SCREEN URINE: ABNORMAL
OXYCODONE URINE: POSITIVE
PDW BLD-RTO: 16.8 % (ref 12.4–15.4)
PDW BLD-RTO: 17.3 % (ref 12.4–15.4)
PERFORMED ON: ABNORMAL
PH UA: 6
PHENCYCLIDINE SCREEN URINE: ABNORMAL
PLATELET # BLD: 162 K/UL (ref 135–450)
PLATELET # BLD: 204 K/UL (ref 135–450)
PMV BLD AUTO: 8.6 FL (ref 5–10.5)
PMV BLD AUTO: 8.8 FL (ref 5–10.5)
POTASSIUM REFLEX MAGNESIUM: 2.6 MMOL/L (ref 3.5–5.1)
POTASSIUM SERPL-SCNC: 2.8 MMOL/L (ref 3.5–5.1)
PROPOXYPHENE SCREEN: ABNORMAL
RBC # BLD: 3.27 M/UL (ref 4.2–5.9)
RBC # BLD: 3.4 M/UL (ref 4.2–5.9)
SODIUM BLD-SCNC: 135 MMOL/L (ref 136–145)
SODIUM BLD-SCNC: 141 MMOL/L (ref 136–145)
TROPONIN: 0.05 NG/ML
TROPONIN: 0.05 NG/ML
TSH REFLEX: 3.16 UIU/ML (ref 0.27–4.2)
VITAMIN B-12: 830 PG/ML (ref 211–911)
WBC # BLD: 11.4 K/UL (ref 4–11)
WBC # BLD: 9.9 K/UL (ref 4–11)

## 2019-04-28 PROCEDURE — 6370000000 HC RX 637 (ALT 250 FOR IP): Performed by: INTERNAL MEDICINE

## 2019-04-28 PROCEDURE — 1200000000 HC SEMI PRIVATE

## 2019-04-28 PROCEDURE — 2580000003 HC RX 258: Performed by: PEDIATRICS

## 2019-04-28 PROCEDURE — 93005 ELECTROCARDIOGRAM TRACING: CPT | Performed by: NURSE PRACTITIONER

## 2019-04-28 PROCEDURE — 6370000000 HC RX 637 (ALT 250 FOR IP): Performed by: PEDIATRICS

## 2019-04-28 PROCEDURE — 80307 DRUG TEST PRSMV CHEM ANLYZR: CPT

## 2019-04-28 PROCEDURE — 80048 BASIC METABOLIC PNL TOTAL CA: CPT

## 2019-04-28 PROCEDURE — 83735 ASSAY OF MAGNESIUM: CPT

## 2019-04-28 PROCEDURE — 94761 N-INVAS EAR/PLS OXIMETRY MLT: CPT

## 2019-04-28 PROCEDURE — 97530 THERAPEUTIC ACTIVITIES: CPT

## 2019-04-28 PROCEDURE — 2700000000 HC OXYGEN THERAPY PER DAY

## 2019-04-28 PROCEDURE — 94640 AIRWAY INHALATION TREATMENT: CPT

## 2019-04-28 PROCEDURE — 93010 ELECTROCARDIOGRAM REPORT: CPT | Performed by: INTERNAL MEDICINE

## 2019-04-28 PROCEDURE — 36415 COLL VENOUS BLD VENIPUNCTURE: CPT

## 2019-04-28 PROCEDURE — 84484 ASSAY OF TROPONIN QUANT: CPT

## 2019-04-28 PROCEDURE — 97116 GAIT TRAINING THERAPY: CPT

## 2019-04-28 PROCEDURE — 2580000003 HC RX 258

## 2019-04-28 PROCEDURE — 97162 PT EVAL MOD COMPLEX 30 MIN: CPT

## 2019-04-28 PROCEDURE — 97535 SELF CARE MNGMENT TRAINING: CPT

## 2019-04-28 PROCEDURE — 85027 COMPLETE CBC AUTOMATED: CPT

## 2019-04-28 PROCEDURE — 97167 OT EVAL HIGH COMPLEX 60 MIN: CPT

## 2019-04-28 PROCEDURE — 6360000002 HC RX W HCPCS: Performed by: NURSE PRACTITIONER

## 2019-04-28 RX ORDER — POTASSIUM CHLORIDE 20 MEQ/1
20 TABLET, EXTENDED RELEASE ORAL ONCE
Status: COMPLETED | OUTPATIENT
Start: 2019-04-28 | End: 2019-04-28

## 2019-04-28 RX ORDER — POTASSIUM CHLORIDE 7.45 MG/ML
10 INJECTION INTRAVENOUS PRN
Status: DISCONTINUED | OUTPATIENT
Start: 2019-04-28 | End: 2019-04-30 | Stop reason: HOSPADM

## 2019-04-28 RX ORDER — SODIUM CHLORIDE 9 MG/ML
INJECTION, SOLUTION INTRAVENOUS
Status: COMPLETED
Start: 2019-04-28 | End: 2019-04-28

## 2019-04-28 RX ORDER — OXYCODONE HYDROCHLORIDE AND ACETAMINOPHEN 5; 325 MG/1; MG/1
1 TABLET ORAL DAILY
Status: ON HOLD | COMMUNITY
End: 2019-04-30 | Stop reason: SDUPTHER

## 2019-04-28 RX ADMIN — Medication 10 ML: at 22:10

## 2019-04-28 RX ADMIN — INSULIN LISPRO 2 UNITS: 100 INJECTION, SOLUTION INTRAVENOUS; SUBCUTANEOUS at 17:34

## 2019-04-28 RX ADMIN — INSULIN LISPRO 1 UNITS: 100 INJECTION, SOLUTION INTRAVENOUS; SUBCUTANEOUS at 22:39

## 2019-04-28 RX ADMIN — INSULIN GLARGINE 25 UNITS: 100 INJECTION, SOLUTION SUBCUTANEOUS at 01:24

## 2019-04-28 RX ADMIN — POTASSIUM CHLORIDE 20 MEQ: 20 TABLET, EXTENDED RELEASE ORAL at 09:43

## 2019-04-28 RX ADMIN — APIXABAN 2.5 MG: 5 TABLET, FILM COATED ORAL at 22:38

## 2019-04-28 RX ADMIN — CARVEDILOL 3.12 MG: 3.12 TABLET, FILM COATED ORAL at 09:37

## 2019-04-28 RX ADMIN — INSULIN LISPRO 1 UNITS: 100 INJECTION, SOLUTION INTRAVENOUS; SUBCUTANEOUS at 09:45

## 2019-04-28 RX ADMIN — ACETAMINOPHEN 650 MG: 325 TABLET, FILM COATED ORAL at 09:36

## 2019-04-28 RX ADMIN — ACETAMINOPHEN 650 MG: 325 TABLET, FILM COATED ORAL at 15:38

## 2019-04-28 RX ADMIN — FLUTICASONE PROPIONATE 1 SPRAY: 50 SPRAY, METERED NASAL at 09:44

## 2019-04-28 RX ADMIN — FERROUS SULFATE TAB 325 MG (65 MG ELEMENTAL FE) 325 MG: 325 (65 FE) TAB at 09:36

## 2019-04-28 RX ADMIN — APIXABAN 2.5 MG: 5 TABLET, FILM COATED ORAL at 09:37

## 2019-04-28 RX ADMIN — GABAPENTIN 300 MG: 300 CAPSULE ORAL at 22:38

## 2019-04-28 RX ADMIN — INSULIN GLARGINE 25 UNITS: 100 INJECTION, SOLUTION SUBCUTANEOUS at 22:37

## 2019-04-28 RX ADMIN — PANTOPRAZOLE SODIUM 40 MG: 40 TABLET, DELAYED RELEASE ORAL at 04:49

## 2019-04-28 RX ADMIN — RANOLAZINE 500 MG: 500 TABLET, FILM COATED, EXTENDED RELEASE ORAL at 22:38

## 2019-04-28 RX ADMIN — Medication 2 PUFF: at 20:46

## 2019-04-28 RX ADMIN — VITAMIN D, TAB 1000IU (100/BT) 1000 UNITS: 25 TAB at 09:37

## 2019-04-28 RX ADMIN — Medication 10 ML: at 09:00

## 2019-04-28 RX ADMIN — Medication 1 TABLET: at 09:38

## 2019-04-28 RX ADMIN — ASPIRIN 81 MG: 81 TABLET, COATED ORAL at 09:38

## 2019-04-28 RX ADMIN — INSULIN LISPRO 1 UNITS: 100 INJECTION, SOLUTION INTRAVENOUS; SUBCUTANEOUS at 13:06

## 2019-04-28 RX ADMIN — POTASSIUM CHLORIDE 10 MEQ: 10 INJECTION, SOLUTION INTRAVENOUS at 22:37

## 2019-04-28 RX ADMIN — FUROSEMIDE 40 MG: 40 TABLET ORAL at 09:38

## 2019-04-28 RX ADMIN — ACETAMINOPHEN 650 MG: 325 TABLET, FILM COATED ORAL at 22:38

## 2019-04-28 RX ADMIN — SPIRONOLACTONE 25 MG: 25 TABLET ORAL at 09:43

## 2019-04-28 RX ADMIN — CARVEDILOL 3.12 MG: 3.12 TABLET, FILM COATED ORAL at 22:38

## 2019-04-28 RX ADMIN — ATORVASTATIN CALCIUM 20 MG: 10 TABLET, FILM COATED ORAL at 09:36

## 2019-04-28 RX ADMIN — LISINOPRIL 2.5 MG: 2.5 TABLET ORAL at 09:38

## 2019-04-28 RX ADMIN — Medication 2 PUFF: at 07:53

## 2019-04-28 RX ADMIN — RANOLAZINE 500 MG: 500 TABLET, FILM COATED, EXTENDED RELEASE ORAL at 09:43

## 2019-04-28 RX ADMIN — SODIUM CHLORIDE 1000 ML: 9 INJECTION, SOLUTION INTRAVENOUS at 22:33

## 2019-04-28 RX ADMIN — FERROUS SULFATE TAB 325 MG (65 MG ELEMENTAL FE) 325 MG: 325 (65 FE) TAB at 19:45

## 2019-04-28 ASSESSMENT — PAIN SCALES - GENERAL
PAINLEVEL_OUTOF10: 5
PAINLEVEL_OUTOF10: 6
PAINLEVEL_OUTOF10: 6
PAINLEVEL_OUTOF10: 5

## 2019-04-28 ASSESSMENT — PAIN DESCRIPTION - PROGRESSION
CLINICAL_PROGRESSION: NOT CHANGED

## 2019-04-28 ASSESSMENT — PAIN DESCRIPTION - LOCATION: LOCATION: HEAD

## 2019-04-28 NOTE — PLAN OF CARE
4 Eyes Skin Assessment     The patient is being assess for  Admission    I agree that 2 RN's have performed a thorough Head to Toe Skin Assessment on the patient. ALL assessment sites listed below have been assessed. Areas assessed by both nurses: Chava Pham, HARPREET and Brandon Bello RN  [x]   Head, Face, and Ears   [x]   Shoulders, Back, and Chest  [x]   Arms, Elbows, and Hands   [x]   Coccyx, Sacrum, and Ischum  [x]   Legs, Feet, and Heels        Does the Patient have Skin Breakdown?   Yes a wound was noted on the Admission Assessment and an WOUND LDA was Initiated documentation include the Sarahy-wound, Wound Assessment, Measurements, Dressing Treatment, Drainage, and Color\",         [place] Prevention initiated:  Yes   Wound Care Orders initiated:  Yes      [address]  nurse consulted for Pressure Injury (Stage 3,4, Unstageable, DTI, NWPT, and Complex wounds):  No      Nurse 1 eSignature: Electronically signed by Radha Mills RN on 4/28/19 at 5:48 AM    **SHARE this note so that the co-signing nurse is able to place an eSignature**    Nurse 2 eSignature: Electronically signed by Rory Salmeron RN on 4/28/19 at 10:59 PM

## 2019-04-28 NOTE — PROGRESS NOTES
Pacemaker possibly misfiring. [address] confirmed - will fax tele strip. Paged Jackelyn Hernandez CNP - need order for EKG. Ordered STAT. Notified RT. RT aware and will be up to perform EKG shortly. Pt resting, eyes closed w/ no complaints at this time. Will continue to monitor.

## 2019-04-28 NOTE — PROGRESS NOTES
full range of motion. No jugular venous distention. Trachea midline. Respiratory:  Normal respiratory effort. Clear to auscultation, bilaterally without signif Rales/Wheezes/Rhonchi. Cardiovascular: Regular rate and rhythm with normal S1/S2 without murmurs, rubs or gallops. Abdomen: Soft, non-tender, non-distended with normal bowel sounds. Musculoskeletal: No clubbing, cyanosis or edema bilaterally. Full range of motion without deformity. Skin: Skin color, texture, turgor normal.  No rashes or lesions. Neurologic:  Neurovascularly intact without any focal sensory/motor deficits. Cranial nerves: II-XII intact, grossly non-focal.  Psychiatric: Alert and oriented x 1, thought content not appropriate, not normal insight  Capillary Refill: Brisk,< 3 seconds   Peripheral Pulses: +2 palpable, equal bilaterally       Labs:   Recent Labs     04/27/19  1424 04/28/19  0714   WBC 13.2* 11.4*   HGB 10.8* 10.3*   HCT 32.8* 30.9*    204     Recent Labs     04/27/19  1424 04/28/19  0714   * 141   K 3.6 2.6*   CL 83* 87*   CO2 34* 37*   BUN 73* 73*   CREATININE 2.1* 2.1*   CALCIUM 9.4 9.3     Recent Labs     04/27/19  1424   AST 29   ALT 11   BILITOT 0.4   ALKPHOS 86     No results for input(s): INR in the last 72 hours. Recent Labs     04/27/19  1424 04/27/19  2215 04/28/19  0149   TROPONINI 0.04* 0.05* 0.05*       Urinalysis:      Lab Results   Component Value Date    NITRU Negative 04/27/2019    WBCUA 0-2 08/14/2018    BACTERIA Rare 08/14/2018    RBCUA 3-5 08/14/2018    BLOODU Negative 04/27/2019    SPECGRAV <=1.005 04/27/2019    GLUCOSEU Negative 04/27/2019       Radiology:  CT Head WO Contrast   Final Result   No acute intracranial abnormality. XR CHEST PORTABLE   Final Result   Findings most compatible with mild congestive heart failure and bibasilar   edema.   Possibility of right lower lobe pneumonia or chronic pleural and   parenchymal scarring right lower lobe should be included the differential

## 2019-04-28 NOTE — PROGRESS NOTES
Physical Therapy    Facility/Department: [facility] B[facility] - MED SURG  Initial Assessment    NAME: Blanca Davila  : 1935  MRN: [id number]    Date of Service: 2019    Discharge Recommendations:  Subacute/Skilled Nursing Facility   PT Equipment Recommendations  Equipment Needed: No(defer to SNF)    Assessment   Body structures, Functions, Activity limitations: Decreased functional mobility ; Decreased strength;Decreased endurance;Decreased coordination;Decreased safe awareness;Decreased cognition;Decreased balance  Assessment: Pt is 82 y/o male referred to PT with diagnosis of GRACE. Pt requires Min A for STS transitions and amb up to 20 ft this PM with RW d/t poor cog and dec balance. Recommend continued acute PT until safe d/c to SNF to further progress functional mobility. Treatment Diagnosis: dec activity ananda  Specific instructions for Next Treatment: progress functional mobility per pt ananda  Prognosis: Fair;Good  Decision Making: Medium Complexity  Patient Education: role of PT, d/c recs, use of call light, amb with RW; pt verbalizes understanding but requires reinforcement  Barriers to Learning: dec cog  REQUIRES PT FOLLOW UP: Yes  Activity Tolerance  Activity Tolerance: Patient limited by fatigue;Patient limited by endurance; Patient limited by cognitive status       Patient Diagnosis(es): There were no encounter diagnoses. has a past medical history of Acute MI ([facility] Arthritis, CKD (chronic kidney disease), stage III ([address] COPD (chronic obstructive pulmonary disease) ([address] Diabetes mellitus ([address] Hyperlipidemia, Hypertension, and Pacemaker. has a past surgical history that includes Coronary artery bypass graft; Cardiac surgery (4/10/2013); Abdomen surgery (10/15/13); and skin biopsy.     Restrictions  Restrictions/Precautions  Restrictions/Precautions: Fall Risk  Implants present? : Pacemaker  Position Activity Restriction  Other position/activity restrictions: Avasys, up with assist  Vision/Hearing Right: Unable to assess  Supine to Sit: Unable to assess  Sit to Supine: Unable to assess  Transfers  Sit to Stand: Minimal Assistance  Stand to sit: Minimal Assistance  Bed to Chair: Unable to assess  Ambulation  Ambulation?: Yes  Ambulation 1  Device: Rolling Walker  Other Apparatus: O2(3.5 L)  Assistance: Minimal assistance  Quality of Gait: extremely forward flexed throughout with tendency to amb with feet post to OLIVIA of RW, reciprocal, slow shuffled steps, no LOB, difficulty navigating around obstacles  Distance: 20' to toilet + 20 ' toilet to chair  Stairs/Curb  Stairs?: No              Plan   Plan  Times per week: 3-5x/wk  Plan weeks: 5/5/2019  Specific instructions for Next Treatment: progress functional mobility per pt ananda  Current Treatment Recommendations: Strengthening, Functional Mobility Training, Neuromuscular Re-education, Home Exercise Program, Transfer Training, Gait Training, Safety Education & Training, Balance Training, Endurance Training, Stair training, Patient/Caregiver Education & Training  Safety Devices  Type of devices:  All fall risk precautions in place, Call light within reach, Chair alarm in place, Patient at risk for falls, Gait belt, Left in chair, Sitter present  Restraints  Initially in place: No    G-Code  PT G-Codes  Functional Assessment Tool Used: AMPA  Score: 15  Functional Limitation: Mobility: Walking and moving around      AM-PAC Score  AM-PAC Inpatient Mobility Raw Score : 15  AM-PAC Inpatient T-Scale Score : 39.45  Mobility Inpatient CMS 0-100% Score: 57.7  Mobility Inpatient CMS G-Code Modifier : CK          Goals  Short term goals  Time Frame for Short term goals: 5/5/2019  Short term goal 1: Pt to be SUPVN for bed mobility  Short term goal 2: Pt to transfer bed<>chair Min A with RW  Short term goal 3: Pt to amb 50 ft Min A with RW  Short term goal 4: By 4/30: Pt to ananda 2x10 BLE ther ex to promote inc LE function and strength  Patient Goals   Patient goals : pt unable to verbalize goals, A&O x 1       Therapy Time   Individual Concurrent Group Co-treatment   Time In 1335         Time Out 1408         Minutes 33         Timed Code Treatment Minutes: 23 Minutes(10 for eval)       Marcelino Cunningham, PT, DPT #[id number]

## 2019-04-29 ENCOUNTER — PROCEDURE VISIT (OUTPATIENT)
Dept: CARDIOLOGY CLINIC | Age: 84
End: 2019-04-29

## 2019-04-29 ENCOUNTER — APPOINTMENT (OUTPATIENT)
Dept: ULTRASOUND IMAGING | Age: 84
DRG: 092 | End: 2019-04-29
Payer: MEDICARE

## 2019-04-29 DIAGNOSIS — Z95.810 CARDIAC RESYNCHRONIZATION THERAPY DEFIBRILLATOR (CRT-D) IN PLACE: Chronic | ICD-10-CM

## 2019-04-29 LAB
ALBUMIN SERPL-MCNC: 3.2 G/DL (ref 3.4–5)
ANION GAP SERPL CALCULATED.3IONS-SCNC: 11 MMOL/L (ref 3–16)
BUN BLDV-MCNC: 67 MG/DL (ref 7–20)
CALCIUM SERPL-MCNC: 9.2 MG/DL (ref 8.3–10.6)
CHLORIDE BLD-SCNC: 90 MMOL/L (ref 99–110)
CO2: 35 MMOL/L (ref 21–32)
CREAT SERPL-MCNC: 2 MG/DL (ref 0.8–1.3)
GFR AFRICAN AMERICAN: 39
GFR NON-AFRICAN AMERICAN: 32
GLUCOSE BLD-MCNC: 133 MG/DL (ref 70–99)
GLUCOSE BLD-MCNC: 141 MG/DL (ref 70–99)
GLUCOSE BLD-MCNC: 156 MG/DL (ref 70–99)
GLUCOSE BLD-MCNC: 169 MG/DL (ref 70–99)
GLUCOSE BLD-MCNC: 231 MG/DL (ref 70–99)
MAGNESIUM: 1.8 MG/DL (ref 1.8–2.4)
PERFORMED ON: ABNORMAL
PHOSPHORUS: 3.4 MG/DL (ref 2.5–4.9)
POTASSIUM SERPL-SCNC: 3.1 MMOL/L (ref 3.5–5.1)
SODIUM BLD-SCNC: 136 MMOL/L (ref 136–145)

## 2019-04-29 PROCEDURE — 2580000003 HC RX 258

## 2019-04-29 PROCEDURE — 83735 ASSAY OF MAGNESIUM: CPT

## 2019-04-29 PROCEDURE — 76770 US EXAM ABDO BACK WALL COMP: CPT

## 2019-04-29 PROCEDURE — 6360000002 HC RX W HCPCS: Performed by: NURSE PRACTITIONER

## 2019-04-29 PROCEDURE — 1200000000 HC SEMI PRIVATE

## 2019-04-29 PROCEDURE — 82570 ASSAY OF URINE CREATININE: CPT

## 2019-04-29 PROCEDURE — 6370000000 HC RX 637 (ALT 250 FOR IP): Performed by: PEDIATRICS

## 2019-04-29 PROCEDURE — 94640 AIRWAY INHALATION TREATMENT: CPT

## 2019-04-29 PROCEDURE — 82043 UR ALBUMIN QUANTITATIVE: CPT

## 2019-04-29 PROCEDURE — 36415 COLL VENOUS BLD VENIPUNCTURE: CPT

## 2019-04-29 PROCEDURE — 2700000000 HC OXYGEN THERAPY PER DAY

## 2019-04-29 PROCEDURE — 80069 RENAL FUNCTION PANEL: CPT

## 2019-04-29 PROCEDURE — 2580000003 HC RX 258: Performed by: PEDIATRICS

## 2019-04-29 PROCEDURE — 94761 N-INVAS EAR/PLS OXIMETRY MLT: CPT

## 2019-04-29 RX ORDER — SODIUM CHLORIDE 9 MG/ML
INJECTION, SOLUTION INTRAVENOUS
Status: COMPLETED
Start: 2019-04-29 | End: 2019-04-29

## 2019-04-29 RX ADMIN — FUROSEMIDE 40 MG: 40 TABLET ORAL at 10:15

## 2019-04-29 RX ADMIN — APIXABAN 2.5 MG: 5 TABLET, FILM COATED ORAL at 21:00

## 2019-04-29 RX ADMIN — ASPIRIN 81 MG: 81 TABLET, COATED ORAL at 10:15

## 2019-04-29 RX ADMIN — Medication 2 PUFF: at 19:33

## 2019-04-29 RX ADMIN — POTASSIUM CHLORIDE 10 MEQ: 10 INJECTION, SOLUTION INTRAVENOUS at 05:23

## 2019-04-29 RX ADMIN — Medication 10 ML: at 21:01

## 2019-04-29 RX ADMIN — RANOLAZINE 500 MG: 500 TABLET, FILM COATED, EXTENDED RELEASE ORAL at 10:14

## 2019-04-29 RX ADMIN — FERROUS SULFATE TAB 325 MG (65 MG ELEMENTAL FE) 325 MG: 325 (65 FE) TAB at 17:33

## 2019-04-29 RX ADMIN — Medication 10 ML: at 10:15

## 2019-04-29 RX ADMIN — INSULIN LISPRO 1 UNITS: 100 INJECTION, SOLUTION INTRAVENOUS; SUBCUTANEOUS at 10:09

## 2019-04-29 RX ADMIN — FERROUS SULFATE TAB 325 MG (65 MG ELEMENTAL FE) 325 MG: 325 (65 FE) TAB at 10:14

## 2019-04-29 RX ADMIN — FLUTICASONE PROPIONATE 1 SPRAY: 50 SPRAY, METERED NASAL at 10:14

## 2019-04-29 RX ADMIN — POTASSIUM CHLORIDE 10 MEQ: 10 INJECTION, SOLUTION INTRAVENOUS at 18:47

## 2019-04-29 RX ADMIN — SODIUM CHLORIDE 1000 ML: 9 INJECTION, SOLUTION INTRAVENOUS at 21:20

## 2019-04-29 RX ADMIN — LISINOPRIL 2.5 MG: 2.5 TABLET ORAL at 10:18

## 2019-04-29 RX ADMIN — ATORVASTATIN CALCIUM 20 MG: 10 TABLET, FILM COATED ORAL at 10:15

## 2019-04-29 RX ADMIN — RANOLAZINE 500 MG: 500 TABLET, FILM COATED, EXTENDED RELEASE ORAL at 21:00

## 2019-04-29 RX ADMIN — SPIRONOLACTONE 25 MG: 25 TABLET ORAL at 10:15

## 2019-04-29 RX ADMIN — ACETAMINOPHEN 650 MG: 325 TABLET, FILM COATED ORAL at 10:14

## 2019-04-29 RX ADMIN — POTASSIUM CHLORIDE 10 MEQ: 10 INJECTION, SOLUTION INTRAVENOUS at 23:07

## 2019-04-29 RX ADMIN — POTASSIUM CHLORIDE 10 MEQ: 10 INJECTION, SOLUTION INTRAVENOUS at 03:08

## 2019-04-29 RX ADMIN — ACETAMINOPHEN 650 MG: 325 TABLET, FILM COATED ORAL at 21:00

## 2019-04-29 RX ADMIN — Medication 1 TABLET: at 10:15

## 2019-04-29 RX ADMIN — POTASSIUM CHLORIDE 10 MEQ: 10 INJECTION, SOLUTION INTRAVENOUS at 00:19

## 2019-04-29 RX ADMIN — CARVEDILOL 3.12 MG: 3.12 TABLET, FILM COATED ORAL at 10:18

## 2019-04-29 RX ADMIN — CARVEDILOL 3.12 MG: 3.12 TABLET, FILM COATED ORAL at 21:00

## 2019-04-29 RX ADMIN — POTASSIUM CHLORIDE 10 MEQ: 10 INJECTION, SOLUTION INTRAVENOUS at 21:20

## 2019-04-29 RX ADMIN — INSULIN LISPRO 1 UNITS: 100 INJECTION, SOLUTION INTRAVENOUS; SUBCUTANEOUS at 21:03

## 2019-04-29 RX ADMIN — SODIUM CHLORIDE 250 ML: 9 INJECTION, SOLUTION INTRAVENOUS at 21:20

## 2019-04-29 RX ADMIN — INSULIN LISPRO 2 UNITS: 100 INJECTION, SOLUTION INTRAVENOUS; SUBCUTANEOUS at 12:44

## 2019-04-29 RX ADMIN — INSULIN GLARGINE 25 UNITS: 100 INJECTION, SOLUTION SUBCUTANEOUS at 21:03

## 2019-04-29 RX ADMIN — ACETAMINOPHEN 650 MG: 325 TABLET, FILM COATED ORAL at 13:28

## 2019-04-29 RX ADMIN — VITAMIN D, TAB 1000IU (100/BT) 1000 UNITS: 25 TAB at 10:14

## 2019-04-29 RX ADMIN — INSULIN LISPRO 1 UNITS: 100 INJECTION, SOLUTION INTRAVENOUS; SUBCUTANEOUS at 17:33

## 2019-04-29 RX ADMIN — APIXABAN 2.5 MG: 5 TABLET, FILM COATED ORAL at 10:15

## 2019-04-29 RX ADMIN — PANTOPRAZOLE SODIUM 40 MG: 40 TABLET, DELAYED RELEASE ORAL at 10:15

## 2019-04-29 ASSESSMENT — ENCOUNTER SYMPTOMS
CHEST TIGHTNESS: 0
COUGH: 0
SHORTNESS OF BREATH: 0
DIARRHEA: 0
ABDOMINAL PAIN: 0
CONSTIPATION: 0
TROUBLE SWALLOWING: 0
VOMITING: 0
BACK PAIN: 0
NAUSEA: 0
RHINORRHEA: 0
SORE THROAT: 0

## 2019-04-29 ASSESSMENT — PAIN SCALES - GENERAL
PAINLEVEL_OUTOF10: 0
PAINLEVEL_OUTOF10: 4
PAINLEVEL_OUTOF10: 0
PAINLEVEL_OUTOF10: 0
PAINLEVEL_OUTOF10: 4

## 2019-04-29 NOTE — PROGRESS NOTES
- aspirin 81 mg po daily   - atorvastatin 20 daily   - carvedilol 3.125 mg po BID   - furosemide 40 daily   - lisinopril 2.5 mg po daily   - ranolazine 500 mg ER BID   - spironolactone 25 mg po daily   - telemetry   -concerns for pacer malfunction on tele, cards asked to review    Chronic back pain, neck pain:   - acetaminophen TID   - lidoderm patch topical for 12 hrs / day   - HOLD percocet      Difficulty ambulating / failure to thrive:   - can walk about 5 feet on his own with help from walker / assistance (per part of hx) but then his son corrected to say he could really walk further   - PT/OT to see and assess   - case management to see as suspect he will either need home care RN or SNF   - fall precautions      Diabetic:   - glargine 25 units qhs   - SSI   - HbA1c - ordered   - Last A1c of 6.1 in January 2019      Hyponatremia:   - suspect diminished effect circulating volume given SHF, vs chronic furosemide use   - follow      Normocytic anemia:   - continue iron supplementation 325 po BID   - hemoglobin seems stable from prior      COPD:   - albuterol 2 puffs q6 hrs prn   - symbicort --> continue formulary equivalent      GERD:   - continue PPI (formulary)      Neuropathy:   - gabapentin 300 mg po qhs      Rhinitis:   - fluticasone daily      Constipation:   - miralax 17 daily prn      Skin:   - ammonium lactate 12% cream BID PRN      Supplement:   - vitamin D - HELD      Sleep aid:   - ambien - HELD      Bed sore - stage 1 pressure sore on sacrum / buttocks:   - skin care ordered / RNs to [employer]  - old appearing stool in diaper on presentation            DVT Prophylaxis: on apixaban  Diet: DIET CARB CONTROL;  Code Status: DNR-CCA    PT/OT Eval Status: rec snf    Dispo - pending cards Ashlee Hooper MD

## 2019-04-29 NOTE — CARE COORDINATION
[address] Transitions Interview-BPCI    2019    Patient: Makenzie Carrion Patient : 1935   MRN: [id number]  Reason for Admission: GRACE,   RARS: Readmission Risk Score: 29         Spoke with: patient Wanda Bedolla, wife Myriam Merrill      Readmission Risk  Patient Active Problem List   Diagnosis    Hx perforated duodenal ulcer    Arterial hypotension    Elevated brain natriuretic peptide (BNP) level    Stage 3 chronic [facility] disease ([facility])    Chronic normocytic anemia    Cardiomyopathy, ischemic    Bi-V PPM/AICD    IDDM (insulin dependent diabetes mellitus) ([facility])    Acute renal failure with tubular necrosis ([address]    CAD s/p CABG & stents    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 coli & Pseudomonas (2018)    GRACE-on-CKD3    Septic shock ([address]    Acute on chronic respiratory failure with hypoxemia ([address]    Former smoker    Pacemaker malfunction, initial encounter    Hyperlipidemia    Acute on chronic systolic congestive heart failure ([facility])    Fatigue    PAF (paroxysmal atrial fibrillation) ([facility])    GRACE (acute kidney injury) ([address]    Encephalopathy    GRACE (acute kidney injury) ([address]    Elevated troponin       Inpatient Assessment  Care Transitions Summary    Care Transitions Inpatient Review  Medication Review  Do you have all of your prescriptions and are they filled?:  No   Are you able to afford your medications?:  Yes  How often do you have difficulty taking your medications?:  I always take them as prescribed.   Housing Review  Who do you live with?:  Partner/Spouse/SO  Are you an active caregiver in

## 2019-04-29 NOTE — CONSULTS
[address] [address]                                  CONSULTATION    PATIENT NAME: Jeff Wilson                      :        1935  MED REC NO:   [id number]                          ROOM:       0139  ACCOUNT NO:   [de-identified]                           ADMIT DATE: 2019  PROVIDER:     Jonnie Meraz MD    CONSULT DATE:  2019    RENAL CONSULT    INDICATION FOR CONSULT:  [facility] has been asked to  consult on the patient at the request of Dr. Madai De La Fuente to evaluate the  patient's renal dysfunction. HISTORY OF PRESENT ILLNESS:  The patient is an 45-year-old male who has  known stage 3 chronic kidney disease, who presented to the emergency  room with a complaint of feeling off, not feeling like himself. It was  noted by his wife that the  was confused. History is obtained  from interviewing the patient and reviewing the medical records in [facility]. The patient does not recall the events or why he came to the emergency  room. According to the records, the patient found himself on the way to  the bathroom and did not know where he was going. The patient was  recently seen by my colleague, Dr. Faiza Vazquez, in his office on . The  patient is established patient with baseline serum creatinine ranging  around 1.8 to high of 2.2. During the recent visit, his allopurinol was  increased to 300. The patient denies recent acute gout attack. He  denies change in urine output, difficulty voiding, gross hematuria,  dysuria, flank pain, use of NSAIDs, passage of kidney stones.     PAST MEDICAL HISTORY:  Significant for hypertension, coronary artery  disease with previous CABG and stents, combined systolic and diastolic  heart failure, iron-deficiency anemia, remote perforated duodenal ulcer,  ischemic cardiomyopathy, BiV pacemaker and ICD, insulin-dependent  diabetes, COPD, respiratory failure,

## 2019-04-30 VITALS
OXYGEN SATURATION: 98 % | TEMPERATURE: 97.7 F | BODY MASS INDEX: 33.18 KG/M2 | DIASTOLIC BLOOD PRESSURE: 52 MMHG | HEIGHT: 68 IN | WEIGHT: 218.92 LBS | SYSTOLIC BLOOD PRESSURE: 96 MMHG | RESPIRATION RATE: 16 BRPM | HEART RATE: 69 BPM

## 2019-04-30 LAB
CREATININE URINE: 31.3 MG/DL (ref 39–259)
GLUCOSE BLD-MCNC: 138 MG/DL (ref 70–99)
GLUCOSE BLD-MCNC: 200 MG/DL (ref 70–99)
GLUCOSE BLD-MCNC: 221 MG/DL (ref 70–99)
MICROALBUMIN UR-MCNC: <1.2 MG/DL
MICROALBUMIN/CREAT UR-RTO: ABNORMAL MG/G (ref 0–30)
PERFORMED ON: ABNORMAL
POTASSIUM REFLEX MAGNESIUM: 3.8 MMOL/L (ref 3.5–5.1)

## 2019-04-30 PROCEDURE — 94761 N-INVAS EAR/PLS OXIMETRY MLT: CPT

## 2019-04-30 PROCEDURE — 97530 THERAPEUTIC ACTIVITIES: CPT

## 2019-04-30 PROCEDURE — 97116 GAIT TRAINING THERAPY: CPT

## 2019-04-30 PROCEDURE — 6370000000 HC RX 637 (ALT 250 FOR IP): Performed by: PEDIATRICS

## 2019-04-30 PROCEDURE — 36415 COLL VENOUS BLD VENIPUNCTURE: CPT

## 2019-04-30 PROCEDURE — 94640 AIRWAY INHALATION TREATMENT: CPT

## 2019-04-30 PROCEDURE — 2700000000 HC OXYGEN THERAPY PER DAY

## 2019-04-30 PROCEDURE — 84132 ASSAY OF SERUM POTASSIUM: CPT

## 2019-04-30 PROCEDURE — 97110 THERAPEUTIC EXERCISES: CPT

## 2019-04-30 PROCEDURE — 2580000003 HC RX 258: Performed by: PEDIATRICS

## 2019-04-30 PROCEDURE — 6360000002 HC RX W HCPCS: Performed by: NURSE PRACTITIONER

## 2019-04-30 RX ORDER — M-VIT,TX,IRON,MINS/CALC/FOLIC 27MG-0.4MG
1 TABLET ORAL DAILY
Status: ON HOLD | COMMUNITY
Start: 2019-05-01 | End: 2019-10-23

## 2019-04-30 RX ORDER — LIDOCAINE 4 G/G
1 PATCH TOPICAL DAILY
Qty: 3 PATCH | Refills: 0 | Status: SHIPPED | OUTPATIENT
Start: 2019-05-01 | End: 2019-05-04

## 2019-04-30 RX ORDER — GABAPENTIN 300 MG/1
300 CAPSULE ORAL 2 TIMES DAILY PRN
Qty: 4 CAPSULE | Refills: 0 | Status: ON HOLD | OUTPATIENT
Start: 2019-04-30 | End: 2020-03-05

## 2019-04-30 RX ORDER — OXYCODONE HYDROCHLORIDE AND ACETAMINOPHEN 5; 325 MG/1; MG/1
1 TABLET ORAL DAILY PRN
Qty: 6 TABLET | Refills: 0 | Status: SHIPPED | OUTPATIENT
Start: 2019-04-30 | End: 2019-05-03

## 2019-04-30 RX ADMIN — CARVEDILOL 3.12 MG: 3.12 TABLET, FILM COATED ORAL at 10:52

## 2019-04-30 RX ADMIN — INSULIN LISPRO 2 UNITS: 100 INJECTION, SOLUTION INTRAVENOUS; SUBCUTANEOUS at 11:15

## 2019-04-30 RX ADMIN — FUROSEMIDE 40 MG: 40 TABLET ORAL at 10:53

## 2019-04-30 RX ADMIN — Medication 1 TABLET: at 10:53

## 2019-04-30 RX ADMIN — FERROUS SULFATE TAB 325 MG (65 MG ELEMENTAL FE) 325 MG: 325 (65 FE) TAB at 10:53

## 2019-04-30 RX ADMIN — LISINOPRIL 2.5 MG: 2.5 TABLET ORAL at 10:52

## 2019-04-30 RX ADMIN — VITAMIN D, TAB 1000IU (100/BT) 1000 UNITS: 25 TAB at 10:52

## 2019-04-30 RX ADMIN — ASPIRIN 81 MG: 81 TABLET, COATED ORAL at 10:54

## 2019-04-30 RX ADMIN — ATORVASTATIN CALCIUM 20 MG: 10 TABLET, FILM COATED ORAL at 10:52

## 2019-04-30 RX ADMIN — INSULIN LISPRO 2 UNITS: 100 INJECTION, SOLUTION INTRAVENOUS; SUBCUTANEOUS at 16:32

## 2019-04-30 RX ADMIN — FLUTICASONE PROPIONATE 1 SPRAY: 50 SPRAY, METERED NASAL at 09:13

## 2019-04-30 RX ADMIN — POTASSIUM CHLORIDE 10 MEQ: 10 INJECTION, SOLUTION INTRAVENOUS at 00:58

## 2019-04-30 RX ADMIN — FERROUS SULFATE TAB 325 MG (65 MG ELEMENTAL FE) 325 MG: 325 (65 FE) TAB at 17:16

## 2019-04-30 RX ADMIN — ACETAMINOPHEN 650 MG: 325 TABLET, FILM COATED ORAL at 15:44

## 2019-04-30 RX ADMIN — RANOLAZINE 500 MG: 500 TABLET, FILM COATED, EXTENDED RELEASE ORAL at 10:52

## 2019-04-30 RX ADMIN — Medication 2 PUFF: at 09:01

## 2019-04-30 RX ADMIN — SPIRONOLACTONE 25 MG: 25 TABLET ORAL at 10:53

## 2019-04-30 RX ADMIN — APIXABAN 2.5 MG: 5 TABLET, FILM COATED ORAL at 10:53

## 2019-04-30 RX ADMIN — ACETAMINOPHEN 650 MG: 325 TABLET, FILM COATED ORAL at 10:52

## 2019-04-30 RX ADMIN — PANTOPRAZOLE SODIUM 40 MG: 40 TABLET, DELAYED RELEASE ORAL at 05:51

## 2019-04-30 RX ADMIN — Medication 10 ML: at 10:54

## 2019-04-30 ASSESSMENT — PAIN DESCRIPTION - PAIN TYPE
TYPE: ACUTE PAIN
TYPE: ACUTE PAIN

## 2019-04-30 ASSESSMENT — PAIN DESCRIPTION - LOCATION
LOCATION: GENERALIZED
LOCATION: GENERALIZED

## 2019-04-30 ASSESSMENT — PAIN SCALES - GENERAL
PAINLEVEL_OUTOF10: 5

## 2019-04-30 NOTE — DISCHARGE SUMMARY
Hospital Medicine Discharge Summary    Patient ID: Juan Jose Hsieh      Patient's PCP: Onesimo Richards Date: 4/27/2019     Discharge Date: 4/30/2019      Admitting Physician: Awa Dale MD     Discharge Physician: Wendy Mirza MD     Discharge Diagnoses: Active Hospital Problems    Diagnosis Date Noted    GRAEC (acute kidney injury) ([facility] [N17.9] 04/27/2019    Encephalopathy [G93.40] 04/27/2019    GRACE (acute kidney injury) ([facility] [address] [N17.9] 04/27/2019    Elevated troponin [R74.8] 04/27/2019       The patient was seen and examined on day of discharge and this discharge summary is in conjunction with any daily progress note from day of discharge. Hospital Course:   History Of Present Illness:      81 y.o. male who presented to [facility] with inability to walk this morning when he needed to go to the bathroom. He soiled himself. His wife reported that he was confused. Mauro Ford reports \"I couldn't figure out where in the hell I was at. \" His wife called 911 and he was brought to the ED. Here in the ED his wife still thinks he isn't acting quite right. He reports ongoing back pain. No headache, no chest pain.      He reports medication compliance - his wife manages his medications. He reports a baseline weight of about 213 lbs. His wife thinks his last weight was >220 lbs. In ED weight of 210 lbs notes. Denies leg swelling. Denies PND.    He usually falls asleep in a recliner at baseline.      Per ED staff  - no chest pain   - thought to be dry by ED staff on presentation - given 1 L IVF   - RN in ED thought he was hallucinating   - CXR ambiguous- given levofloxacin in the ED               Encephalopathy, with fatigue, intermittent confusion:   --with concern for possible dementia however cannot determine this from a single exam alone - will workup and need f/u as outpt  - TSH wnl  - B12/folate wnl  -  ammonia (as workup) wnl  - drug screen    - suspect multifactorial -

## 2019-04-30 NOTE — PROGRESS NOTES
Physical Therapy  Facility/Department: [facility] B3 - MED SURG  Daily Treatment Note  NAME: Alpesh Paredes  : 1935  MRN: [id number]    Date of Service: 2019    Discharge Recommendations:  [address]        Patient Diagnosis(es): The primary encounter diagnosis was GRACE (acute kidney injury) ([facility] [address] Diagnoses of Elevated troponin and Other fatigue were also pertinent to this visit. has a past medical history of Acute MI ([facility] [address] Arthritis, CKD (chronic kidney disease), stage III ([facility] [address] COPD (chronic obstructive pulmonary disease) ([facility] [address] Diabetes mellitus ([facility] [address] Hyperlipidemia, Hypertension, and Pacemaker. has a past surgical history that includes Coronary artery bypass graft; Cardiac surgery (4/10/2013); Abdomen surgery (10/15/13); and skin biopsy. Restrictions  Restrictions/Precautions  Restrictions/Precautions: Fall Risk(Simultaneous filing. User may not have seen previous data.)  Implants present? : Pacemaker(Simultaneous filing. User may not have seen previous data.)  Position Activity Restriction  Other position/activity restrictions: Avasys, up with assist(Simultaneous filing. User may not have seen previous data.)  Subjective   General  Chart Reviewed: Yes  Response To Previous Treatment: Patient with no complaints from previous session. Family / Caregiver Present: No  Referring Practitioner: Pablo Rivera MD  Subjective  Subjective: Pt resting in bed upon entry, RN cleared pt for therapy  General Comment  Comments: Pt agitated, initially refusing therapy, refusing to let go of phone because \"I'm waiting for my wife to call\". With max encouragement agreed to get to chair  Pain Screening  Patient Currently in Pain: Yes  Pain Assessment  Pain Assessment: 0-10  Pain Level: 5  Pain Type: Acute pain  Pain Location: Generalized  Non-Pharmaceutical Pain Intervention(s): Ambulation/Increased Activity;Repositioned; Therapeutic presence  Vital Signs  Patient Currently in Pain:

## 2019-04-30 NOTE — PROGRESS NOTES
VSS - afebrile. Pt is alert and oriented x 4 with no reported history of falls. Assessment completed as charted. Bed is in lowest position with 2/4 bed rails raised, bed alarm turned on, wheels locked and call light within reach - patient wearing non-skid socks and verbalizes understanding to call out for assistance. No further requests at this time. Will continue to monitor.      Vitals:    04/29/19 2049   BP: (!) 115/58   Pulse: 80   Resp: 18   Temp: 97.5 °F (36.4 °C)   SpO2: 100%

## 2019-04-30 NOTE — PROGRESS NOTES
Plan   Plan  Times per week: 2-4x    AM-PAC Score        AM-PAC Inpatient Daily Activity Raw Score: 13  AM-PAC Inpatient ADL T-Scale Score : 32.03  ADL Inpatient CMS 0-100% Score: 63.03  ADL Inpatient CMS G-Code Modifier : CL    Goals  Short term goals  Time Frame for Short term goals: for 1 week  Short term goal 1: Perform toilet transfer with CGA with RW  Short term goal 2: Perform toileting with CGA by 5/4  Short term goal 3: Wash hands at sink with CGA  Short term goal 4: Mod A to don pants  Patient Goals   Patient goals : Pt did not state       Therapy Time   Individual Concurrent Group Co-treatment   Time In 1157         Time Out 1210         Minutes 13         Timed Code Treatment Minutes: 13 Minutes       LUNA Bullard/L

## 2019-05-27 PROBLEM — R77.8 ELEVATED TROPONIN: Status: RESOLVED | Noted: 2019-04-27 | Resolved: 2019-05-27

## 2019-06-10 ENCOUNTER — HOSPITAL ENCOUNTER (OUTPATIENT)
Age: 84
Setting detail: SPECIMEN
Discharge: HOME | End: 2019-06-10
Payer: MEDICARE

## 2019-06-10 LAB
ALBUMIN SERPL-MCNC: 3.9 G/DL (ref 3.4–5)
ANION GAP SERPL CALCULATED.3IONS-SCNC: 20 MMOL/L (ref 3–16)
BASOPHILS ABSOLUTE: 0 K/UL (ref 0–0.2)
BASOPHILS RELATIVE PERCENT: 0.4 %
BILIRUBIN URINE: NEGATIVE
BLOOD, URINE: NEGATIVE
BUN BLDV-MCNC: 36 MG/DL (ref 7–20)
CALCIUM SERPL-MCNC: 9.3 MG/DL (ref 8.3–10.6)
CHLORIDE BLD-SCNC: 99 MMOL/L (ref 99–110)
CLARITY: CLEAR
CO2: 23 MMOL/L (ref 21–32)
COLOR: YELLOW
CREAT SERPL-MCNC: 1.9 MG/DL (ref 0.8–1.3)
CREATININE URINE: 76.7 MG/DL (ref 39–259)
EOSINOPHILS ABSOLUTE: 0.3 K/UL (ref 0–0.6)
EOSINOPHILS RELATIVE PERCENT: 2.2 %
GFR AFRICAN AMERICAN: 41
GFR NON-AFRICAN AMERICAN: 34
GLUCOSE BLD-MCNC: 111 MG/DL (ref 70–99)
GLUCOSE URINE: NEGATIVE MG/DL
HCT VFR BLD CALC: 31.6 % (ref 40.5–52.5)
HEMOGLOBIN: 10 G/DL (ref 13.5–17.5)
KETONES, URINE: NEGATIVE MG/DL
LEUKOCYTE ESTERASE, URINE: NEGATIVE
LYMPHOCYTES ABSOLUTE: 3 K/UL (ref 1–5.1)
LYMPHOCYTES RELATIVE PERCENT: 22 %
MCH RBC QN AUTO: 30.5 PG (ref 26–34)
MCHC RBC AUTO-ENTMCNC: 31.7 G/DL (ref 31–36)
MCV RBC AUTO: 96.1 FL (ref 80–100)
MICROSCOPIC EXAMINATION: NORMAL
MONOCYTES ABSOLUTE: 1.5 K/UL (ref 0–1.3)
MONOCYTES RELATIVE PERCENT: 11.1 %
NEUTROPHILS ABSOLUTE: 8.7 K/UL (ref 1.7–7.7)
NEUTROPHILS RELATIVE PERCENT: 64.3 %
NITRITE, URINE: NEGATIVE
PDW BLD-RTO: 17.5 % (ref 12.4–15.4)
PH UA: 5.5 (ref 5–8)
PHOSPHORUS: 3.8 MG/DL (ref 2.5–4.9)
PLATELET # BLD: 355 K/UL (ref 135–450)
PMV BLD AUTO: 8 FL (ref 5–10.5)
POTASSIUM SERPL-SCNC: 4.6 MMOL/L (ref 3.5–5.1)
PROTEIN PROTEIN: 9 MG/DL
PROTEIN UA: NEGATIVE MG/DL
RBC # BLD: 3.28 M/UL (ref 4.2–5.9)
SODIUM BLD-SCNC: 142 MMOL/L (ref 136–145)
SPECIFIC GRAVITY UA: 1.01 (ref 1–1.03)
URIC ACID, SERUM: 9.7 MG/DL (ref 3.5–7.2)
URINE REFLEX TO CULTURE: NORMAL
URINE TYPE: NORMAL
UROBILINOGEN, URINE: 0.2 E.U./DL
WBC # BLD: 13.5 K/UL (ref 4–11)

## 2019-06-10 PROCEDURE — 81003 URINALYSIS AUTO W/O SCOPE: CPT

## 2019-06-10 PROCEDURE — 85025 COMPLETE CBC W/AUTO DIFF WBC: CPT

## 2019-06-10 PROCEDURE — 84550 ASSAY OF BLOOD/URIC ACID: CPT

## 2019-06-10 PROCEDURE — 82570 ASSAY OF URINE CREATININE: CPT

## 2019-06-10 PROCEDURE — 84156 ASSAY OF PROTEIN URINE: CPT

## 2019-06-10 PROCEDURE — 80069 RENAL FUNCTION PANEL: CPT

## 2019-06-10 PROCEDURE — 36415 COLL VENOUS BLD VENIPUNCTURE: CPT

## 2019-08-12 ENCOUNTER — HOSPITAL ENCOUNTER (OUTPATIENT)
Age: 84
Setting detail: SPECIMEN
Discharge: HOME | End: 2019-08-12
Payer: MEDICARE

## 2019-08-12 LAB
BILIRUBIN URINE: NEGATIVE
BLOOD, URINE: NEGATIVE
CLARITY: CLEAR
COLOR: YELLOW
GLUCOSE URINE: NEGATIVE MG/DL
KETONES, URINE: NEGATIVE MG/DL
LEUKOCYTE ESTERASE, URINE: NEGATIVE
MICROSCOPIC EXAMINATION: NORMAL
NITRITE, URINE: NEGATIVE
PH UA: 6 (ref 5–8)
PROTEIN UA: NEGATIVE MG/DL
SPECIFIC GRAVITY UA: <=1.005 (ref 1–1.03)
URINE TYPE: NORMAL
UROBILINOGEN, URINE: 0.2 E.U./DL

## 2019-08-12 PROCEDURE — 87086 URINE CULTURE/COLONY COUNT: CPT

## 2019-08-12 PROCEDURE — 81003 URINALYSIS AUTO W/O SCOPE: CPT

## 2019-08-14 LAB — URINE CULTURE, ROUTINE: NORMAL

## 2019-10-22 ENCOUNTER — APPOINTMENT (OUTPATIENT)
Dept: GENERAL RADIOLOGY | Age: 84
DRG: 871 | End: 2019-10-22
Payer: MEDICARE

## 2019-10-22 ENCOUNTER — HOSPITAL ENCOUNTER (INPATIENT)
Age: 84
LOS: 5 days | Discharge: NURSING, REHAB OR LONG-TERM CARE | DRG: 871 | End: 2019-10-27
Attending: EMERGENCY MEDICINE | Admitting: INTERNAL MEDICINE
Payer: MEDICARE

## 2019-10-22 DIAGNOSIS — M54.50 CHRONIC BILATERAL LOW BACK PAIN WITHOUT SCIATICA: ICD-10-CM

## 2019-10-22 DIAGNOSIS — A41.9 SEPTICEMIA (HCC): Primary | ICD-10-CM

## 2019-10-22 DIAGNOSIS — J18.9 PNEUMONIA DUE TO ORGANISM: ICD-10-CM

## 2019-10-22 DIAGNOSIS — G89.29 CHRONIC BILATERAL LOW BACK PAIN WITHOUT SCIATICA: ICD-10-CM

## 2019-10-22 LAB
A/G RATIO: 0.7 (ref 1.1–2.2)
ALBUMIN SERPL-MCNC: 3.4 G/DL (ref 3.4–5)
ALP BLD-CCNC: 84 U/L (ref 40–129)
ALT SERPL-CCNC: 10 U/L (ref 10–40)
ANION GAP SERPL CALCULATED.3IONS-SCNC: 13 MMOL/L (ref 3–16)
ANISOCYTOSIS: ABNORMAL
AST SERPL-CCNC: 16 U/L (ref 15–37)
BANDED NEUTROPHILS RELATIVE PERCENT: 1 % (ref 0–7)
BASE EXCESS ARTERIAL: 5.8 MMOL/L (ref -3–3)
BASOPHILS ABSOLUTE: 0 K/UL (ref 0–0.2)
BASOPHILS RELATIVE PERCENT: 0 %
BILIRUB SERPL-MCNC: 0.5 MG/DL (ref 0–1)
BILIRUBIN URINE: NEGATIVE
BLOOD, URINE: NEGATIVE
BUN BLDV-MCNC: 52 MG/DL (ref 7–20)
CALCIUM SERPL-MCNC: 9.8 MG/DL (ref 8.3–10.6)
CARBOXYHEMOGLOBIN ARTERIAL: 0.6 % (ref 0–1.5)
CHLORIDE BLD-SCNC: 88 MMOL/L (ref 99–110)
CLARITY: CLEAR
CO2: 28 MMOL/L (ref 21–32)
COLOR: YELLOW
CREAT SERPL-MCNC: 2.1 MG/DL (ref 0.8–1.3)
EOSINOPHILS ABSOLUTE: 0 K/UL (ref 0–0.6)
EOSINOPHILS RELATIVE PERCENT: 0 %
GFR AFRICAN AMERICAN: 37
GFR NON-AFRICAN AMERICAN: 30
GLOBULIN: 4.7 G/DL
GLUCOSE BLD-MCNC: 165 MG/DL (ref 70–99)
GLUCOSE BLD-MCNC: 179 MG/DL (ref 70–99)
GLUCOSE BLD-MCNC: 274 MG/DL (ref 70–99)
GLUCOSE URINE: NEGATIVE MG/DL
HCO3 ARTERIAL: 31 MMOL/L (ref 21–29)
HCT VFR BLD CALC: 30.9 % (ref 40.5–52.5)
HEMATOLOGY PATH CONSULT: YES
HEMOGLOBIN, ART, EXTENDED: 10.6 G/DL (ref 13.5–17.5)
HEMOGLOBIN: 9.9 G/DL (ref 13.5–17.5)
HYPOCHROMIA: ABNORMAL
KETONES, URINE: NEGATIVE MG/DL
LACTIC ACID: 1.4 MMOL/L (ref 0.4–2)
LEUKOCYTE ESTERASE, URINE: NEGATIVE
LYMPHOCYTES ABSOLUTE: 0.9 K/UL (ref 1–5.1)
LYMPHOCYTES RELATIVE PERCENT: 4 %
MCH RBC QN AUTO: 29.6 PG (ref 26–34)
MCHC RBC AUTO-ENTMCNC: 32.2 G/DL (ref 31–36)
MCV RBC AUTO: 92 FL (ref 80–100)
METHEMOGLOBIN ARTERIAL: 0.2 %
MICROSCOPIC EXAMINATION: NORMAL
MONOCYTES ABSOLUTE: 4.3 K/UL (ref 0–1.3)
MONOCYTES RELATIVE PERCENT: 19 %
NEUTROPHILS ABSOLUTE: 17.6 K/UL (ref 1.7–7.7)
NEUTROPHILS RELATIVE PERCENT: 76 %
NITRITE, URINE: NEGATIVE
O2 CONTENT ARTERIAL: 14 ML/DL
O2 SAT, ARTERIAL: 95.4 %
O2 THERAPY: ABNORMAL
OVALOCYTES: ABNORMAL
PCO2 ARTERIAL: 47.6 MMHG (ref 35–45)
PDW BLD-RTO: 18.9 % (ref 12.4–15.4)
PERFORMED ON: ABNORMAL
PERFORMED ON: ABNORMAL
PH ARTERIAL: 7.43 (ref 7.35–7.45)
PH UA: 6 (ref 5–8)
PLATELET # BLD: 328 K/UL (ref 135–450)
PLATELET SLIDE REVIEW: ADEQUATE
PMV BLD AUTO: 7.8 FL (ref 5–10.5)
PO2 ARTERIAL: 81.6 MMHG (ref 75–108)
POIKILOCYTES: ABNORMAL
POLYCHROMASIA: ABNORMAL
POTASSIUM SERPL-SCNC: 4.4 MMOL/L (ref 3.5–5.1)
PRO-BNP: 3241 PG/ML (ref 0–449)
PROCALCITONIN: 0.58 NG/ML (ref 0–0.15)
PROTEIN UA: NEGATIVE MG/DL
RAPID INFLUENZA  B AGN: NEGATIVE
RAPID INFLUENZA A AGN: NEGATIVE
RBC # BLD: 3.36 M/UL (ref 4.2–5.9)
SLIDE REVIEW: ABNORMAL
SODIUM BLD-SCNC: 129 MMOL/L (ref 136–145)
SPECIFIC GRAVITY UA: 1.01 (ref 1–1.03)
TARGET CELLS: ABNORMAL
TCO2 ARTERIAL: 32.4 MMOL/L
TEAR DROP CELLS: ABNORMAL
TOTAL PROTEIN: 8.1 G/DL (ref 6.4–8.2)
TROPONIN: 0.05 NG/ML
URINE TYPE: NORMAL
UROBILINOGEN, URINE: 0.2 E.U./DL
WBC # BLD: 22.8 K/UL (ref 4–11)

## 2019-10-22 PROCEDURE — 83880 ASSAY OF NATRIURETIC PEPTIDE: CPT

## 2019-10-22 PROCEDURE — 99223 1ST HOSP IP/OBS HIGH 75: CPT | Performed by: INTERNAL MEDICINE

## 2019-10-22 PROCEDURE — 6370000000 HC RX 637 (ALT 250 FOR IP): Performed by: INTERNAL MEDICINE

## 2019-10-22 PROCEDURE — 2580000003 HC RX 258: Performed by: INTERNAL MEDICINE

## 2019-10-22 PROCEDURE — 6360000002 HC RX W HCPCS: Performed by: EMERGENCY MEDICINE

## 2019-10-22 PROCEDURE — 84145 PROCALCITONIN (PCT): CPT

## 2019-10-22 PROCEDURE — 94640 AIRWAY INHALATION TREATMENT: CPT

## 2019-10-22 PROCEDURE — 51702 INSERT TEMP BLADDER CATH: CPT

## 2019-10-22 PROCEDURE — 85025 COMPLETE CBC W/AUTO DIFF WBC: CPT

## 2019-10-22 PROCEDURE — 81003 URINALYSIS AUTO W/O SCOPE: CPT

## 2019-10-22 PROCEDURE — 87804 INFLUENZA ASSAY W/OPTIC: CPT

## 2019-10-22 PROCEDURE — 94761 N-INVAS EAR/PLS OXIMETRY MLT: CPT

## 2019-10-22 PROCEDURE — 87186 SC STD MICRODIL/AGAR DIL: CPT

## 2019-10-22 PROCEDURE — 99284 EMERGENCY DEPT VISIT MOD MDM: CPT

## 2019-10-22 PROCEDURE — 71045 X-RAY EXAM CHEST 1 VIEW: CPT

## 2019-10-22 PROCEDURE — 94669 MECHANICAL CHEST WALL OSCILL: CPT

## 2019-10-22 PROCEDURE — 1200000000 HC SEMI PRIVATE

## 2019-10-22 PROCEDURE — 87150 DNA/RNA AMPLIFIED PROBE: CPT

## 2019-10-22 PROCEDURE — 82803 BLOOD GASES ANY COMBINATION: CPT

## 2019-10-22 PROCEDURE — 6360000002 HC RX W HCPCS: Performed by: INTERNAL MEDICINE

## 2019-10-22 PROCEDURE — 84484 ASSAY OF TROPONIN QUANT: CPT

## 2019-10-22 PROCEDURE — 2580000003 HC RX 258: Performed by: EMERGENCY MEDICINE

## 2019-10-22 PROCEDURE — 80053 COMPREHEN METABOLIC PANEL: CPT

## 2019-10-22 PROCEDURE — 96366 THER/PROPH/DIAG IV INF ADDON: CPT

## 2019-10-22 PROCEDURE — 96365 THER/PROPH/DIAG IV INF INIT: CPT

## 2019-10-22 PROCEDURE — 83605 ASSAY OF LACTIC ACID: CPT

## 2019-10-22 PROCEDURE — 87040 BLOOD CULTURE FOR BACTERIA: CPT

## 2019-10-22 PROCEDURE — 96375 TX/PRO/DX INJ NEW DRUG ADDON: CPT

## 2019-10-22 PROCEDURE — 2700000000 HC OXYGEN THERAPY PER DAY

## 2019-10-22 PROCEDURE — 6370000000 HC RX 637 (ALT 250 FOR IP): Performed by: EMERGENCY MEDICINE

## 2019-10-22 RX ORDER — FLUTICASONE PROPIONATE 50 MCG
1 SPRAY, SUSPENSION (ML) NASAL DAILY
Status: DISCONTINUED | OUTPATIENT
Start: 2019-10-22 | End: 2019-10-27 | Stop reason: HOSPADM

## 2019-10-22 RX ORDER — TORSEMIDE 20 MG/1
40 TABLET ORAL SEE ADMIN INSTRUCTIONS
Status: ON HOLD | COMMUNITY
End: 2019-10-27 | Stop reason: HOSPADM

## 2019-10-22 RX ORDER — PANTOPRAZOLE SODIUM 40 MG/1
40 TABLET, DELAYED RELEASE ORAL
Status: DISCONTINUED | OUTPATIENT
Start: 2019-10-23 | End: 2019-10-27 | Stop reason: HOSPADM

## 2019-10-22 RX ORDER — OXYCODONE HYDROCHLORIDE 5 MG/1
5 TABLET ORAL EVERY 4 HOURS PRN
Status: DISCONTINUED | OUTPATIENT
Start: 2019-10-22 | End: 2019-10-23

## 2019-10-22 RX ORDER — METOCLOPRAMIDE 10 MG/1
5 TABLET ORAL 3 TIMES DAILY
Status: ON HOLD | COMMUNITY
End: 2019-10-27 | Stop reason: HOSPADM

## 2019-10-22 RX ORDER — METOLAZONE 5 MG/1
5 TABLET ORAL PRN
Status: ON HOLD | COMMUNITY
End: 2019-10-27 | Stop reason: HOSPADM

## 2019-10-22 RX ORDER — SODIUM CHLORIDE 0.9 % (FLUSH) 0.9 %
10 SYRINGE (ML) INJECTION PRN
Status: DISCONTINUED | OUTPATIENT
Start: 2019-10-22 | End: 2019-10-27 | Stop reason: HOSPADM

## 2019-10-22 RX ORDER — DEXTROSE MONOHYDRATE 50 MG/ML
100 INJECTION, SOLUTION INTRAVENOUS PRN
Status: DISCONTINUED | OUTPATIENT
Start: 2019-10-22 | End: 2019-10-27 | Stop reason: HOSPADM

## 2019-10-22 RX ORDER — CALCITRIOL 0.25 UG/1
0.25 CAPSULE, LIQUID FILLED ORAL DAILY
Status: DISCONTINUED | OUTPATIENT
Start: 2019-10-22 | End: 2019-10-23

## 2019-10-22 RX ORDER — LACTULOSE 10 G/15ML
20 SOLUTION ORAL; RECTAL 3 TIMES DAILY
Status: ON HOLD | COMMUNITY
End: 2019-10-22 | Stop reason: ALTCHOICE

## 2019-10-22 RX ORDER — NITROGLYCERIN 0.4 MG/1
0.4 TABLET SUBLINGUAL EVERY 5 MIN PRN
Status: DISCONTINUED | OUTPATIENT
Start: 2019-10-22 | End: 2019-10-27 | Stop reason: HOSPADM

## 2019-10-22 RX ORDER — IPRATROPIUM BROMIDE AND ALBUTEROL SULFATE 2.5; .5 MG/3ML; MG/3ML
1 SOLUTION RESPIRATORY (INHALATION)
Status: DISCONTINUED | OUTPATIENT
Start: 2019-10-22 | End: 2019-10-22

## 2019-10-22 RX ORDER — METOCLOPRAMIDE 10 MG/1
10 TABLET ORAL DAILY
Status: DISCONTINUED | OUTPATIENT
Start: 2019-10-22 | End: 2019-10-27 | Stop reason: HOSPADM

## 2019-10-22 RX ORDER — TORSEMIDE 20 MG/1
20 TABLET ORAL DAILY
Status: ON HOLD | COMMUNITY
End: 2019-10-23

## 2019-10-22 RX ORDER — ALLOPURINOL 300 MG/1
300 TABLET ORAL DAILY
Status: DISCONTINUED | OUTPATIENT
Start: 2019-10-22 | End: 2019-10-27 | Stop reason: HOSPADM

## 2019-10-22 RX ORDER — POLYETHYLENE GLYCOL 3350 17 G/17G
17 POWDER, FOR SOLUTION ORAL DAILY PRN
Status: DISCONTINUED | OUTPATIENT
Start: 2019-10-22 | End: 2019-10-27 | Stop reason: HOSPADM

## 2019-10-22 RX ORDER — M-VIT,TX,IRON,MINS/CALC/FOLIC 27MG-0.4MG
1 TABLET ORAL DAILY
Status: DISCONTINUED | OUTPATIENT
Start: 2019-10-22 | End: 2019-10-27 | Stop reason: HOSPADM

## 2019-10-22 RX ORDER — CALCITRIOL 0.25 UG/1
0.25 CAPSULE, LIQUID FILLED ORAL SEE ADMIN INSTRUCTIONS
COMMUNITY

## 2019-10-22 RX ORDER — ALBUTEROL SULFATE 90 UG/1
2 AEROSOL, METERED RESPIRATORY (INHALATION) EVERY 6 HOURS PRN
Status: DISCONTINUED | OUTPATIENT
Start: 2019-10-22 | End: 2019-10-22

## 2019-10-22 RX ORDER — PROCHLORPERAZINE EDISYLATE 5 MG/ML
10 INJECTION INTRAMUSCULAR; INTRAVENOUS EVERY 6 HOURS PRN
Status: DISCONTINUED | OUTPATIENT
Start: 2019-10-22 | End: 2019-10-27 | Stop reason: HOSPADM

## 2019-10-22 RX ORDER — 0.9 % SODIUM CHLORIDE 0.9 %
500 INTRAVENOUS SOLUTION INTRAVENOUS ONCE
Status: COMPLETED | OUTPATIENT
Start: 2019-10-22 | End: 2019-10-22

## 2019-10-22 RX ORDER — ATORVASTATIN CALCIUM 10 MG/1
20 TABLET, FILM COATED ORAL DAILY
Status: DISCONTINUED | OUTPATIENT
Start: 2019-10-22 | End: 2019-10-27 | Stop reason: HOSPADM

## 2019-10-22 RX ORDER — IPRATROPIUM BROMIDE AND ALBUTEROL SULFATE 2.5; .5 MG/3ML; MG/3ML
1 SOLUTION RESPIRATORY (INHALATION) EVERY 4 HOURS
Status: DISCONTINUED | OUTPATIENT
Start: 2019-10-22 | End: 2019-10-24

## 2019-10-22 RX ORDER — SPIRONOLACTONE 25 MG/1
25 TABLET ORAL DAILY
Status: DISCONTINUED | OUTPATIENT
Start: 2019-10-22 | End: 2019-10-27 | Stop reason: HOSPADM

## 2019-10-22 RX ORDER — FUROSEMIDE 10 MG/ML
40 INJECTION INTRAMUSCULAR; INTRAVENOUS ONCE
Status: COMPLETED | OUTPATIENT
Start: 2019-10-22 | End: 2019-10-22

## 2019-10-22 RX ORDER — FUROSEMIDE 10 MG/ML
40 INJECTION INTRAMUSCULAR; INTRAVENOUS 2 TIMES DAILY
Status: DISCONTINUED | OUTPATIENT
Start: 2019-10-22 | End: 2019-10-23

## 2019-10-22 RX ORDER — NICOTINE POLACRILEX 4 MG
15 LOZENGE BUCCAL PRN
Status: DISCONTINUED | OUTPATIENT
Start: 2019-10-22 | End: 2019-10-27 | Stop reason: HOSPADM

## 2019-10-22 RX ORDER — TRAZODONE HYDROCHLORIDE 100 MG/1
150 TABLET ORAL NIGHTLY
Status: ON HOLD | COMMUNITY
End: 2020-11-09 | Stop reason: HOSPADM

## 2019-10-22 RX ORDER — ALLOPURINOL 300 MG/1
300 TABLET ORAL DAILY
COMMUNITY

## 2019-10-22 RX ORDER — LACTULOSE 10 G/15ML
20 SOLUTION ORAL 3 TIMES DAILY
Status: DISCONTINUED | OUTPATIENT
Start: 2019-10-22 | End: 2019-10-22

## 2019-10-22 RX ORDER — METHYLPREDNISOLONE SODIUM SUCCINATE 40 MG/ML
40 INJECTION, POWDER, LYOPHILIZED, FOR SOLUTION INTRAMUSCULAR; INTRAVENOUS ONCE
Status: COMPLETED | OUTPATIENT
Start: 2019-10-22 | End: 2019-10-22

## 2019-10-22 RX ORDER — SODIUM CHLORIDE 0.9 % (FLUSH) 0.9 %
10 SYRINGE (ML) INJECTION EVERY 12 HOURS SCHEDULED
Status: DISCONTINUED | OUTPATIENT
Start: 2019-10-22 | End: 2019-10-27 | Stop reason: HOSPADM

## 2019-10-22 RX ORDER — ACETAMINOPHEN 325 MG/1
650 TABLET ORAL EVERY 4 HOURS PRN
Status: DISCONTINUED | OUTPATIENT
Start: 2019-10-22 | End: 2019-10-27 | Stop reason: HOSPADM

## 2019-10-22 RX ORDER — DEXTROSE MONOHYDRATE 25 G/50ML
12.5 INJECTION, SOLUTION INTRAVENOUS PRN
Status: DISCONTINUED | OUTPATIENT
Start: 2019-10-22 | End: 2019-10-27 | Stop reason: HOSPADM

## 2019-10-22 RX ORDER — METOLAZONE 2.5 MG/1
5 TABLET ORAL DAILY
Status: DISCONTINUED | OUTPATIENT
Start: 2019-10-22 | End: 2019-10-23

## 2019-10-22 RX ORDER — ACETAMINOPHEN 325 MG/1
650 TABLET ORAL ONCE
Status: COMPLETED | OUTPATIENT
Start: 2019-10-22 | End: 2019-10-22

## 2019-10-22 RX ORDER — RANOLAZINE 500 MG/1
500 TABLET, EXTENDED RELEASE ORAL 2 TIMES DAILY
Status: DISCONTINUED | OUTPATIENT
Start: 2019-10-22 | End: 2019-10-27 | Stop reason: HOSPADM

## 2019-10-22 RX ORDER — METHYLPREDNISOLONE SODIUM SUCCINATE 40 MG/ML
40 INJECTION, POWDER, LYOPHILIZED, FOR SOLUTION INTRAMUSCULAR; INTRAVENOUS EVERY 12 HOURS
Status: DISCONTINUED | OUTPATIENT
Start: 2019-10-23 | End: 2019-10-24

## 2019-10-22 RX ORDER — FERROUS SULFATE 325(65) MG
325 TABLET ORAL 2 TIMES DAILY WITH MEALS
Status: DISCONTINUED | OUTPATIENT
Start: 2019-10-22 | End: 2019-10-27 | Stop reason: HOSPADM

## 2019-10-22 RX ORDER — CARVEDILOL 6.25 MG/1
3.12 TABLET ORAL 2 TIMES DAILY
Status: DISCONTINUED | OUTPATIENT
Start: 2019-10-22 | End: 2019-10-27 | Stop reason: HOSPADM

## 2019-10-22 RX ORDER — OXYCODONE HYDROCHLORIDE 5 MG/1
5 TABLET ORAL EVERY 4 HOURS PRN
Status: ON HOLD | COMMUNITY
End: 2019-10-23

## 2019-10-22 RX ORDER — INSULIN GLARGINE 100 [IU]/ML
18 INJECTION, SOLUTION SUBCUTANEOUS NIGHTLY
Status: DISCONTINUED | OUTPATIENT
Start: 2019-10-22 | End: 2019-10-27 | Stop reason: HOSPADM

## 2019-10-22 RX ADMIN — NYSTATIN 500000 UNITS: 100000 SUSPENSION ORAL at 18:34

## 2019-10-22 RX ADMIN — METHYLPREDNISOLONE SODIUM SUCCINATE 40 MG: 40 INJECTION, POWDER, FOR SOLUTION INTRAMUSCULAR; INTRAVENOUS at 14:51

## 2019-10-22 RX ADMIN — SPIRONOLACTONE 25 MG: 25 TABLET ORAL at 19:18

## 2019-10-22 RX ADMIN — IPRATROPIUM BROMIDE AND ALBUTEROL SULFATE 3 ML: .5; 3 SOLUTION RESPIRATORY (INHALATION) at 20:16

## 2019-10-22 RX ADMIN — SODIUM CHLORIDE 500 ML: 9 INJECTION, SOLUTION INTRAVENOUS at 11:04

## 2019-10-22 RX ADMIN — CEFTRIAXONE SODIUM 1 G: 1 INJECTION, POWDER, FOR SOLUTION INTRAMUSCULAR; INTRAVENOUS at 12:09

## 2019-10-22 RX ADMIN — ACETAMINOPHEN 650 MG: 325 TABLET ORAL at 10:59

## 2019-10-22 RX ADMIN — Medication 1 TABLET: at 19:19

## 2019-10-22 RX ADMIN — INSULIN LISPRO 1 UNITS: 100 INJECTION, SOLUTION INTRAVENOUS; SUBCUTANEOUS at 18:49

## 2019-10-22 RX ADMIN — ALLOPURINOL 300 MG: 300 TABLET ORAL at 19:19

## 2019-10-22 RX ADMIN — APIXABAN 2.5 MG: 2.5 TABLET, FILM COATED ORAL at 21:39

## 2019-10-22 RX ADMIN — Medication 10 ML: at 22:00

## 2019-10-22 RX ADMIN — CARVEDILOL 3.12 MG: 6.25 TABLET, FILM COATED ORAL at 21:39

## 2019-10-22 RX ADMIN — METOLAZONE 5 MG: 2.5 TABLET ORAL at 19:18

## 2019-10-22 RX ADMIN — FUROSEMIDE 40 MG: 10 INJECTION, SOLUTION INTRAMUSCULAR; INTRAVENOUS at 13:14

## 2019-10-22 RX ADMIN — AZITHROMYCIN MONOHYDRATE 500 MG: 500 INJECTION, POWDER, LYOPHILIZED, FOR SOLUTION INTRAVENOUS at 13:17

## 2019-10-22 RX ADMIN — INSULIN LISPRO 2 UNITS: 100 INJECTION, SOLUTION INTRAVENOUS; SUBCUTANEOUS at 21:49

## 2019-10-22 RX ADMIN — IPRATROPIUM BROMIDE AND ALBUTEROL SULFATE 3 ML: .5; 3 SOLUTION RESPIRATORY (INHALATION) at 23:33

## 2019-10-22 RX ADMIN — RANOLAZINE 500 MG: 500 TABLET, FILM COATED, EXTENDED RELEASE ORAL at 21:39

## 2019-10-22 RX ADMIN — ATORVASTATIN CALCIUM 20 MG: 10 TABLET, FILM COATED ORAL at 19:19

## 2019-10-22 RX ADMIN — NYSTATIN 500000 UNITS: 100000 SUSPENSION ORAL at 21:39

## 2019-10-22 RX ADMIN — FUROSEMIDE 40 MG: 10 INJECTION, SOLUTION INTRAMUSCULAR; INTRAVENOUS at 18:34

## 2019-10-22 RX ADMIN — INSULIN GLARGINE 18 UNITS: 100 INJECTION, SOLUTION SUBCUTANEOUS at 21:49

## 2019-10-22 RX ADMIN — FERROUS SULFATE TAB 325 MG (65 MG ELEMENTAL FE) 325 MG: 325 (65 FE) TAB at 18:34

## 2019-10-22 RX ADMIN — CALCITRIOL 0.25 MCG: 0.25 CAPSULE ORAL at 18:19

## 2019-10-22 RX ADMIN — IPRATROPIUM BROMIDE AND ALBUTEROL SULFATE 3 ML: .5; 3 SOLUTION RESPIRATORY (INHALATION) at 17:01

## 2019-10-22 RX ADMIN — Medication 10 ML: at 18:38

## 2019-10-22 ASSESSMENT — PAIN DESCRIPTION - LOCATION: LOCATION: OTHER (COMMENT)

## 2019-10-22 ASSESSMENT — PAIN SCALES - GENERAL: PAINLEVEL_OUTOF10: 8

## 2019-10-22 ASSESSMENT — PAIN DESCRIPTION - ORIENTATION: ORIENTATION: RIGHT;LEFT

## 2019-10-22 ASSESSMENT — PAIN DESCRIPTION - FREQUENCY: FREQUENCY: INTERMITTENT

## 2019-10-22 ASSESSMENT — PAIN DESCRIPTION - DESCRIPTORS: DESCRIPTORS: SQUEEZING

## 2019-10-22 ASSESSMENT — PAIN DESCRIPTION - PAIN TYPE: TYPE: ACUTE PAIN

## 2019-10-23 LAB
ANION GAP SERPL CALCULATED.3IONS-SCNC: 15 MMOL/L (ref 3–16)
BUN BLDV-MCNC: 54 MG/DL (ref 7–20)
CALCIUM SERPL-MCNC: 10 MG/DL (ref 8.3–10.6)
CHLORIDE BLD-SCNC: 93 MMOL/L (ref 99–110)
CO2: 28 MMOL/L (ref 21–32)
CREAT SERPL-MCNC: 2.1 MG/DL (ref 0.8–1.3)
GFR AFRICAN AMERICAN: 37
GFR NON-AFRICAN AMERICAN: 30
GLUCOSE BLD-MCNC: 186 MG/DL (ref 70–99)
GLUCOSE BLD-MCNC: 210 MG/DL (ref 70–99)
GLUCOSE BLD-MCNC: 221 MG/DL (ref 70–99)
GLUCOSE BLD-MCNC: 227 MG/DL (ref 70–99)
GLUCOSE BLD-MCNC: 247 MG/DL (ref 70–99)
GLUCOSE BLD-MCNC: 372 MG/DL (ref 70–99)
HCT VFR BLD CALC: 29.5 % (ref 40.5–52.5)
HEMATOLOGY PATH CONSULT: NORMAL
HEMOGLOBIN: 9.8 G/DL (ref 13.5–17.5)
MAGNESIUM: 2.3 MG/DL (ref 1.8–2.4)
MCH RBC QN AUTO: 30.2 PG (ref 26–34)
MCHC RBC AUTO-ENTMCNC: 33.1 G/DL (ref 31–36)
MCV RBC AUTO: 91.2 FL (ref 80–100)
PDW BLD-RTO: 18.5 % (ref 12.4–15.4)
PERFORMED ON: ABNORMAL
PLATELET # BLD: 333 K/UL (ref 135–450)
PMV BLD AUTO: 7.9 FL (ref 5–10.5)
POTASSIUM SERPL-SCNC: 4.2 MMOL/L (ref 3.5–5.1)
RBC # BLD: 3.24 M/UL (ref 4.2–5.9)
REPORT: NORMAL
SODIUM BLD-SCNC: 136 MMOL/L (ref 136–145)
TROPONIN: 0.03 NG/ML
WBC # BLD: 14.2 K/UL (ref 4–11)

## 2019-10-23 PROCEDURE — 7100000011 HC PHASE II RECOVERY - ADDTL 15 MIN: Performed by: INTERNAL MEDICINE

## 2019-10-23 PROCEDURE — 87206 SMEAR FLUORESCENT/ACID STAI: CPT

## 2019-10-23 PROCEDURE — 94669 MECHANICAL CHEST WALL OSCILL: CPT

## 2019-10-23 PROCEDURE — 36415 COLL VENOUS BLD VENIPUNCTURE: CPT

## 2019-10-23 PROCEDURE — 84484 ASSAY OF TROPONIN QUANT: CPT

## 2019-10-23 PROCEDURE — 99152 MOD SED SAME PHYS/QHP 5/>YRS: CPT | Performed by: INTERNAL MEDICINE

## 2019-10-23 PROCEDURE — 6360000002 HC RX W HCPCS: Performed by: INTERNAL MEDICINE

## 2019-10-23 PROCEDURE — 2580000003 HC RX 258: Performed by: INTERNAL MEDICINE

## 2019-10-23 PROCEDURE — 96366 THER/PROPH/DIAG IV INF ADDON: CPT

## 2019-10-23 PROCEDURE — 0BC38ZZ EXTIRPATION OF MATTER FROM RIGHT MAIN BRONCHUS, VIA NATURAL OR ARTIFICIAL OPENING ENDOSCOPIC: ICD-10-PCS | Performed by: INTERNAL MEDICINE

## 2019-10-23 PROCEDURE — 0BC28ZZ EXTIRPATION OF MATTER FROM CARINA, VIA NATURAL OR ARTIFICIAL OPENING ENDOSCOPIC: ICD-10-PCS | Performed by: INTERNAL MEDICINE

## 2019-10-23 PROCEDURE — 3609027000 HC BRONCHOSCOPY: Performed by: INTERNAL MEDICINE

## 2019-10-23 PROCEDURE — 6370000000 HC RX 637 (ALT 250 FOR IP): Performed by: INTERNAL MEDICINE

## 2019-10-23 PROCEDURE — 88305 TISSUE EXAM BY PATHOLOGIST: CPT

## 2019-10-23 PROCEDURE — 94761 N-INVAS EAR/PLS OXIMETRY MLT: CPT

## 2019-10-23 PROCEDURE — 80048 BASIC METABOLIC PNL TOTAL CA: CPT

## 2019-10-23 PROCEDURE — 1200000000 HC SEMI PRIVATE

## 2019-10-23 PROCEDURE — 88112 CYTOPATH CELL ENHANCE TECH: CPT

## 2019-10-23 PROCEDURE — 7100000010 HC PHASE II RECOVERY - FIRST 15 MIN: Performed by: INTERNAL MEDICINE

## 2019-10-23 PROCEDURE — 2700000000 HC OXYGEN THERAPY PER DAY

## 2019-10-23 PROCEDURE — 99232 SBSQ HOSP IP/OBS MODERATE 35: CPT | Performed by: INTERNAL MEDICINE

## 2019-10-23 PROCEDURE — 83735 ASSAY OF MAGNESIUM: CPT

## 2019-10-23 PROCEDURE — 31622 DX BRONCHOSCOPE/WASH: CPT | Performed by: INTERNAL MEDICINE

## 2019-10-23 PROCEDURE — 96367 TX/PROPH/DG ADDL SEQ IV INF: CPT

## 2019-10-23 PROCEDURE — 2500000003 HC RX 250 WO HCPCS: Performed by: NURSE PRACTITIONER

## 2019-10-23 PROCEDURE — 85027 COMPLETE CBC AUTOMATED: CPT

## 2019-10-23 PROCEDURE — 0BC78ZZ EXTIRPATION OF MATTER FROM LEFT MAIN BRONCHUS, VIA NATURAL OR ARTIFICIAL OPENING ENDOSCOPIC: ICD-10-PCS | Performed by: INTERNAL MEDICINE

## 2019-10-23 PROCEDURE — 87116 MYCOBACTERIA CULTURE: CPT

## 2019-10-23 PROCEDURE — 94640 AIRWAY INHALATION TREATMENT: CPT

## 2019-10-23 PROCEDURE — 0BC18ZZ EXTIRPATION OF MATTER FROM TRACHEA, VIA NATURAL OR ARTIFICIAL OPENING ENDOSCOPIC: ICD-10-PCS | Performed by: INTERNAL MEDICINE

## 2019-10-23 PROCEDURE — 2709999900 HC NON-CHARGEABLE SUPPLY: Performed by: INTERNAL MEDICINE

## 2019-10-23 PROCEDURE — 87015 SPECIMEN INFECT AGNT CONCNTJ: CPT

## 2019-10-23 RX ORDER — POTASSIUM CHLORIDE 20 MEQ/1
10 TABLET, EXTENDED RELEASE ORAL DAILY
COMMUNITY

## 2019-10-23 RX ORDER — TORSEMIDE 20 MG/1
20 TABLET ORAL DAILY
Status: DISCONTINUED | OUTPATIENT
Start: 2019-10-24 | End: 2019-10-27 | Stop reason: HOSPADM

## 2019-10-23 RX ORDER — SODIUM CHLORIDE 9 MG/ML
INJECTION, SOLUTION INTRAVENOUS
Status: DISPENSED
Start: 2019-10-23 | End: 2019-10-23

## 2019-10-23 RX ORDER — SODIUM CHLORIDE 9 MG/ML
INJECTION, SOLUTION INTRAVENOUS
Status: DISPENSED
Start: 2019-10-23 | End: 2019-10-24

## 2019-10-23 RX ORDER — OXYCODONE HYDROCHLORIDE 5 MG/1
10 TABLET ORAL EVERY 4 HOURS PRN
Status: DISCONTINUED | OUTPATIENT
Start: 2019-10-23 | End: 2019-10-27 | Stop reason: HOSPADM

## 2019-10-23 RX ORDER — MIDAZOLAM HYDROCHLORIDE 5 MG/ML
INJECTION INTRAMUSCULAR; INTRAVENOUS PRN
Status: DISCONTINUED | OUTPATIENT
Start: 2019-10-23 | End: 2019-10-23 | Stop reason: ALTCHOICE

## 2019-10-23 RX ORDER — BENZONATATE 100 MG/1
100 CAPSULE ORAL 3 TIMES DAILY PRN
COMMUNITY

## 2019-10-23 RX ORDER — OXYCODONE HYDROCHLORIDE 10 MG/1
10 TABLET ORAL EVERY 4 HOURS PRN
Status: ON HOLD | COMMUNITY
End: 2019-10-27 | Stop reason: HOSPADM

## 2019-10-23 RX ORDER — FENTANYL CITRATE 50 UG/ML
INJECTION, SOLUTION INTRAMUSCULAR; INTRAVENOUS PRN
Status: DISCONTINUED | OUTPATIENT
Start: 2019-10-23 | End: 2019-10-23 | Stop reason: ALTCHOICE

## 2019-10-23 RX ORDER — CALCITRIOL 0.25 UG/1
0.25 CAPSULE, LIQUID FILLED ORAL
Status: DISCONTINUED | OUTPATIENT
Start: 2019-10-25 | End: 2019-10-27 | Stop reason: HOSPADM

## 2019-10-23 RX ADMIN — Medication 1 TABLET: at 15:24

## 2019-10-23 RX ADMIN — METHYLPREDNISOLONE SODIUM SUCCINATE 40 MG: 40 INJECTION, POWDER, FOR SOLUTION INTRAMUSCULAR; INTRAVENOUS at 17:48

## 2019-10-23 RX ADMIN — METHYLPREDNISOLONE SODIUM SUCCINATE 40 MG: 40 INJECTION, POWDER, FOR SOLUTION INTRAMUSCULAR; INTRAVENOUS at 06:22

## 2019-10-23 RX ADMIN — INSULIN LISPRO 4 UNITS: 100 INJECTION, SOLUTION INTRAVENOUS; SUBCUTANEOUS at 11:44

## 2019-10-23 RX ADMIN — FUROSEMIDE 40 MG: 10 INJECTION, SOLUTION INTRAMUSCULAR; INTRAVENOUS at 09:32

## 2019-10-23 RX ADMIN — APIXABAN 2.5 MG: 2.5 TABLET, FILM COATED ORAL at 15:24

## 2019-10-23 RX ADMIN — ALLOPURINOL 300 MG: 300 TABLET ORAL at 15:23

## 2019-10-23 RX ADMIN — CEFTRIAXONE SODIUM 1 G: 1 INJECTION, POWDER, FOR SOLUTION INTRAMUSCULAR; INTRAVENOUS at 16:43

## 2019-10-23 RX ADMIN — INSULIN LISPRO 2 UNITS: 100 INJECTION, SOLUTION INTRAVENOUS; SUBCUTANEOUS at 08:21

## 2019-10-23 RX ADMIN — NYSTATIN 500000 UNITS: 100000 SUSPENSION ORAL at 15:24

## 2019-10-23 RX ADMIN — INSULIN LISPRO 2 UNITS: 100 INJECTION, SOLUTION INTRAVENOUS; SUBCUTANEOUS at 11:44

## 2019-10-23 RX ADMIN — ATORVASTATIN CALCIUM 20 MG: 10 TABLET, FILM COATED ORAL at 15:23

## 2019-10-23 RX ADMIN — CALCITRIOL 0.25 MCG: 0.25 CAPSULE ORAL at 15:39

## 2019-10-23 RX ADMIN — CARVEDILOL 3.12 MG: 6.25 TABLET, FILM COATED ORAL at 09:33

## 2019-10-23 RX ADMIN — INSULIN LISPRO 2 UNITS: 100 INJECTION, SOLUTION INTRAVENOUS; SUBCUTANEOUS at 17:39

## 2019-10-23 RX ADMIN — METOCLOPRAMIDE HYDROCHLORIDE 10 MG: 10 TABLET ORAL at 15:24

## 2019-10-23 RX ADMIN — INSULIN GLARGINE 18 UNITS: 100 INJECTION, SOLUTION SUBCUTANEOUS at 21:19

## 2019-10-23 RX ADMIN — INSULIN LISPRO 4 UNITS: 100 INJECTION, SOLUTION INTRAVENOUS; SUBCUTANEOUS at 17:39

## 2019-10-23 RX ADMIN — METOLAZONE 5 MG: 2.5 TABLET ORAL at 15:24

## 2019-10-23 RX ADMIN — SPIRONOLACTONE 25 MG: 25 TABLET ORAL at 09:33

## 2019-10-23 RX ADMIN — NYSTATIN 500000 UNITS: 100000 SUSPENSION ORAL at 20:22

## 2019-10-23 RX ADMIN — RANOLAZINE 500 MG: 500 TABLET, FILM COATED, EXTENDED RELEASE ORAL at 20:21

## 2019-10-23 RX ADMIN — Medication 10 ML: at 09:33

## 2019-10-23 RX ADMIN — ACETAMINOPHEN 650 MG: 325 TABLET ORAL at 09:48

## 2019-10-23 RX ADMIN — INSULIN LISPRO 4 UNITS: 100 INJECTION, SOLUTION INTRAVENOUS; SUBCUTANEOUS at 08:22

## 2019-10-23 RX ADMIN — RANOLAZINE 500 MG: 500 TABLET, FILM COATED, EXTENDED RELEASE ORAL at 15:23

## 2019-10-23 RX ADMIN — FLUTICASONE PROPIONATE 1 SPRAY: 50 SPRAY, METERED NASAL at 15:25

## 2019-10-23 RX ADMIN — MICONAZOLE NITRATE: 20 POWDER TOPICAL at 21:20

## 2019-10-23 RX ADMIN — IPRATROPIUM BROMIDE AND ALBUTEROL SULFATE 3 ML: .5; 3 SOLUTION RESPIRATORY (INHALATION) at 04:15

## 2019-10-23 RX ADMIN — IPRATROPIUM BROMIDE AND ALBUTEROL SULFATE 3 ML: .5; 3 SOLUTION RESPIRATORY (INHALATION) at 11:50

## 2019-10-23 RX ADMIN — IPRATROPIUM BROMIDE AND ALBUTEROL SULFATE 3 ML: .5; 3 SOLUTION RESPIRATORY (INHALATION) at 15:38

## 2019-10-23 RX ADMIN — APIXABAN 2.5 MG: 2.5 TABLET, FILM COATED ORAL at 20:21

## 2019-10-23 RX ADMIN — FERROUS SULFATE TAB 325 MG (65 MG ELEMENTAL FE) 325 MG: 325 (65 FE) TAB at 15:23

## 2019-10-23 RX ADMIN — IPRATROPIUM BROMIDE AND ALBUTEROL SULFATE 3 ML: .5; 3 SOLUTION RESPIRATORY (INHALATION) at 08:19

## 2019-10-23 RX ADMIN — IPRATROPIUM BROMIDE AND ALBUTEROL SULFATE 3 ML: .5; 3 SOLUTION RESPIRATORY (INHALATION) at 20:25

## 2019-10-23 RX ADMIN — Medication 10 ML: at 20:21

## 2019-10-23 RX ADMIN — DEXTROSE MONOHYDRATE 100 ML/HR: 5 INJECTION INTRAVENOUS at 09:32

## 2019-10-23 RX ADMIN — INSULIN LISPRO 3 UNITS: 100 INJECTION, SOLUTION INTRAVENOUS; SUBCUTANEOUS at 21:19

## 2019-10-23 RX ADMIN — CARVEDILOL 3.12 MG: 6.25 TABLET, FILM COATED ORAL at 20:21

## 2019-10-23 RX ADMIN — OXYCODONE HYDROCHLORIDE 5 MG: 5 TABLET ORAL at 04:27

## 2019-10-23 RX ADMIN — CEFTRIAXONE SODIUM 1 G: 1 INJECTION, POWDER, FOR SOLUTION INTRAMUSCULAR; INTRAVENOUS at 09:32

## 2019-10-23 ASSESSMENT — PAIN SCALES - GENERAL
PAINLEVEL_OUTOF10: 6
PAINLEVEL_OUTOF10: 4
PAINLEVEL_OUTOF10: 0
PAINLEVEL_OUTOF10: 0
PAINLEVEL_OUTOF10: 4
PAINLEVEL_OUTOF10: 0

## 2019-10-23 ASSESSMENT — PAIN DESCRIPTION - LOCATION: LOCATION: GENERALIZED

## 2019-10-24 PROBLEM — I51.89 DIASTOLIC DYSFUNCTION: Status: ACTIVE | Noted: 2019-10-24

## 2019-10-24 PROBLEM — D72.829 LEUKOCYTOSIS: Status: ACTIVE | Noted: 2019-10-24

## 2019-10-24 PROBLEM — R78.81 GRAM-NEGATIVE BACTEREMIA: Status: ACTIVE | Noted: 2019-10-24

## 2019-10-24 PROBLEM — I42.9 CARDIOMYOPATHY (HCC): Status: ACTIVE | Noted: 2019-10-24

## 2019-10-24 LAB
ANION GAP SERPL CALCULATED.3IONS-SCNC: 15 MMOL/L (ref 3–16)
BUN BLDV-MCNC: 55 MG/DL (ref 7–20)
CALCIUM SERPL-MCNC: 9 MG/DL (ref 8.3–10.6)
CHLORIDE BLD-SCNC: 95 MMOL/L (ref 99–110)
CO2: 28 MMOL/L (ref 21–32)
CREAT SERPL-MCNC: 1.8 MG/DL (ref 0.8–1.3)
GFR AFRICAN AMERICAN: 44
GFR NON-AFRICAN AMERICAN: 36
GLUCOSE BLD-MCNC: 190 MG/DL (ref 70–99)
GLUCOSE BLD-MCNC: 193 MG/DL (ref 70–99)
GLUCOSE BLD-MCNC: 307 MG/DL (ref 70–99)
GLUCOSE BLD-MCNC: 351 MG/DL (ref 70–99)
GLUCOSE BLD-MCNC: 352 MG/DL (ref 70–99)
HCT VFR BLD CALC: 29.8 % (ref 40.5–52.5)
HEMOGLOBIN: 9.8 G/DL (ref 13.5–17.5)
MCH RBC QN AUTO: 30 PG (ref 26–34)
MCHC RBC AUTO-ENTMCNC: 32.8 G/DL (ref 31–36)
MCV RBC AUTO: 91.6 FL (ref 80–100)
PDW BLD-RTO: 18.4 % (ref 12.4–15.4)
PERFORMED ON: ABNORMAL
PLATELET # BLD: 358 K/UL (ref 135–450)
PMV BLD AUTO: 7.9 FL (ref 5–10.5)
POTASSIUM REFLEX MAGNESIUM: 4 MMOL/L (ref 3.5–5.1)
RBC # BLD: 3.26 M/UL (ref 4.2–5.9)
SODIUM BLD-SCNC: 138 MMOL/L (ref 136–145)
WBC # BLD: 16.3 K/UL (ref 4–11)

## 2019-10-24 PROCEDURE — 1200000000 HC SEMI PRIVATE

## 2019-10-24 PROCEDURE — 6370000000 HC RX 637 (ALT 250 FOR IP): Performed by: INTERNAL MEDICINE

## 2019-10-24 PROCEDURE — 97530 THERAPEUTIC ACTIVITIES: CPT

## 2019-10-24 PROCEDURE — 6360000002 HC RX W HCPCS: Performed by: INTERNAL MEDICINE

## 2019-10-24 PROCEDURE — 99233 SBSQ HOSP IP/OBS HIGH 50: CPT | Performed by: INTERNAL MEDICINE

## 2019-10-24 PROCEDURE — 80048 BASIC METABOLIC PNL TOTAL CA: CPT

## 2019-10-24 PROCEDURE — 94761 N-INVAS EAR/PLS OXIMETRY MLT: CPT

## 2019-10-24 PROCEDURE — 2700000000 HC OXYGEN THERAPY PER DAY

## 2019-10-24 PROCEDURE — 97110 THERAPEUTIC EXERCISES: CPT

## 2019-10-24 PROCEDURE — 85027 COMPLETE CBC AUTOMATED: CPT

## 2019-10-24 PROCEDURE — 92526 ORAL FUNCTION THERAPY: CPT

## 2019-10-24 PROCEDURE — 94669 MECHANICAL CHEST WALL OSCILL: CPT

## 2019-10-24 PROCEDURE — 94640 AIRWAY INHALATION TREATMENT: CPT

## 2019-10-24 PROCEDURE — 97116 GAIT TRAINING THERAPY: CPT

## 2019-10-24 PROCEDURE — 97166 OT EVAL MOD COMPLEX 45 MIN: CPT

## 2019-10-24 PROCEDURE — 92610 EVALUATE SWALLOWING FUNCTION: CPT

## 2019-10-24 PROCEDURE — 2580000003 HC RX 258: Performed by: INTERNAL MEDICINE

## 2019-10-24 PROCEDURE — 97162 PT EVAL MOD COMPLEX 30 MIN: CPT

## 2019-10-24 RX ORDER — IPRATROPIUM BROMIDE AND ALBUTEROL SULFATE 2.5; .5 MG/3ML; MG/3ML
1 SOLUTION RESPIRATORY (INHALATION) 2 TIMES DAILY
Status: DISCONTINUED | OUTPATIENT
Start: 2019-10-25 | End: 2019-10-27 | Stop reason: HOSPADM

## 2019-10-24 RX ORDER — PREDNISONE 20 MG/1
20 TABLET ORAL DAILY
Status: DISCONTINUED | OUTPATIENT
Start: 2019-10-25 | End: 2019-10-27 | Stop reason: HOSPADM

## 2019-10-24 RX ORDER — IPRATROPIUM BROMIDE AND ALBUTEROL SULFATE 2.5; .5 MG/3ML; MG/3ML
1 SOLUTION RESPIRATORY (INHALATION) EVERY 4 HOURS
Status: DISCONTINUED | OUTPATIENT
Start: 2019-10-24 | End: 2019-10-24

## 2019-10-24 RX ADMIN — INSULIN LISPRO 4 UNITS: 100 INJECTION, SOLUTION INTRAVENOUS; SUBCUTANEOUS at 10:38

## 2019-10-24 RX ADMIN — NYSTATIN 500000 UNITS: 100000 SUSPENSION ORAL at 10:38

## 2019-10-24 RX ADMIN — RANOLAZINE 500 MG: 500 TABLET, FILM COATED, EXTENDED RELEASE ORAL at 10:33

## 2019-10-24 RX ADMIN — METHYLPREDNISOLONE SODIUM SUCCINATE 40 MG: 40 INJECTION, POWDER, FOR SOLUTION INTRAMUSCULAR; INTRAVENOUS at 06:34

## 2019-10-24 RX ADMIN — FERROUS SULFATE TAB 325 MG (65 MG ELEMENTAL FE) 325 MG: 325 (65 FE) TAB at 17:55

## 2019-10-24 RX ADMIN — IPRATROPIUM BROMIDE AND ALBUTEROL SULFATE 3 ML: .5; 3 SOLUTION RESPIRATORY (INHALATION) at 00:20

## 2019-10-24 RX ADMIN — NYSTATIN 500000 UNITS: 100000 SUSPENSION ORAL at 23:08

## 2019-10-24 RX ADMIN — PIPERACILLIN AND TAZOBACTAM 3.38 G: 3; .375 INJECTION, POWDER, LYOPHILIZED, FOR SOLUTION INTRAVENOUS at 13:10

## 2019-10-24 RX ADMIN — INSULIN LISPRO 4 UNITS: 100 INJECTION, SOLUTION INTRAVENOUS; SUBCUTANEOUS at 18:05

## 2019-10-24 RX ADMIN — ATORVASTATIN CALCIUM 20 MG: 10 TABLET, FILM COATED ORAL at 10:34

## 2019-10-24 RX ADMIN — NYSTATIN 500000 UNITS: 100000 SUSPENSION ORAL at 13:30

## 2019-10-24 RX ADMIN — NYSTATIN 500000 UNITS: 100000 SUSPENSION ORAL at 18:04

## 2019-10-24 RX ADMIN — IPRATROPIUM BROMIDE AND ALBUTEROL SULFATE 3 ML: .5; 3 SOLUTION RESPIRATORY (INHALATION) at 12:33

## 2019-10-24 RX ADMIN — RANOLAZINE 500 MG: 500 TABLET, FILM COATED, EXTENDED RELEASE ORAL at 20:47

## 2019-10-24 RX ADMIN — INSULIN LISPRO 4 UNITS: 100 INJECTION, SOLUTION INTRAVENOUS; SUBCUTANEOUS at 13:30

## 2019-10-24 RX ADMIN — SPIRONOLACTONE 25 MG: 25 TABLET ORAL at 10:34

## 2019-10-24 RX ADMIN — METOCLOPRAMIDE HYDROCHLORIDE 10 MG: 10 TABLET ORAL at 10:34

## 2019-10-24 RX ADMIN — INSULIN GLARGINE 18 UNITS: 100 INJECTION, SOLUTION SUBCUTANEOUS at 20:47

## 2019-10-24 RX ADMIN — ALLOPURINOL 300 MG: 300 TABLET ORAL at 10:35

## 2019-10-24 RX ADMIN — MICONAZOLE NITRATE: 20 POWDER TOPICAL at 10:35

## 2019-10-24 RX ADMIN — Medication 1 TABLET: at 10:34

## 2019-10-24 RX ADMIN — IPRATROPIUM BROMIDE AND ALBUTEROL SULFATE 3 ML: .5; 3 SOLUTION RESPIRATORY (INHALATION) at 04:38

## 2019-10-24 RX ADMIN — FERROUS SULFATE TAB 325 MG (65 MG ELEMENTAL FE) 325 MG: 325 (65 FE) TAB at 10:33

## 2019-10-24 RX ADMIN — Medication 10 ML: at 10:38

## 2019-10-24 RX ADMIN — CARVEDILOL 3.12 MG: 6.25 TABLET, FILM COATED ORAL at 10:33

## 2019-10-24 RX ADMIN — IPRATROPIUM BROMIDE AND ALBUTEROL SULFATE 3 ML: .5; 3 SOLUTION RESPIRATORY (INHALATION) at 16:25

## 2019-10-24 RX ADMIN — TORSEMIDE 20 MG: 20 TABLET ORAL at 10:34

## 2019-10-24 RX ADMIN — PIPERACILLIN AND TAZOBACTAM 3.38 G: 3; .375 INJECTION, POWDER, LYOPHILIZED, FOR SOLUTION INTRAVENOUS at 19:22

## 2019-10-24 RX ADMIN — INSULIN LISPRO 3 UNITS: 100 INJECTION, SOLUTION INTRAVENOUS; SUBCUTANEOUS at 20:47

## 2019-10-24 RX ADMIN — INSULIN LISPRO 1 UNITS: 100 INJECTION, SOLUTION INTRAVENOUS; SUBCUTANEOUS at 10:37

## 2019-10-24 RX ADMIN — ACETAMINOPHEN 650 MG: 325 TABLET ORAL at 13:24

## 2019-10-24 RX ADMIN — APIXABAN 2.5 MG: 2.5 TABLET, FILM COATED ORAL at 20:47

## 2019-10-24 RX ADMIN — MICONAZOLE NITRATE: 20 POWDER TOPICAL at 21:00

## 2019-10-24 RX ADMIN — PANTOPRAZOLE SODIUM 40 MG: 40 TABLET, DELAYED RELEASE ORAL at 06:34

## 2019-10-24 RX ADMIN — IPRATROPIUM BROMIDE AND ALBUTEROL SULFATE 3 ML: .5; 3 SOLUTION RESPIRATORY (INHALATION) at 08:40

## 2019-10-24 RX ADMIN — APIXABAN 2.5 MG: 2.5 TABLET, FILM COATED ORAL at 10:34

## 2019-10-24 RX ADMIN — IPRATROPIUM BROMIDE AND ALBUTEROL SULFATE 3 ML: .5; 3 SOLUTION RESPIRATORY (INHALATION) at 19:58

## 2019-10-24 RX ADMIN — CARVEDILOL 3.12 MG: 6.25 TABLET, FILM COATED ORAL at 20:47

## 2019-10-24 RX ADMIN — INSULIN LISPRO 5 UNITS: 100 INJECTION, SOLUTION INTRAVENOUS; SUBCUTANEOUS at 18:04

## 2019-10-24 ASSESSMENT — PAIN SCALES - GENERAL: PAINLEVEL_OUTOF10: 5

## 2019-10-25 LAB
GLUCOSE BLD-MCNC: 159 MG/DL (ref 70–99)
GLUCOSE BLD-MCNC: 172 MG/DL (ref 70–99)
GLUCOSE BLD-MCNC: 199 MG/DL (ref 70–99)
GLUCOSE BLD-MCNC: 302 MG/DL (ref 70–99)
PERFORMED ON: ABNORMAL

## 2019-10-25 PROCEDURE — 2700000000 HC OXYGEN THERAPY PER DAY

## 2019-10-25 PROCEDURE — 2580000003 HC RX 258: Performed by: INTERNAL MEDICINE

## 2019-10-25 PROCEDURE — 6370000000 HC RX 637 (ALT 250 FOR IP): Performed by: INTERNAL MEDICINE

## 2019-10-25 PROCEDURE — 1200000000 HC SEMI PRIVATE

## 2019-10-25 PROCEDURE — 94669 MECHANICAL CHEST WALL OSCILL: CPT

## 2019-10-25 PROCEDURE — 6360000002 HC RX W HCPCS: Performed by: INTERNAL MEDICINE

## 2019-10-25 PROCEDURE — 97535 SELF CARE MNGMENT TRAINING: CPT

## 2019-10-25 PROCEDURE — 99232 SBSQ HOSP IP/OBS MODERATE 35: CPT | Performed by: INTERNAL MEDICINE

## 2019-10-25 PROCEDURE — 94761 N-INVAS EAR/PLS OXIMETRY MLT: CPT

## 2019-10-25 PROCEDURE — 94640 AIRWAY INHALATION TREATMENT: CPT

## 2019-10-25 RX ADMIN — IPRATROPIUM BROMIDE AND ALBUTEROL SULFATE 3 ML: .5; 3 SOLUTION RESPIRATORY (INHALATION) at 20:46

## 2019-10-25 RX ADMIN — FERROUS SULFATE TAB 325 MG (65 MG ELEMENTAL FE) 325 MG: 325 (65 FE) TAB at 10:33

## 2019-10-25 RX ADMIN — Medication 1 TABLET: at 10:34

## 2019-10-25 RX ADMIN — PIPERACILLIN AND TAZOBACTAM 3.38 G: 3; .375 INJECTION, POWDER, LYOPHILIZED, FOR SOLUTION INTRAVENOUS at 02:53

## 2019-10-25 RX ADMIN — NYSTATIN 500000 UNITS: 100000 SUSPENSION ORAL at 10:35

## 2019-10-25 RX ADMIN — ALLOPURINOL 300 MG: 300 TABLET ORAL at 10:33

## 2019-10-25 RX ADMIN — PIPERACILLIN AND TAZOBACTAM 3.38 G: 3; .375 INJECTION, POWDER, LYOPHILIZED, FOR SOLUTION INTRAVENOUS at 18:45

## 2019-10-25 RX ADMIN — PANTOPRAZOLE SODIUM 40 MG: 40 TABLET, DELAYED RELEASE ORAL at 06:38

## 2019-10-25 RX ADMIN — TORSEMIDE 20 MG: 20 TABLET ORAL at 10:34

## 2019-10-25 RX ADMIN — FERROUS SULFATE TAB 325 MG (65 MG ELEMENTAL FE) 325 MG: 325 (65 FE) TAB at 18:39

## 2019-10-25 RX ADMIN — Medication 10 ML: at 10:42

## 2019-10-25 RX ADMIN — OXYCODONE HYDROCHLORIDE 10 MG: 5 TABLET ORAL at 21:25

## 2019-10-25 RX ADMIN — ATORVASTATIN CALCIUM 20 MG: 10 TABLET, FILM COATED ORAL at 10:33

## 2019-10-25 RX ADMIN — INSULIN LISPRO 1 UNITS: 100 INJECTION, SOLUTION INTRAVENOUS; SUBCUTANEOUS at 20:55

## 2019-10-25 RX ADMIN — INSULIN GLARGINE 18 UNITS: 100 INJECTION, SOLUTION SUBCUTANEOUS at 20:54

## 2019-10-25 RX ADMIN — MICONAZOLE NITRATE: 20 POWDER TOPICAL at 10:35

## 2019-10-25 RX ADMIN — APIXABAN 2.5 MG: 2.5 TABLET, FILM COATED ORAL at 10:34

## 2019-10-25 RX ADMIN — PIPERACILLIN AND TAZOBACTAM 3.38 G: 3; .375 INJECTION, POWDER, LYOPHILIZED, FOR SOLUTION INTRAVENOUS at 10:34

## 2019-10-25 RX ADMIN — INSULIN LISPRO 4 UNITS: 100 INJECTION, SOLUTION INTRAVENOUS; SUBCUTANEOUS at 08:59

## 2019-10-25 RX ADMIN — INSULIN LISPRO 4 UNITS: 100 INJECTION, SOLUTION INTRAVENOUS; SUBCUTANEOUS at 18:34

## 2019-10-25 RX ADMIN — CARVEDILOL 3.12 MG: 6.25 TABLET, FILM COATED ORAL at 20:54

## 2019-10-25 RX ADMIN — APIXABAN 2.5 MG: 2.5 TABLET, FILM COATED ORAL at 20:54

## 2019-10-25 RX ADMIN — OXYCODONE HYDROCHLORIDE 10 MG: 5 TABLET ORAL at 10:34

## 2019-10-25 RX ADMIN — SPIRONOLACTONE 25 MG: 25 TABLET ORAL at 10:34

## 2019-10-25 RX ADMIN — INSULIN LISPRO 4 UNITS: 100 INJECTION, SOLUTION INTRAVENOUS; SUBCUTANEOUS at 13:03

## 2019-10-25 RX ADMIN — NYSTATIN 500000 UNITS: 100000 SUSPENSION ORAL at 20:53

## 2019-10-25 RX ADMIN — METOCLOPRAMIDE HYDROCHLORIDE 10 MG: 10 TABLET ORAL at 10:33

## 2019-10-25 RX ADMIN — PREDNISONE 20 MG: 20 TABLET ORAL at 10:34

## 2019-10-25 RX ADMIN — CALCITRIOL 0.25 MCG: 0.25 CAPSULE ORAL at 10:41

## 2019-10-25 RX ADMIN — FLUTICASONE PROPIONATE 1 SPRAY: 50 SPRAY, METERED NASAL at 10:35

## 2019-10-25 RX ADMIN — RANOLAZINE 500 MG: 500 TABLET, FILM COATED, EXTENDED RELEASE ORAL at 20:54

## 2019-10-25 RX ADMIN — CARVEDILOL 3.12 MG: 6.25 TABLET, FILM COATED ORAL at 10:33

## 2019-10-25 RX ADMIN — Medication 10 ML: at 20:55

## 2019-10-25 RX ADMIN — NYSTATIN 500000 UNITS: 100000 SUSPENSION ORAL at 18:39

## 2019-10-25 RX ADMIN — IPRATROPIUM BROMIDE AND ALBUTEROL SULFATE 3 ML: .5; 3 SOLUTION RESPIRATORY (INHALATION) at 07:50

## 2019-10-25 RX ADMIN — INSULIN LISPRO 1 UNITS: 100 INJECTION, SOLUTION INTRAVENOUS; SUBCUTANEOUS at 08:59

## 2019-10-25 RX ADMIN — MICONAZOLE NITRATE: 20 POWDER TOPICAL at 21:16

## 2019-10-25 RX ADMIN — INSULIN LISPRO 1 UNITS: 100 INJECTION, SOLUTION INTRAVENOUS; SUBCUTANEOUS at 18:34

## 2019-10-25 RX ADMIN — OXYCODONE HYDROCHLORIDE 10 MG: 5 TABLET ORAL at 14:49

## 2019-10-25 RX ADMIN — NYSTATIN 500000 UNITS: 100000 SUSPENSION ORAL at 14:49

## 2019-10-25 RX ADMIN — INSULIN LISPRO 4 UNITS: 100 INJECTION, SOLUTION INTRAVENOUS; SUBCUTANEOUS at 13:04

## 2019-10-25 RX ADMIN — RANOLAZINE 500 MG: 500 TABLET, FILM COATED, EXTENDED RELEASE ORAL at 10:33

## 2019-10-25 ASSESSMENT — PAIN DESCRIPTION - ORIENTATION: ORIENTATION: MID

## 2019-10-25 ASSESSMENT — PAIN DESCRIPTION - LOCATION: LOCATION: BACK

## 2019-10-25 ASSESSMENT — PAIN DESCRIPTION - FREQUENCY: FREQUENCY: INTERMITTENT

## 2019-10-25 ASSESSMENT — PAIN DESCRIPTION - DESCRIPTORS: DESCRIPTORS: ACHING;SORE

## 2019-10-25 ASSESSMENT — PAIN SCALES - GENERAL
PAINLEVEL_OUTOF10: 6
PAINLEVEL_OUTOF10: 10
PAINLEVEL_OUTOF10: 10
PAINLEVEL_OUTOF10: 8

## 2019-10-25 ASSESSMENT — PAIN DESCRIPTION - PAIN TYPE: TYPE: ACUTE PAIN;CHRONIC PAIN

## 2019-10-25 ASSESSMENT — PAIN DESCRIPTION - ONSET: ONSET: ON-GOING

## 2019-10-26 LAB
ANION GAP SERPL CALCULATED.3IONS-SCNC: 14 MMOL/L (ref 3–16)
ANISOCYTOSIS: ABNORMAL
BASOPHILS ABSOLUTE: 0 K/UL (ref 0–0.2)
BASOPHILS RELATIVE PERCENT: 0 %
BLOOD CULTURE, ROUTINE: ABNORMAL
BLOOD CULTURE, ROUTINE: ABNORMAL
BUN BLDV-MCNC: 59 MG/DL (ref 7–20)
CALCIUM SERPL-MCNC: 9.2 MG/DL (ref 8.3–10.6)
CHLORIDE BLD-SCNC: 92 MMOL/L (ref 99–110)
CO2: 30 MMOL/L (ref 21–32)
CREAT SERPL-MCNC: 1.9 MG/DL (ref 0.8–1.3)
CULTURE, BLOOD 2: ABNORMAL
EOSINOPHILS ABSOLUTE: 0 K/UL (ref 0–0.6)
EOSINOPHILS RELATIVE PERCENT: 0 %
GFR AFRICAN AMERICAN: 41
GFR NON-AFRICAN AMERICAN: 34
GLUCOSE BLD-MCNC: 144 MG/DL (ref 70–99)
GLUCOSE BLD-MCNC: 151 MG/DL (ref 70–99)
GLUCOSE BLD-MCNC: 177 MG/DL (ref 70–99)
GLUCOSE BLD-MCNC: 233 MG/DL (ref 70–99)
GLUCOSE BLD-MCNC: 235 MG/DL (ref 70–99)
HCT VFR BLD CALC: 34.5 % (ref 40.5–52.5)
HEMATOLOGY PATH CONSULT: NO
HEMOGLOBIN: 11.4 G/DL (ref 13.5–17.5)
LYMPHOCYTES ABSOLUTE: 2.5 K/UL (ref 1–5.1)
LYMPHOCYTES RELATIVE PERCENT: 17 %
MCH RBC QN AUTO: 29.9 PG (ref 26–34)
MCHC RBC AUTO-ENTMCNC: 33 G/DL (ref 31–36)
MCV RBC AUTO: 90.5 FL (ref 80–100)
METAMYELOCYTES RELATIVE PERCENT: 1 %
MONOCYTES ABSOLUTE: 2.1 K/UL (ref 0–1.3)
MONOCYTES RELATIVE PERCENT: 14 %
NEUTROPHILS ABSOLUTE: 10.2 K/UL (ref 1.7–7.7)
NEUTROPHILS RELATIVE PERCENT: 68 %
ORGANISM: ABNORMAL
ORGANISM: ABNORMAL
PDW BLD-RTO: 18.1 % (ref 12.4–15.4)
PERFORMED ON: ABNORMAL
PLATELET # BLD: 408 K/UL (ref 135–450)
PMV BLD AUTO: 7.2 FL (ref 5–10.5)
POLYCHROMASIA: ABNORMAL
POTASSIUM REFLEX MAGNESIUM: 5 MMOL/L (ref 3.5–5.1)
RBC # BLD: 3.82 M/UL (ref 4.2–5.9)
SODIUM BLD-SCNC: 136 MMOL/L (ref 136–145)
SPHEROCYTES: ABNORMAL
WBC # BLD: 14.8 K/UL (ref 4–11)

## 2019-10-26 PROCEDURE — 99222 1ST HOSP IP/OBS MODERATE 55: CPT | Performed by: INTERNAL MEDICINE

## 2019-10-26 PROCEDURE — 92526 ORAL FUNCTION THERAPY: CPT

## 2019-10-26 PROCEDURE — 36415 COLL VENOUS BLD VENIPUNCTURE: CPT

## 2019-10-26 PROCEDURE — 94640 AIRWAY INHALATION TREATMENT: CPT

## 2019-10-26 PROCEDURE — 6370000000 HC RX 637 (ALT 250 FOR IP): Performed by: INTERNAL MEDICINE

## 2019-10-26 PROCEDURE — 2580000003 HC RX 258: Performed by: INTERNAL MEDICINE

## 2019-10-26 PROCEDURE — 6360000002 HC RX W HCPCS: Performed by: INTERNAL MEDICINE

## 2019-10-26 PROCEDURE — 1200000000 HC SEMI PRIVATE

## 2019-10-26 PROCEDURE — 80048 BASIC METABOLIC PNL TOTAL CA: CPT

## 2019-10-26 PROCEDURE — 99232 SBSQ HOSP IP/OBS MODERATE 35: CPT | Performed by: INTERNAL MEDICINE

## 2019-10-26 PROCEDURE — 87040 BLOOD CULTURE FOR BACTERIA: CPT

## 2019-10-26 PROCEDURE — 94761 N-INVAS EAR/PLS OXIMETRY MLT: CPT

## 2019-10-26 PROCEDURE — 2700000000 HC OXYGEN THERAPY PER DAY

## 2019-10-26 PROCEDURE — 85025 COMPLETE CBC W/AUTO DIFF WBC: CPT

## 2019-10-26 RX ORDER — CIPROFLOXACIN 2 MG/ML
400 INJECTION, SOLUTION INTRAVENOUS EVERY 12 HOURS
Status: DISCONTINUED | OUTPATIENT
Start: 2019-10-26 | End: 2019-10-27 | Stop reason: HOSPADM

## 2019-10-26 RX ADMIN — INSULIN GLARGINE 18 UNITS: 100 INJECTION, SOLUTION SUBCUTANEOUS at 21:27

## 2019-10-26 RX ADMIN — FLUTICASONE PROPIONATE 1 SPRAY: 50 SPRAY, METERED NASAL at 09:58

## 2019-10-26 RX ADMIN — MICONAZOLE NITRATE: 20 POWDER TOPICAL at 10:04

## 2019-10-26 RX ADMIN — APIXABAN 2.5 MG: 2.5 TABLET, FILM COATED ORAL at 09:57

## 2019-10-26 RX ADMIN — INSULIN LISPRO 2 UNITS: 100 INJECTION, SOLUTION INTRAVENOUS; SUBCUTANEOUS at 13:11

## 2019-10-26 RX ADMIN — Medication 10 ML: at 21:31

## 2019-10-26 RX ADMIN — METOCLOPRAMIDE HYDROCHLORIDE 10 MG: 10 TABLET ORAL at 09:56

## 2019-10-26 RX ADMIN — INSULIN LISPRO 4 UNITS: 100 INJECTION, SOLUTION INTRAVENOUS; SUBCUTANEOUS at 13:11

## 2019-10-26 RX ADMIN — INSULIN LISPRO 2 UNITS: 100 INJECTION, SOLUTION INTRAVENOUS; SUBCUTANEOUS at 17:34

## 2019-10-26 RX ADMIN — OXYCODONE HYDROCHLORIDE 10 MG: 5 TABLET ORAL at 19:36

## 2019-10-26 RX ADMIN — MICONAZOLE NITRATE: 20 POWDER TOPICAL at 21:30

## 2019-10-26 RX ADMIN — Medication 1 TABLET: at 09:57

## 2019-10-26 RX ADMIN — OXYCODONE HYDROCHLORIDE 10 MG: 5 TABLET ORAL at 09:50

## 2019-10-26 RX ADMIN — IPRATROPIUM BROMIDE AND ALBUTEROL SULFATE 3 ML: .5; 3 SOLUTION RESPIRATORY (INHALATION) at 20:14

## 2019-10-26 RX ADMIN — INSULIN LISPRO 4 UNITS: 100 INJECTION, SOLUTION INTRAVENOUS; SUBCUTANEOUS at 09:43

## 2019-10-26 RX ADMIN — NYSTATIN 500000 UNITS: 100000 SUSPENSION ORAL at 14:20

## 2019-10-26 RX ADMIN — SPIRONOLACTONE 25 MG: 25 TABLET ORAL at 09:57

## 2019-10-26 RX ADMIN — OXYCODONE HYDROCHLORIDE 10 MG: 5 TABLET ORAL at 14:02

## 2019-10-26 RX ADMIN — CIPROFLOXACIN 400 MG: 2 INJECTION, SOLUTION INTRAVENOUS at 23:30

## 2019-10-26 RX ADMIN — INSULIN LISPRO 1 UNITS: 100 INJECTION, SOLUTION INTRAVENOUS; SUBCUTANEOUS at 21:27

## 2019-10-26 RX ADMIN — CARVEDILOL 3.12 MG: 6.25 TABLET, FILM COATED ORAL at 21:27

## 2019-10-26 RX ADMIN — NYSTATIN 500000 UNITS: 100000 SUSPENSION ORAL at 21:27

## 2019-10-26 RX ADMIN — FERROUS SULFATE TAB 325 MG (65 MG ELEMENTAL FE) 325 MG: 325 (65 FE) TAB at 09:57

## 2019-10-26 RX ADMIN — RANOLAZINE 500 MG: 500 TABLET, FILM COATED, EXTENDED RELEASE ORAL at 09:57

## 2019-10-26 RX ADMIN — PIPERACILLIN AND TAZOBACTAM 3.38 G: 3; .375 INJECTION, POWDER, LYOPHILIZED, FOR SOLUTION INTRAVENOUS at 03:12

## 2019-10-26 RX ADMIN — NYSTATIN 500000 UNITS: 100000 SUSPENSION ORAL at 17:46

## 2019-10-26 RX ADMIN — FERROUS SULFATE TAB 325 MG (65 MG ELEMENTAL FE) 325 MG: 325 (65 FE) TAB at 17:46

## 2019-10-26 RX ADMIN — RANOLAZINE 500 MG: 500 TABLET, FILM COATED, EXTENDED RELEASE ORAL at 21:27

## 2019-10-26 RX ADMIN — NYSTATIN 500000 UNITS: 100000 SUSPENSION ORAL at 10:02

## 2019-10-26 RX ADMIN — CARVEDILOL 3.12 MG: 6.25 TABLET, FILM COATED ORAL at 09:58

## 2019-10-26 RX ADMIN — INSULIN LISPRO 4 UNITS: 100 INJECTION, SOLUTION INTRAVENOUS; SUBCUTANEOUS at 17:33

## 2019-10-26 RX ADMIN — TORSEMIDE 20 MG: 20 TABLET ORAL at 09:57

## 2019-10-26 RX ADMIN — PREDNISONE 20 MG: 20 TABLET ORAL at 09:57

## 2019-10-26 RX ADMIN — ATORVASTATIN CALCIUM 20 MG: 10 TABLET, FILM COATED ORAL at 09:57

## 2019-10-26 RX ADMIN — ALLOPURINOL 300 MG: 300 TABLET ORAL at 09:56

## 2019-10-26 RX ADMIN — OXYCODONE HYDROCHLORIDE 10 MG: 5 TABLET ORAL at 23:35

## 2019-10-26 RX ADMIN — PANTOPRAZOLE SODIUM 40 MG: 40 TABLET, DELAYED RELEASE ORAL at 06:05

## 2019-10-26 RX ADMIN — APIXABAN 2.5 MG: 2.5 TABLET, FILM COATED ORAL at 21:29

## 2019-10-26 RX ADMIN — CIPROFLOXACIN 400 MG: 2 INJECTION, SOLUTION INTRAVENOUS at 11:24

## 2019-10-26 ASSESSMENT — PAIN SCALES - GENERAL
PAINLEVEL_OUTOF10: 8
PAINLEVEL_OUTOF10: 10
PAINLEVEL_OUTOF10: 8
PAINLEVEL_OUTOF10: 10
PAINLEVEL_OUTOF10: 6

## 2019-10-27 VITALS
TEMPERATURE: 98.5 F | OXYGEN SATURATION: 96 % | SYSTOLIC BLOOD PRESSURE: 142 MMHG | WEIGHT: 204.81 LBS | BODY MASS INDEX: 31.04 KG/M2 | HEIGHT: 68 IN | DIASTOLIC BLOOD PRESSURE: 87 MMHG | RESPIRATION RATE: 20 BRPM | HEART RATE: 99 BPM

## 2019-10-27 PROBLEM — M54.9 BACK PAIN: Status: ACTIVE | Noted: 2019-10-27

## 2019-10-27 LAB
GLUCOSE BLD-MCNC: 150 MG/DL (ref 70–99)
GLUCOSE BLD-MCNC: 153 MG/DL (ref 70–99)
GLUCOSE BLD-MCNC: 175 MG/DL (ref 70–99)
PERFORMED ON: ABNORMAL

## 2019-10-27 PROCEDURE — 2580000003 HC RX 258: Performed by: INTERNAL MEDICINE

## 2019-10-27 PROCEDURE — 6360000002 HC RX W HCPCS: Performed by: INTERNAL MEDICINE

## 2019-10-27 PROCEDURE — 6370000000 HC RX 637 (ALT 250 FOR IP): Performed by: INTERNAL MEDICINE

## 2019-10-27 PROCEDURE — 99232 SBSQ HOSP IP/OBS MODERATE 35: CPT | Performed by: INTERNAL MEDICINE

## 2019-10-27 PROCEDURE — 94640 AIRWAY INHALATION TREATMENT: CPT

## 2019-10-27 PROCEDURE — 2700000000 HC OXYGEN THERAPY PER DAY

## 2019-10-27 PROCEDURE — 94761 N-INVAS EAR/PLS OXIMETRY MLT: CPT

## 2019-10-27 PROCEDURE — 94669 MECHANICAL CHEST WALL OSCILL: CPT

## 2019-10-27 RX ORDER — NITROGLYCERIN 0.4 MG/1
TABLET SUBLINGUAL
Qty: 25 TABLET | Refills: 3 | DISCHARGE
Start: 2019-10-27

## 2019-10-27 RX ORDER — METOCLOPRAMIDE 10 MG/1
10 TABLET ORAL DAILY
Qty: 120 TABLET | Refills: 3 | DISCHARGE
Start: 2019-10-28

## 2019-10-27 RX ORDER — M-VIT,TX,IRON,MINS/CALC/FOLIC 27MG-0.4MG
1 TABLET ORAL DAILY
Status: ON HOLD | DISCHARGE
Start: 2019-10-28 | End: 2020-11-09 | Stop reason: HOSPADM

## 2019-10-27 RX ORDER — INSULIN GLARGINE 100 [IU]/ML
18 INJECTION, SOLUTION SUBCUTANEOUS NIGHTLY
Qty: 1 VIAL | Refills: 3 | Status: ON HOLD | DISCHARGE
Start: 2019-10-27 | End: 2020-11-09 | Stop reason: SDUPTHER

## 2019-10-27 RX ORDER — OXYCODONE HYDROCHLORIDE 10 MG/1
10 TABLET ORAL EVERY 4 HOURS PRN
Qty: 24 TABLET | Refills: 0 | Status: SHIPPED | OUTPATIENT
Start: 2019-10-27 | End: 2019-10-30

## 2019-10-27 RX ORDER — CIPROFLOXACIN 250 MG/1
250 TABLET, FILM COATED ORAL 2 TIMES DAILY
Refills: 0 | DISCHARGE
Start: 2019-10-27 | End: 2019-11-06

## 2019-10-27 RX ORDER — TORSEMIDE 20 MG/1
20 TABLET ORAL DAILY
Qty: 30 TABLET | Refills: 3 | Status: ON HOLD | DISCHARGE
Start: 2019-10-28 | End: 2020-03-09 | Stop reason: SDUPTHER

## 2019-10-27 RX ADMIN — NYSTATIN 500000 UNITS: 100000 SUSPENSION ORAL at 14:06

## 2019-10-27 RX ADMIN — OXYCODONE HYDROCHLORIDE 10 MG: 5 TABLET ORAL at 13:11

## 2019-10-27 RX ADMIN — ATORVASTATIN CALCIUM 20 MG: 10 TABLET, FILM COATED ORAL at 08:17

## 2019-10-27 RX ADMIN — INSULIN LISPRO 4 UNITS: 100 INJECTION, SOLUTION INTRAVENOUS; SUBCUTANEOUS at 08:20

## 2019-10-27 RX ADMIN — ALLOPURINOL 300 MG: 300 TABLET ORAL at 08:17

## 2019-10-27 RX ADMIN — Medication 1 TABLET: at 08:16

## 2019-10-27 RX ADMIN — IPRATROPIUM BROMIDE AND ALBUTEROL SULFATE 3 ML: .5; 3 SOLUTION RESPIRATORY (INHALATION) at 08:14

## 2019-10-27 RX ADMIN — CEFEPIME HYDROCHLORIDE 1 G: 1 INJECTION, POWDER, FOR SOLUTION INTRAMUSCULAR; INTRAVENOUS at 13:12

## 2019-10-27 RX ADMIN — OXYCODONE HYDROCHLORIDE 10 MG: 5 TABLET ORAL at 08:15

## 2019-10-27 RX ADMIN — FLUTICASONE PROPIONATE 1 SPRAY: 50 SPRAY, METERED NASAL at 08:18

## 2019-10-27 RX ADMIN — INSULIN LISPRO 1 UNITS: 100 INJECTION, SOLUTION INTRAVENOUS; SUBCUTANEOUS at 08:22

## 2019-10-27 RX ADMIN — METOCLOPRAMIDE HYDROCHLORIDE 10 MG: 10 TABLET ORAL at 08:17

## 2019-10-27 RX ADMIN — CARVEDILOL 3.12 MG: 6.25 TABLET, FILM COATED ORAL at 08:16

## 2019-10-27 RX ADMIN — CIPROFLOXACIN 400 MG: 2 INJECTION, SOLUTION INTRAVENOUS at 11:34

## 2019-10-27 RX ADMIN — PANTOPRAZOLE SODIUM 40 MG: 40 TABLET, DELAYED RELEASE ORAL at 06:33

## 2019-10-27 RX ADMIN — Medication 10 ML: at 08:25

## 2019-10-27 RX ADMIN — FERROUS SULFATE TAB 325 MG (65 MG ELEMENTAL FE) 325 MG: 325 (65 FE) TAB at 08:17

## 2019-10-27 RX ADMIN — RANOLAZINE 500 MG: 500 TABLET, FILM COATED, EXTENDED RELEASE ORAL at 08:17

## 2019-10-27 RX ADMIN — APIXABAN 2.5 MG: 2.5 TABLET, FILM COATED ORAL at 09:01

## 2019-10-27 RX ADMIN — PREDNISONE 20 MG: 20 TABLET ORAL at 08:16

## 2019-10-27 RX ADMIN — INSULIN LISPRO 4 UNITS: 100 INJECTION, SOLUTION INTRAVENOUS; SUBCUTANEOUS at 13:17

## 2019-10-27 RX ADMIN — NYSTATIN 500000 UNITS: 100000 SUSPENSION ORAL at 08:17

## 2019-10-27 RX ADMIN — MICONAZOLE NITRATE: 20 POWDER TOPICAL at 08:23

## 2019-10-27 RX ADMIN — SPIRONOLACTONE 25 MG: 25 TABLET ORAL at 08:17

## 2019-10-27 RX ADMIN — TORSEMIDE 20 MG: 20 TABLET ORAL at 08:17

## 2019-10-27 RX ADMIN — INSULIN LISPRO 1 UNITS: 100 INJECTION, SOLUTION INTRAVENOUS; SUBCUTANEOUS at 13:17

## 2019-10-27 ASSESSMENT — PAIN DESCRIPTION - LOCATION: LOCATION: BACK

## 2019-10-27 ASSESSMENT — PAIN SCALES - GENERAL
PAINLEVEL_OUTOF10: 10
PAINLEVEL_OUTOF10: 10
PAINLEVEL_OUTOF10: 8

## 2019-10-27 ASSESSMENT — PAIN DESCRIPTION - PAIN TYPE: TYPE: ACUTE PAIN;CHRONIC PAIN

## 2019-10-31 LAB
BLOOD CULTURE, ROUTINE: NORMAL
CULTURE, BLOOD 2: NORMAL

## 2019-12-10 LAB
AFB CULTURE (MYCOBACTERIA): NORMAL
AFB SMEAR: NORMAL

## 2020-03-05 ENCOUNTER — APPOINTMENT (OUTPATIENT)
Dept: CT IMAGING | Age: 85
DRG: 871 | End: 2020-03-05
Payer: MEDICARE

## 2020-03-05 ENCOUNTER — HOSPITAL ENCOUNTER (INPATIENT)
Age: 85
LOS: 4 days | Discharge: NURSING, REHAB OR LONG-TERM CARE | DRG: 871 | End: 2020-03-09
Attending: EMERGENCY MEDICINE | Admitting: INTERNAL MEDICINE
Payer: MEDICARE

## 2020-03-05 PROBLEM — N39.0 UTI (URINARY TRACT INFECTION): Status: ACTIVE | Noted: 2020-03-05

## 2020-03-05 LAB
A/G RATIO: 0.9 (ref 1.1–2.2)
ALBUMIN SERPL-MCNC: 3.6 G/DL (ref 3.4–5)
ALP BLD-CCNC: 89 U/L (ref 40–129)
ALT SERPL-CCNC: 12 U/L (ref 10–40)
ANION GAP SERPL CALCULATED.3IONS-SCNC: 16 MMOL/L (ref 3–16)
ANISOCYTOSIS: ABNORMAL
AST SERPL-CCNC: 24 U/L (ref 15–37)
BACTERIA: ABNORMAL /HPF
BANDED NEUTROPHILS RELATIVE PERCENT: 2 % (ref 0–7)
BASE EXCESS VENOUS: 7 MMOL/L (ref -3–3)
BASOPHILS ABSOLUTE: 0 K/UL (ref 0–0.2)
BASOPHILS RELATIVE PERCENT: 0 %
BILIRUB SERPL-MCNC: 0.3 MG/DL (ref 0–1)
BILIRUBIN URINE: NEGATIVE
BLOOD, URINE: NEGATIVE
BUN BLDV-MCNC: 57 MG/DL (ref 7–20)
CALCIUM SERPL-MCNC: 9.4 MG/DL (ref 8.3–10.6)
CARBOXYHEMOGLOBIN: 2.1 % (ref 0–1.5)
CHLORIDE BLD-SCNC: 86 MMOL/L (ref 99–110)
CLARITY: ABNORMAL
CO2: 32 MMOL/L (ref 21–32)
COLOR: YELLOW
CREAT SERPL-MCNC: 1.7 MG/DL (ref 0.8–1.3)
EKG ATRIAL RATE: 104 BPM
EKG DIAGNOSIS: NORMAL
EKG Q-T INTERVAL: 444 MS
EKG QRS DURATION: 158 MS
EKG QTC CALCULATION (BAZETT): 572 MS
EKG R AXIS: -78 DEGREES
EKG T AXIS: 46 DEGREES
EKG VENTRICULAR RATE: 100 BPM
EOSINOPHILS ABSOLUTE: 0.2 K/UL (ref 0–0.6)
EOSINOPHILS RELATIVE PERCENT: 1 %
EPITHELIAL CELLS, UA: ABNORMAL /HPF (ref 0–5)
GFR AFRICAN AMERICAN: 47
GFR NON-AFRICAN AMERICAN: 39
GLOBULIN: 3.8 G/DL
GLUCOSE BLD-MCNC: 162 MG/DL (ref 70–99)
GLUCOSE BLD-MCNC: 188 MG/DL (ref 70–99)
GLUCOSE URINE: NEGATIVE MG/DL
HCO3 VENOUS: 32.8 MMOL/L (ref 23–29)
HCT VFR BLD CALC: 30.8 % (ref 40.5–52.5)
HEMOGLOBIN: 10 G/DL (ref 13.5–17.5)
KETONES, URINE: NEGATIVE MG/DL
LACTIC ACID, SEPSIS: 1.5 MMOL/L (ref 0.4–1.9)
LACTIC ACID, SEPSIS: 1.7 MMOL/L (ref 0.4–1.9)
LACTIC ACID, SEPSIS: 1.9 MMOL/L (ref 0.4–1.9)
LEUKOCYTE ESTERASE, URINE: ABNORMAL
LIPASE: 8 U/L (ref 13–60)
LYMPHOCYTES ABSOLUTE: 1.6 K/UL (ref 1–5.1)
LYMPHOCYTES RELATIVE PERCENT: 8 %
MACROCYTES: ABNORMAL
MAGNESIUM: 2 MG/DL (ref 1.8–2.4)
MCH RBC QN AUTO: 30.1 PG (ref 26–34)
MCHC RBC AUTO-ENTMCNC: 32.6 G/DL (ref 31–36)
MCV RBC AUTO: 92.3 FL (ref 80–100)
METHEMOGLOBIN VENOUS: 0.1 %
MICROSCOPIC EXAMINATION: YES
MONOCYTES ABSOLUTE: 1.6 K/UL (ref 0–1.3)
MONOCYTES RELATIVE PERCENT: 8 %
NEUTROPHILS ABSOLUTE: 16.8 K/UL (ref 1.7–7.7)
NEUTROPHILS RELATIVE PERCENT: 81 %
NITRITE, URINE: NEGATIVE
O2 CONTENT, VEN: 11 VOL %
O2 SAT, VEN: 77 %
O2 THERAPY: ABNORMAL
PCO2, VEN: 52.9 MMHG (ref 40–50)
PDW BLD-RTO: 18.3 % (ref 12.4–15.4)
PERFORMED ON: ABNORMAL
PH UA: 5.5 (ref 5–8)
PH VENOUS: 7.41 (ref 7.35–7.45)
PLATELET # BLD: 249 K/UL (ref 135–450)
PLATELET SLIDE REVIEW: ADEQUATE
PMV BLD AUTO: 8.7 FL (ref 5–10.5)
PO2, VEN: 41.1 MMHG (ref 25–40)
POIKILOCYTES: ABNORMAL
POLYCHROMASIA: ABNORMAL
POTASSIUM REFLEX MAGNESIUM: 3.4 MMOL/L (ref 3.5–5.1)
PROTEIN UA: NEGATIVE MG/DL
RBC # BLD: 3.34 M/UL (ref 4.2–5.9)
RBC UA: ABNORMAL /HPF (ref 0–4)
SLIDE REVIEW: ABNORMAL
SODIUM BLD-SCNC: 134 MMOL/L (ref 136–145)
SPECIFIC GRAVITY UA: 1.01 (ref 1–1.03)
TCO2 CALC VENOUS: 34 MMOL/L
TOTAL PROTEIN: 7.4 G/DL (ref 6.4–8.2)
TROPONIN: 0.05 NG/ML
URINE TYPE: ABNORMAL
UROBILINOGEN, URINE: 0.2 E.U./DL
WBC # BLD: 20.3 K/UL (ref 4–11)
WBC UA: ABNORMAL /HPF (ref 0–5)

## 2020-03-05 PROCEDURE — 83690 ASSAY OF LIPASE: CPT

## 2020-03-05 PROCEDURE — 85025 COMPLETE CBC W/AUTO DIFF WBC: CPT

## 2020-03-05 PROCEDURE — 84484 ASSAY OF TROPONIN QUANT: CPT

## 2020-03-05 PROCEDURE — 2580000003 HC RX 258: Performed by: EMERGENCY MEDICINE

## 2020-03-05 PROCEDURE — 80053 COMPREHEN METABOLIC PANEL: CPT

## 2020-03-05 PROCEDURE — 2580000003 HC RX 258: Performed by: INTERNAL MEDICINE

## 2020-03-05 PROCEDURE — 83036 HEMOGLOBIN GLYCOSYLATED A1C: CPT

## 2020-03-05 PROCEDURE — 6360000002 HC RX W HCPCS: Performed by: EMERGENCY MEDICINE

## 2020-03-05 PROCEDURE — 2700000000 HC OXYGEN THERAPY PER DAY

## 2020-03-05 PROCEDURE — 83605 ASSAY OF LACTIC ACID: CPT

## 2020-03-05 PROCEDURE — 82803 BLOOD GASES ANY COMBINATION: CPT

## 2020-03-05 PROCEDURE — 99285 EMERGENCY DEPT VISIT HI MDM: CPT

## 2020-03-05 PROCEDURE — 93005 ELECTROCARDIOGRAM TRACING: CPT | Performed by: EMERGENCY MEDICINE

## 2020-03-05 PROCEDURE — 81001 URINALYSIS AUTO W/SCOPE: CPT

## 2020-03-05 PROCEDURE — 6370000000 HC RX 637 (ALT 250 FOR IP): Performed by: INTERNAL MEDICINE

## 2020-03-05 PROCEDURE — 70450 CT HEAD/BRAIN W/O DYE: CPT

## 2020-03-05 PROCEDURE — 94761 N-INVAS EAR/PLS OXIMETRY MLT: CPT

## 2020-03-05 PROCEDURE — 1200000000 HC SEMI PRIVATE

## 2020-03-05 PROCEDURE — 94640 AIRWAY INHALATION TREATMENT: CPT

## 2020-03-05 PROCEDURE — 83735 ASSAY OF MAGNESIUM: CPT

## 2020-03-05 PROCEDURE — 87040 BLOOD CULTURE FOR BACTERIA: CPT

## 2020-03-05 PROCEDURE — 6370000000 HC RX 637 (ALT 250 FOR IP): Performed by: NURSE PRACTITIONER

## 2020-03-05 RX ORDER — LIDOCAINE 50 MG/G
1 PATCH TOPICAL DAILY
COMMUNITY

## 2020-03-05 RX ORDER — ONDANSETRON 2 MG/ML
4 INJECTION INTRAMUSCULAR; INTRAVENOUS EVERY 6 HOURS PRN
Status: DISCONTINUED | OUTPATIENT
Start: 2020-03-05 | End: 2020-03-05

## 2020-03-05 RX ORDER — FERROUS SULFATE 325(65) MG
325 TABLET ORAL 2 TIMES DAILY
COMMUNITY

## 2020-03-05 RX ORDER — POLYETHYLENE GLYCOL 3350 17 G/17G
17 POWDER, FOR SOLUTION ORAL DAILY PRN
Status: DISCONTINUED | OUTPATIENT
Start: 2020-03-05 | End: 2020-03-09 | Stop reason: HOSPADM

## 2020-03-05 RX ORDER — SENNA AND DOCUSATE SODIUM 50; 8.6 MG/1; MG/1
1 TABLET, FILM COATED ORAL NIGHTLY
COMMUNITY

## 2020-03-05 RX ORDER — CARVEDILOL 3.12 MG/1
3.12 TABLET ORAL 2 TIMES DAILY
Status: DISCONTINUED | OUTPATIENT
Start: 2020-03-05 | End: 2020-03-09 | Stop reason: HOSPADM

## 2020-03-05 RX ORDER — INSULIN GLARGINE 100 [IU]/ML
18 INJECTION, SOLUTION SUBCUTANEOUS NIGHTLY
Status: DISCONTINUED | OUTPATIENT
Start: 2020-03-05 | End: 2020-03-09 | Stop reason: HOSPADM

## 2020-03-05 RX ORDER — METOLAZONE 5 MG/1
5 TABLET ORAL
Status: ON HOLD | COMMUNITY
End: 2020-11-09 | Stop reason: HOSPADM

## 2020-03-05 RX ORDER — ALLOPURINOL 300 MG/1
300 TABLET ORAL DAILY
Status: DISCONTINUED | OUTPATIENT
Start: 2020-03-06 | End: 2020-03-09 | Stop reason: HOSPADM

## 2020-03-05 RX ORDER — METOCLOPRAMIDE 10 MG/1
10 TABLET ORAL NIGHTLY
Status: DISCONTINUED | OUTPATIENT
Start: 2020-03-05 | End: 2020-03-05

## 2020-03-05 RX ORDER — DEXTROSE MONOHYDRATE 50 MG/ML
100 INJECTION, SOLUTION INTRAVENOUS PRN
Status: DISCONTINUED | OUTPATIENT
Start: 2020-03-05 | End: 2020-03-09 | Stop reason: HOSPADM

## 2020-03-05 RX ORDER — DEXTROSE MONOHYDRATE 25 G/50ML
12.5 INJECTION, SOLUTION INTRAVENOUS PRN
Status: DISCONTINUED | OUTPATIENT
Start: 2020-03-05 | End: 2020-03-09 | Stop reason: HOSPADM

## 2020-03-05 RX ORDER — FLUTICASONE FUROATE AND VILANTEROL 100; 25 UG/1; UG/1
1 POWDER RESPIRATORY (INHALATION) DAILY
COMMUNITY

## 2020-03-05 RX ORDER — SODIUM CHLORIDE 0.9 % (FLUSH) 0.9 %
10 SYRINGE (ML) INJECTION PRN
Status: DISCONTINUED | OUTPATIENT
Start: 2020-03-05 | End: 2020-03-09 | Stop reason: HOSPADM

## 2020-03-05 RX ORDER — POLYETHYLENE GLYCOL 3350 17 G/17G
17 POWDER, FOR SOLUTION ORAL EVERY OTHER DAY
Status: DISCONTINUED | OUTPATIENT
Start: 2020-03-05 | End: 2020-03-09 | Stop reason: HOSPADM

## 2020-03-05 RX ORDER — PROMETHAZINE HYDROCHLORIDE 25 MG/1
12.5 TABLET ORAL EVERY 6 HOURS PRN
Status: DISCONTINUED | OUTPATIENT
Start: 2020-03-05 | End: 2020-03-09 | Stop reason: HOSPADM

## 2020-03-05 RX ORDER — ACETAMINOPHEN 650 MG/1
650 SUPPOSITORY RECTAL EVERY 6 HOURS PRN
Status: DISCONTINUED | OUTPATIENT
Start: 2020-03-05 | End: 2020-03-09 | Stop reason: HOSPADM

## 2020-03-05 RX ORDER — OXYCODONE HCL 10 MG/1
10 TABLET, FILM COATED, EXTENDED RELEASE ORAL EVERY 12 HOURS
Status: ON HOLD | COMMUNITY
End: 2020-03-09 | Stop reason: SDUPTHER

## 2020-03-05 RX ORDER — POTASSIUM CHLORIDE 750 MG/1
10 TABLET, EXTENDED RELEASE ORAL DAILY
Status: DISCONTINUED | OUTPATIENT
Start: 2020-03-05 | End: 2020-03-09 | Stop reason: HOSPADM

## 2020-03-05 RX ORDER — RANOLAZINE 500 MG/1
500 TABLET, EXTENDED RELEASE ORAL 2 TIMES DAILY
Status: DISCONTINUED | OUTPATIENT
Start: 2020-03-05 | End: 2020-03-09 | Stop reason: HOSPADM

## 2020-03-05 RX ORDER — BUDESONIDE AND FORMOTEROL FUMARATE DIHYDRATE 160; 4.5 UG/1; UG/1
2 AEROSOL RESPIRATORY (INHALATION) 2 TIMES DAILY
Status: DISCONTINUED | OUTPATIENT
Start: 2020-03-05 | End: 2020-03-09 | Stop reason: HOSPADM

## 2020-03-05 RX ORDER — 0.9 % SODIUM CHLORIDE 0.9 %
1000 INTRAVENOUS SOLUTION INTRAVENOUS ONCE
Status: COMPLETED | OUTPATIENT
Start: 2020-03-05 | End: 2020-03-05

## 2020-03-05 RX ORDER — TRAZODONE HYDROCHLORIDE 50 MG/1
100 TABLET ORAL NIGHTLY
Status: DISCONTINUED | OUTPATIENT
Start: 2020-03-05 | End: 2020-03-09 | Stop reason: HOSPADM

## 2020-03-05 RX ORDER — NICOTINE POLACRILEX 4 MG
15 LOZENGE BUCCAL PRN
Status: DISCONTINUED | OUTPATIENT
Start: 2020-03-05 | End: 2020-03-09 | Stop reason: HOSPADM

## 2020-03-05 RX ORDER — METOCLOPRAMIDE 10 MG/1
5 TABLET ORAL NIGHTLY
Status: DISCONTINUED | OUTPATIENT
Start: 2020-03-06 | End: 2020-03-09 | Stop reason: HOSPADM

## 2020-03-05 RX ORDER — CALCITRIOL 0.25 UG/1
0.25 CAPSULE, LIQUID FILLED ORAL
Status: DISCONTINUED | OUTPATIENT
Start: 2020-03-06 | End: 2020-03-09 | Stop reason: HOSPADM

## 2020-03-05 RX ORDER — ATORVASTATIN CALCIUM 10 MG/1
20 TABLET, FILM COATED ORAL DAILY
Status: DISCONTINUED | OUTPATIENT
Start: 2020-03-05 | End: 2020-03-09 | Stop reason: HOSPADM

## 2020-03-05 RX ORDER — ACETAMINOPHEN 325 MG/1
650 TABLET ORAL EVERY 6 HOURS PRN
Status: DISCONTINUED | OUTPATIENT
Start: 2020-03-05 | End: 2020-03-09 | Stop reason: HOSPADM

## 2020-03-05 RX ORDER — SODIUM CHLORIDE 0.9 % (FLUSH) 0.9 %
10 SYRINGE (ML) INJECTION EVERY 12 HOURS SCHEDULED
Status: DISCONTINUED | OUTPATIENT
Start: 2020-03-05 | End: 2020-03-09 | Stop reason: HOSPADM

## 2020-03-05 RX ORDER — OMEPRAZOLE 40 MG/1
40 CAPSULE, DELAYED RELEASE ORAL DAILY
COMMUNITY

## 2020-03-05 RX ORDER — IPRATROPIUM BROMIDE AND ALBUTEROL SULFATE 2.5; .5 MG/3ML; MG/3ML
1 SOLUTION RESPIRATORY (INHALATION) EVERY 4 HOURS PRN
Status: DISCONTINUED | OUTPATIENT
Start: 2020-03-05 | End: 2020-03-09 | Stop reason: HOSPADM

## 2020-03-05 RX ORDER — LANOLIN ALCOHOL/MO/W.PET/CERES
3 CREAM (GRAM) TOPICAL NIGHTLY PRN
COMMUNITY

## 2020-03-05 RX ADMIN — CARVEDILOL 3.12 MG: 3.12 TABLET, FILM COATED ORAL at 21:50

## 2020-03-05 RX ADMIN — APIXABAN 2.5 MG: 5 TABLET, FILM COATED ORAL at 21:49

## 2020-03-05 RX ADMIN — Medication 2 PUFF: at 19:52

## 2020-03-05 RX ADMIN — INSULIN GLARGINE 18 UNITS: 100 INJECTION, SOLUTION SUBCUTANEOUS at 21:50

## 2020-03-05 RX ADMIN — RANOLAZINE 500 MG: 500 TABLET, FILM COATED, EXTENDED RELEASE ORAL at 21:49

## 2020-03-05 RX ADMIN — CEFTRIAXONE SODIUM 1 G: 1 INJECTION, POWDER, FOR SOLUTION INTRAMUSCULAR; INTRAVENOUS at 17:13

## 2020-03-05 RX ADMIN — INSULIN LISPRO 1 UNITS: 100 INJECTION, SOLUTION INTRAVENOUS; SUBCUTANEOUS at 21:50

## 2020-03-05 RX ADMIN — SODIUM CHLORIDE 1000 ML: 9 INJECTION, SOLUTION INTRAVENOUS at 17:12

## 2020-03-05 RX ADMIN — Medication 10 ML: at 21:51

## 2020-03-05 RX ADMIN — TRAZODONE HYDROCHLORIDE 100 MG: 50 TABLET ORAL at 21:49

## 2020-03-05 RX ADMIN — Medication 3 MG: at 23:26

## 2020-03-05 ASSESSMENT — ENCOUNTER SYMPTOMS
BACK PAIN: 0
SHORTNESS OF BREATH: 0
NAUSEA: 0
WHEEZING: 0
COUGH: 0
DIARRHEA: 0
ABDOMINAL PAIN: 0
VOMITING: 0
RHINORRHEA: 0
PHOTOPHOBIA: 0

## 2020-03-05 ASSESSMENT — PAIN SCALES - GENERAL
PAINLEVEL_OUTOF10: 3
PAINLEVEL_OUTOF10: 10

## 2020-03-05 NOTE — ED PROVIDER NOTES
Tim Garcia MD   insulin lispro (HUMALOG) 100 UNIT/ML injection vial Inject 4 Units into the skin 3 times daily (with meals) 10/27/19   Lilian Valencia MD   insulin lispro (HUMALOG) 100 UNIT/ML injection vial Inject 0-6 Units into the skin 3 times daily (with meals) 10/27/19   Lilian Valencia MD   torsemide (DEMADEX) 20 MG tablet Take 1 tablet by mouth daily  Patient taking differently: Take 100 mg by mouth daily  10/28/19   Lilian Valencia MD   metoclopramide (REGLAN) 10 MG tablet Take 1 tablet by mouth daily 10/28/19   Lilian Valencia MD   Multiple Vitamins-Minerals (THERAPEUTIC MULTIVITAMIN-MINERALS) tablet Take 1 tablet by mouth daily 10/28/19   Lilian Valencia MD   nitroGLYCERIN (NITROSTAT) 0.4 MG SL tablet up to max of 3 total doses. If no relief after 1 dose, call 911. 10/27/19   Lilian Valencia MD   Multiple Vitamins-Minerals (CENTRUM SILVER PO) Take 1 tablet by mouth daily    Historical Provider, MD   benzonatate (TESSALON) 100 MG capsule Take 100 mg by mouth 3 times daily as needed for Cough    Historical Provider, MD   potassium chloride (KLOR-CON M) 20 MEQ extended release tablet Take 10 mEq by mouth daily    Historical Provider, MD   allopurinol (ZYLOPRIM) 300 MG tablet Take 300 mg by mouth daily    Historical Provider, MD   calcitRIOL (ROCALTROL) 0.25 MCG capsule Take 0.25 mcg by mouth See Admin Instructions On M,W,F    Historical Provider, MD   traZODone (DESYREL) 100 MG tablet Take 100 mg by mouth nightly    Historical Provider, MD   gabapentin (NEURONTIN) 300 MG capsule Take 1 capsule by mouth 2 times daily as needed (pain) for up to 2 days. Patient taking differently: Take 300 mg by mouth nightly.   4/30/19 10/22/19  Sherman Goldberg MD   apixaban [facility]) 2.5 MG TABS tablet Take 1 tablet by mouth 2 times daily 1/11/19   Tyler Andrews MD   carvedilol (COREG) 3.125 MG tablet Take 1 tablet by mouth 2 times daily 5/24/18 10/22/19  Ashlee Park MD   polyethylene glycol Physical Exam  Vitals signs and nursing note reviewed. Constitutional:       General: He is not in acute distress. Appearance: He is well-developed. HENT:      Head: Normocephalic and atraumatic. Eyes:      Extraocular Movements: Extraocular movements intact. Pupils: Pupils are equal, round, and reactive to light. Neck:      Musculoskeletal: Normal range of motion and neck supple. Cardiovascular:      Rate and Rhythm: Normal rate and regular rhythm. Heart sounds: No murmur. Pulmonary:      Effort: Pulmonary effort is normal.      Breath sounds: Normal breath sounds. Abdominal:      General: There is no distension. Palpations: Abdomen is soft. Tenderness: There is no abdominal tenderness. Musculoskeletal: Normal range of motion. General: No deformity. Skin:     General: Skin is warm. Findings: No rash. Neurological:      Mental Status: He is alert. He is disoriented. Motor: No abnormal muscle tone. Coordination: Coordination normal.      Comments: NIH stroke scale 0, no cranial nerve deficits appreciated, strength is 5 out of 5 all extremities, sensation is intact, no central ataxia, normal finger-to-nose bilaterally. Psychiatric:         Behavior: Behavior normal.           ED Course    ED Medication Orders (From admission, onward)    Start Ordered     Status Ordering Provider    03/05/20 1630 03/05/20 1627  0.9 % sodium chloride bolus  ONCE      Acknowledged ELVI HARMON    03/05/20 1630 03/05/20 1627  cefTRIAXone (ROCEPHIN) 1 g IVPB in 50 mL D5W minibag  ONCE      Acknowledged ELVI HARMON          EKG  Ventricularly paced rhythm, rate 100, no diagnostic ischemic changes per scar Bosa criteria, .       Radiology  Ct Head Wo Contrast    Result Date: 3/5/2020  EXAMINATION: CT OF THE HEAD WITHOUT CONTRAST  3/5/2020 3:44 pm TECHNIQUE: CT of the head was performed without the administration of intravenous contrast. Dose modulation, iterative reconstruction, and/or weight based adjustment of the mA/kV was utilized to reduce the radiation dose to as low as reasonably achievable. COMPARISON: 08/27/2019 HISTORY: ORDERING SYSTEM PROVIDED HISTORY: AMS TECHNOLOGIST PROVIDED HISTORY: Reason for exam:->AMS Has a \"code stroke\" or \"stroke alert\" been called? ->No Reason for Exam: Headache with nausea with dehydration Acuity: Acute Type of Exam: Initial FINDINGS: BRAIN/VENTRICLES: The ventricles and sulci are diffusely enlarged. Low attenuation is seen in the periventricular and subcortical white matter. No acute intracranial hemorrhage or acute infarct is identified. ORBITS: The visualized portion of the orbits demonstrate no acute abnormality. SINUSES: The visualized paranasal sinuses and mastoid air cells demonstrate no acute abnormality. SOFT TISSUES/SKULL:  No acute abnormality of the visualized skull or soft tissues. No acute intracranial abnormality.  Diffuse atrophic changes with findings suggesting chronic microvascular ischemia         Labs  Results for orders placed or performed during the hospital encounter of 03/05/20   CBC Auto Differential   Result Value Ref Range    WBC 20.3 (H) 4.0 - 11.0 K/uL    RBC 3.34 (L) 4.20 - 5.90 M/uL    Hemoglobin 10.0 (L) 13.5 - 17.5 g/dL    Hematocrit 30.8 (L) 40.5 - 52.5 %    MCV 92.3 80.0 - 100.0 fL    MCH 30.1 26.0 - 34.0 pg    MCHC 32.6 31.0 - 36.0 g/dL    RDW 18.3 (H) 12.4 - 15.4 %    Platelets 315 304 - 878 K/uL    MPV 8.7 5.0 - 10.5 fL    PLATELET SLIDE REVIEW Adequate     SLIDE REVIEW see below     Neutrophils % 81.0 %    Lymphocytes % 8.0 %    Monocytes % 8.0 %    Eosinophils % 1.0 %    Basophils % 0.0 %    Neutrophils Absolute 16.8 (H) 1.7 - 7.7 K/uL    Lymphocytes Absolute 1.6 1.0 - 5.1 K/uL    Monocytes Absolute 1.6 (H) 0.0 - 1.3 K/uL    Eosinophils Absolute 0.2 0.0 - 0.6 K/uL    Basophils Absolute 0.0 0.0 - 0.2 K/uL    Bands Relative 2 0 - 7 %    Anisocytosis 2+ (A)     Macrocytes Occasional (A)     Polychromasia Occasional (A)     Poikilocytes Occasional (A)    Comprehensive Metabolic Panel w/ Reflex to MG   Result Value Ref Range    Sodium 134 (L) 136 - 145 mmol/L    Potassium reflex Magnesium 3.4 (L) 3.5 - 5.1 mmol/L    Chloride 86 (L) 99 - 110 mmol/L    CO2 32 21 - 32 mmol/L    Anion Gap 16 3 - 16    Glucose 188 (H) 70 - 99 mg/dL    BUN 57 (H) 7 - 20 mg/dL    CREATININE 1.7 (H) 0.8 - 1.3 mg/dL    GFR Non-African American 39 (A) >60    GFR  47 (A) >60    Calcium 9.4 8.3 - 10.6 mg/dL    Total Protein 7.4 6.4 - 8.2 g/dL    Alb 3.6 3.4 - 5.0 g/dL    Albumin/Globulin Ratio 0.9 (L) 1.1 - 2.2    Total Bilirubin 0.3 0.0 - 1.0 mg/dL    Alkaline Phosphatase 89 40 - 129 U/L    ALT 12 10 - 40 U/L    AST 24 15 - 37 U/L    Globulin 3.8 g/dL   Lipase   Result Value Ref Range    Lipase 8.0 (L) 13.0 - 60.0 U/L   Troponin   Result Value Ref Range    Troponin 0.05 (H) <0.01 ng/mL   Urinalysis, reflex to microscopic   Result Value Ref Range    Color, UA Yellow Straw/Yellow    Clarity, UA SL CLOUDY (A) Clear    Glucose, Ur Negative Negative mg/dL    Bilirubin Urine Negative Negative    Ketones, Urine Negative Negative mg/dL    Specific Gravity, UA 1.010 1.005 - 1.030    Blood, Urine Negative Negative    pH, UA 5.5 5.0 - 8.0    Protein, UA Negative Negative mg/dL    Urobilinogen, Urine 0.2 <2.0 E.U./dL    Nitrite, Urine Negative Negative    Leukocyte Esterase, Urine SMALL (A) Negative    Microscopic Examination YES     Urine Type NotGiven    Blood gas, venous   Result Value Ref Range    pH, Harrison 7.410 7.350 - 7.450    pCO2, Harrison 52.9 (H) 40.0 - 50.0 mmHg    pO2, Harrison 41.1 (H) 25.0 - 40.0 mmHg    HCO3, Venous 32.8 (H) 23.0 - 29.0 mmol/L    Base Excess, Harrison 7.0 (H) -3.0 - 3.0 mmol/L    O2 Sat, Harrison 77 Not Established %    Carboxyhemoglobin 2.1 (H) 0.0 - 1.5 %    MetHgb, Harrison 0.1 <1.5 %    TC02 (Calc), Harrison 34 Not Established mmol/L    O2 Content, Harrison 11 Not Established VOL %    O2 Therapy Unknown condition. Blood pressure 122/66, pulse 99, temperature 98.1 °F (36.7 °C), temperature source Oral, resp. rate (!) 31, height 5' 8\" (1.727 m), weight 204 lb (92.5 kg), SpO2 100 %. Patient was given scripts for the following medications. I counseled patient how to take these medications. New Prescriptions    No medications on file       Disposition referral (if applicable):  No follow-up provider specified. Total critical care time is 0 minutes, which excludes separately billable procedures and updating family. Time spent is specifically for management of the presenting complaint and symptoms initially, direct bedside care, reevaluation, review of records, and consultation. There was a high probability of clinically significant life-threatening deterioration in the patient's condition, which required my urgent intervention. This chart was generated in part by using Dragon Dictation system and may contain errors related to that system including errors in grammar, punctuation, and spelling, as well as words and phrases that may be inappropriate. If there are any questions or concerns please feel free to contact the dictating provider for clarification.      MD Vishnu Tapia MD  03/05/20 2711

## 2020-03-05 NOTE — H&P
Hospital Medicine History & Physical      PCP: Adri La    Date of Admission: 3/5/2020    Date of Service: Pt seen/examined on 03/05/20 and Admitted to Inpatient with expected LOS greater than two midnights due to medical therapy. Chief Complaint:  Confusion      History Of Present Illness:    81 y.o. male who presented to [facility] with confusion. Patient was noted to be more confused at his SNF this AM. Patient was having visual hallucinations. Patient had his torsemide dose increased recently due to worsening LE edema. Facility denies any recent trauma. Patient has decreased mental capacity at baseline and is a limited historian. Past Medical History:          Diagnosis Date    [facility] [address] [address]     Arthritis     CKD (chronic kidney disease), stage III ([facility] [address]     Sorin Iyer MD, [facility] Nephrology, [phone]    COPD (chronic obstructive pulmonary disease) ([facility] [address]     Diabetes mellitus ([facility] [address]     Hyperlipidemia     Hypertension     Pacemaker        Past Surgical History:          Procedure Laterality Date    ABDOMEN SURGERY  10/15/13    with j tube and lopez tube    BRONCHOSCOPY N/A 10/23/2019    BRONCHOSCOPY DIAGNOSTIC OR [facility] ONLY performed by Dillon Hernandez MD at [place] [address]  4/10/2013    Biventricular ICD Medtronic    CORONARY ARTERY BYPASS GRAFT      SKIN BIOPSY         Medications Prior to Admission:      Prior to Admission medications    Medication Sig Start Date End Date Taking?  Authorizing Provider   insulin glargine (LANTUS) 100 UNIT/ML injection vial Inject 18 Units into the skin nightly 10/27/19   Jazmin Weiss MD   insulin lispro (HUMALOG) 100 UNIT/ML injection vial Inject 0-3 Units into the skin nightly 10/27/19   Jazmin Weiss MD   insulin lispro (HUMALOG) 100 UNIT/ML injection vial Inject 4 Units into the skin 3 times daily (with meals) 10/27/19   Jazmin Weiss MD   insulin lispro (HUMALOG) 100 UNIT/ML injection vial Inject 0-6 Units into the skin 3 times daily (with meals) 10/27/19   Sima Pascual MD   torsemide (DEMADEX) 20 MG tablet Take 1 tablet by mouth daily  Patient taking differently: Take 100 mg by mouth daily  10/28/19   Sima Pascual MD   metoclopramide (REGLAN) 10 MG tablet Take 1 tablet by mouth daily 10/28/19   Sima Pascual MD   Multiple Vitamins-Minerals (THERAPEUTIC MULTIVITAMIN-MINERALS) tablet Take 1 tablet by mouth daily 10/28/19   Sima Pascual MD   nitroGLYCERIN (NITROSTAT) 0.4 MG SL tablet up to max of 3 total doses. If no relief after 1 dose, call 911. 10/27/19   Sima Pascual MD   Multiple Vitamins-Minerals (CENTRUM SILVER PO) Take 1 tablet by mouth daily    Historical Provider, MD   benzonatate (TESSALON) 100 MG capsule Take 100 mg by mouth 3 times daily as needed for Cough    Historical Provider, MD   potassium chloride (KLOR-CON M) 20 MEQ extended release tablet Take 10 mEq by mouth daily    Historical Provider, MD   allopurinol (ZYLOPRIM) 300 MG tablet Take 300 mg by mouth daily    Historical Provider, MD   calcitRIOL (ROCALTROL) 0.25 MCG capsule Take 0.25 mcg by mouth See Admin Instructions On M,W,F    Historical Provider, MD   traZODone (DESYREL) 100 MG tablet Take 100 mg by mouth nightly    Historical Provider, MD   gabapentin (NEURONTIN) 300 MG capsule Take 1 capsule by mouth 2 times daily as needed (pain) for up to 2 days. Patient taking differently: Take 300 mg by mouth nightly.   4/30/19 10/22/19  Izabella Villalba MD   apixaban (ELIQUIS) 2.5 MG TABS tablet Take 1 tablet by mouth 2 times daily 1/11/19   Caleb El MD   carvedilol (COREG) 3.125 MG tablet Take 1 tablet by mouth 2 times daily 5/24/18 10/22/19  Bibi Alvarado MD   polyethylene glycol [facility]) packet Take 17 g by mouth every other day     Historical Provider, MD   ranolazine (RANEXA) 500 MG extended release tablet Take 500 mg by mouth 2 times daily    Historical Provider, MD esomeprazole (NEXIUM) 40 MG delayed release capsule Take 40 mg by mouth every morning (before breakfast) 10/10/17   Historical Provider, MD   budesonide-formoterol (SYMBICORT) 160-4.5 MCG/ACT AERO Inhale 2 puffs into the lungs 2 times daily    Historical Provider, MD   albuterol (PROVENTIL HFA;VENTOLIN HFA) 108 (90 BASE) MCG/ACT inhaler Inhale 2 puffs into the lungs every 6 hours as needed. Historical Provider, MD   ipratropium-albuterol (DUONEB) 0.5-2.5 (3) MG/3ML SOLN nebulizer solution Inhale 1 vial into the lungs 3 times daily     Historical Provider, MD   atorvastatin (LIPITOR) 20 MG tablet Take 20 mg by mouth daily. Historical Provider, MD       Allergies: Iv [iodides]    Social History:      The patient currently lives at home    TOBACCO:   reports that he quit smoking about 6 years ago. His smoking use included cigarettes. He has a 120.00 pack-year smoking history. He has never used smokeless tobacco.  ETOH:   reports no history of alcohol use. E-Cigarettes Vaping or Juuling     Questions Responses    E-Cigarette Use Never User    Start Date     Does device contain nicotine? Never    Quit Date     E-Cigarette Type             Family History:      Reviewed in detail and negative for Cancer. Positive as follows:        Problem Relation Age of Onset    Coronary Art Dis Brother     Diabetes Father     Stroke Father     High Blood Pressure Father    Tessie Estrada Asthma Mother     Coronary Art Dis Mother     Diabetes Sister     Coronary Art Dis Sister        REVIEW OF SYSTEMS:   Pertinent positives as noted in the HPI. All other systems reviewed and negative. PHYSICAL EXAM PERFORMED:    /66   Pulse 99   Temp 98.1 °F (36.7 °C) (Oral)   Resp (!) 31   Ht 5' 8\" (1.727 m)   Wt 204 lb (92.5 kg)   SpO2 100%   BMI 31.02 kg/m²     General appearance:  No apparent distress, confused  HEENT:  Normal cephalic, atraumatic without obvious deformity. Pupils equal, round, and reactive to light.   Extra ocular muscles intact. Conjunctivae/corneas clear. Neck: Supple, with full range of motion. No jugular venous distention. Trachea midline. Respiratory:  Normal respiratory effort. Clear to auscultation, bilaterally without Rales/Wheezes/Rhonchi. Cardiovascular:  Regular rate and rhythm with normal S1/S2 without murmurs, rubs or gallops. Abdomen: Soft, non-tender, non-distended with normal bowel sounds. Musculoskeletal:  No clubbing, cyanosis. LE edema bilaterally. Full range of motion without deformity. Skin: Skin color, texture, turgor normal.  No rashes or lesions. Neurologic:  Neurovascularly intact without any focal sensory/motor deficits. Cranial nerves: II-XII intact, grossly non-focal.  Psychiatric:  Alert and oriented, thought content appropriate, normal insight  Capillary Refill: Brisk,< 3 seconds   Peripheral Pulses: +2 palpable, equal bilaterally       Labs:     Recent Labs     03/05/20  1505   WBC 20.3*   HGB 10.0*   HCT 30.8*        Recent Labs     03/05/20  1505   *   K 3.4*   CL 86*   CO2 32   BUN 57*   CREATININE 1.7*   CALCIUM 9.4     Recent Labs     03/05/20  1505   AST 24   ALT 12   BILITOT 0.3   ALKPHOS 89     No results for input(s): INR in the last 72 hours. Recent Labs     03/05/20  1505   TROPONINI 0.05*       Urinalysis:      Lab Results   Component Value Date    NITRU Negative 03/05/2020    WBCUA 21-50 03/05/2020    BACTERIA 2+ 03/05/2020    RBCUA 0-2 03/05/2020    BLOODU Negative 03/05/2020    SPECGRAV 1.010 03/05/2020    GLUCOSEU Negative 03/05/2020       Radiology:     CXR: I have reviewed the CXR with the following interpretation: none  EKG:  I have reviewed the EKG with the following interpretation: sinus tachycardia    CT Head WO Contrast   Final Result   No acute intracranial abnormality.       Diffuse atrophic changes with findings suggesting chronic microvascular   ischemia             ASSESSMENT:    Active Hospital Problems    Diagnosis Date Noted    UTI (urinary tract infection) [N39.0] 03/05/2020         PLAN:  Sepsis 2/2 UTI  - presented with tachycardia, leukocytosis. Lactate WNL  - UTI on UA. Urine culture pending  - started on ceftriaxone  - blood cultures ordered  - s/p IVF bolus    Acute metabolic encephalopathy  - 2/2 UTI  - CT head negative  - stop gabapentin for now  - supportive measures    CAD  - no active chest pain  - EKG negative. Trop mildly elevated chronically  - continue home ASA, statin, BB, ranexa    Chronic systolic heart failure  - appears euvolemic  - last echo 1/19 with EF 25%, global hypokinesis  - holding home torsemide for now  - continue BB. Not on ACE due to CKD    COPD with chronic hypoxic respiratory failure  - no evidence of exacerbation  - continue home inhalers, PRN nebs  - on home O2    CKD stage III  - Cr at baseline  - continue to monitor    DMII  - well controlled  - continue home insulin with SSI    HTN  - well controlled  - continue home coreg    HLD  - continue home statin    DVT Prophylaxis: eliquis  Diet: Diabetic diet  Code Status: Limited Code    PT/OT Eval Status: ordered    Dispo - at least two days       Dunia Unger MD    Thank you [phone] for the opportunity to be involved in this patient's care. If you have any questions or concerns please feel free to contact me at [phone].

## 2020-03-06 LAB
ANION GAP SERPL CALCULATED.3IONS-SCNC: 14 MMOL/L (ref 3–16)
BUN BLDV-MCNC: 52 MG/DL (ref 7–20)
CALCIUM SERPL-MCNC: 9.1 MG/DL (ref 8.3–10.6)
CHLORIDE BLD-SCNC: 91 MMOL/L (ref 99–110)
CO2: 33 MMOL/L (ref 21–32)
CREAT SERPL-MCNC: 1.6 MG/DL (ref 0.8–1.3)
ESTIMATED AVERAGE GLUCOSE: 142.7 MG/DL
GFR AFRICAN AMERICAN: 50
GFR NON-AFRICAN AMERICAN: 41
GLUCOSE BLD-MCNC: 110 MG/DL (ref 70–99)
GLUCOSE BLD-MCNC: 120 MG/DL (ref 70–99)
GLUCOSE BLD-MCNC: 137 MG/DL (ref 70–99)
GLUCOSE BLD-MCNC: 146 MG/DL (ref 70–99)
GLUCOSE BLD-MCNC: 175 MG/DL (ref 70–99)
HBA1C MFR BLD: 6.6 %
HCT VFR BLD CALC: 29.7 % (ref 40.5–52.5)
HEMOGLOBIN: 9.7 G/DL (ref 13.5–17.5)
MAGNESIUM: 1.9 MG/DL (ref 1.8–2.4)
MCH RBC QN AUTO: 30.1 PG (ref 26–34)
MCHC RBC AUTO-ENTMCNC: 32.5 G/DL (ref 31–36)
MCV RBC AUTO: 92.7 FL (ref 80–100)
PDW BLD-RTO: 18.4 % (ref 12.4–15.4)
PERFORMED ON: ABNORMAL
PLATELET # BLD: 220 K/UL (ref 135–450)
PMV BLD AUTO: 8.4 FL (ref 5–10.5)
POTASSIUM REFLEX MAGNESIUM: 3 MMOL/L (ref 3.5–5.1)
RBC # BLD: 3.2 M/UL (ref 4.2–5.9)
SODIUM BLD-SCNC: 138 MMOL/L (ref 136–145)
WBC # BLD: 17 K/UL (ref 4–11)

## 2020-03-06 PROCEDURE — 97535 SELF CARE MNGMENT TRAINING: CPT

## 2020-03-06 PROCEDURE — 6370000000 HC RX 637 (ALT 250 FOR IP): Performed by: INTERNAL MEDICINE

## 2020-03-06 PROCEDURE — 2700000000 HC OXYGEN THERAPY PER DAY

## 2020-03-06 PROCEDURE — 97530 THERAPEUTIC ACTIVITIES: CPT

## 2020-03-06 PROCEDURE — 94640 AIRWAY INHALATION TREATMENT: CPT

## 2020-03-06 PROCEDURE — 80048 BASIC METABOLIC PNL TOTAL CA: CPT

## 2020-03-06 PROCEDURE — 6360000002 HC RX W HCPCS: Performed by: INTERNAL MEDICINE

## 2020-03-06 PROCEDURE — 36415 COLL VENOUS BLD VENIPUNCTURE: CPT

## 2020-03-06 PROCEDURE — 85027 COMPLETE CBC AUTOMATED: CPT

## 2020-03-06 PROCEDURE — 6370000000 HC RX 637 (ALT 250 FOR IP): Performed by: NURSE PRACTITIONER

## 2020-03-06 PROCEDURE — 97162 PT EVAL MOD COMPLEX 30 MIN: CPT

## 2020-03-06 PROCEDURE — 1200000000 HC SEMI PRIVATE

## 2020-03-06 PROCEDURE — 2580000003 HC RX 258: Performed by: INTERNAL MEDICINE

## 2020-03-06 PROCEDURE — 97110 THERAPEUTIC EXERCISES: CPT

## 2020-03-06 PROCEDURE — 97116 GAIT TRAINING THERAPY: CPT

## 2020-03-06 PROCEDURE — 83735 ASSAY OF MAGNESIUM: CPT

## 2020-03-06 PROCEDURE — 97167 OT EVAL HIGH COMPLEX 60 MIN: CPT

## 2020-03-06 PROCEDURE — 94761 N-INVAS EAR/PLS OXIMETRY MLT: CPT

## 2020-03-06 RX ORDER — OXYCODONE HYDROCHLORIDE 5 MG/1
5 TABLET ORAL EVERY 4 HOURS PRN
Status: DISCONTINUED | OUTPATIENT
Start: 2020-03-06 | End: 2020-03-09 | Stop reason: HOSPADM

## 2020-03-06 RX ORDER — TORSEMIDE 100 MG/1
100 TABLET ORAL DAILY
Status: DISCONTINUED | OUTPATIENT
Start: 2020-03-07 | End: 2020-03-09 | Stop reason: HOSPADM

## 2020-03-06 RX ADMIN — APIXABAN 2.5 MG: 5 TABLET, FILM COATED ORAL at 21:55

## 2020-03-06 RX ADMIN — INSULIN LISPRO 2 UNITS: 100 INJECTION, SOLUTION INTRAVENOUS; SUBCUTANEOUS at 13:02

## 2020-03-06 RX ADMIN — COLLAGENASE SANTYL: 250 OINTMENT TOPICAL at 21:54

## 2020-03-06 RX ADMIN — ALLOPURINOL 300 MG: 300 TABLET ORAL at 08:56

## 2020-03-06 RX ADMIN — CEFTRIAXONE SODIUM 1 G: 1 INJECTION, POWDER, FOR SOLUTION INTRAMUSCULAR; INTRAVENOUS at 17:41

## 2020-03-06 RX ADMIN — RANOLAZINE 500 MG: 500 TABLET, FILM COATED, EXTENDED RELEASE ORAL at 21:55

## 2020-03-06 RX ADMIN — POTASSIUM CHLORIDE 10 MEQ: 750 TABLET, EXTENDED RELEASE ORAL at 08:56

## 2020-03-06 RX ADMIN — CALCITRIOL 0.25 MCG: 0.25 CAPSULE ORAL at 09:03

## 2020-03-06 RX ADMIN — INSULIN LISPRO 4 UNITS: 100 INJECTION, SOLUTION INTRAVENOUS; SUBCUTANEOUS at 09:05

## 2020-03-06 RX ADMIN — TRAZODONE HYDROCHLORIDE 100 MG: 50 TABLET ORAL at 22:00

## 2020-03-06 RX ADMIN — Medication 10 ML: at 08:58

## 2020-03-06 RX ADMIN — APIXABAN 2.5 MG: 5 TABLET, FILM COATED ORAL at 08:55

## 2020-03-06 RX ADMIN — RANOLAZINE 500 MG: 500 TABLET, FILM COATED, EXTENDED RELEASE ORAL at 08:56

## 2020-03-06 RX ADMIN — INSULIN LISPRO 4 UNITS: 100 INJECTION, SOLUTION INTRAVENOUS; SUBCUTANEOUS at 13:02

## 2020-03-06 RX ADMIN — CARVEDILOL 3.12 MG: 3.12 TABLET, FILM COATED ORAL at 21:55

## 2020-03-06 RX ADMIN — ACETAMINOPHEN 650 MG: 325 TABLET ORAL at 08:56

## 2020-03-06 RX ADMIN — Medication 2 PUFF: at 11:15

## 2020-03-06 RX ADMIN — METOCLOPRAMIDE 5 MG: 10 TABLET ORAL at 21:55

## 2020-03-06 RX ADMIN — Medication 3 MG: at 22:00

## 2020-03-06 RX ADMIN — Medication 2 PUFF: at 21:30

## 2020-03-06 RX ADMIN — ATORVASTATIN CALCIUM 20 MG: 10 TABLET, FILM COATED ORAL at 08:56

## 2020-03-06 RX ADMIN — Medication 10 ML: at 21:54

## 2020-03-06 RX ADMIN — OXYCODONE 5 MG: 5 TABLET ORAL at 11:35

## 2020-03-06 ASSESSMENT — PAIN SCALES - GENERAL
PAINLEVEL_OUTOF10: 4
PAINLEVEL_OUTOF10: 0
PAINLEVEL_OUTOF10: 10
PAINLEVEL_OUTOF10: 3
PAINLEVEL_OUTOF10: 0
PAINLEVEL_OUTOF10: 0
PAINLEVEL_OUTOF10: 9
PAINLEVEL_OUTOF10: 10

## 2020-03-06 NOTE — PROGRESS NOTES
Occupational Therapy   Occupational Therapy Initial Assessment and Treatment Note  Date: 3/6/2020   Patient Name: Miguel Dubose  MRN: [id number]     : 1935    Date of Service: 3/6/2020    Discharge Recommendations:  [address], Continue to assess pending progress  OT Equipment Recommendations  Equipment Needed: No  Other: defer to next level of care    Assessment   Performance deficits / Impairments: Decreased functional mobility ; Decreased balance;Decreased safe awareness;Decreased cognition;Decreased ADL status; Decreased endurance;Decreased strength;Decreased posture  Assessment: Pt unable to provide social/PLOF information, with information taken from chart review. Pt required Max A for functional transfers this date, and unable to safely attempt mobility d/t pt unable to progress feet and with poor standing balance/tolerance at RW. Believe pt could benefit from further therapy to increase endurance, and safety/independence in these areas prior to returning to LTC facility, as it appears pt is typically ambulatory. Continue to assess with OT tx. Prognosis: Fair  Decision Making: High Complexity  OT Education: OT Role;Plan of Care;ADL Adaptive Strategies;Transfer Training;Energy Conservation;Orientation;Equipment  Barriers to Learning: Cognition  REQUIRES OT FOLLOW UP: Yes  Activity Tolerance  Activity Tolerance: Treatment limited secondary to decreased cognition;Patient limited by fatigue  Safety Devices  Safety Devices in place: Yes  Type of devices: Left in bed;Call light within reach; Bed alarm in place;Gait belt;Nurse notified           Patient Diagnosis(es): The primary encounter diagnosis was Altered mental status, unspecified altered mental status type. A diagnosis of Urinary tract infection without hematuria, site unspecified was also pertinent to this visit.      has a past medical history of Acute MI ([facility] [address] Arthritis, CKD (chronic kidney disease), stage III ([address] COPD balance/tolerance  ADL  Feeding: Setup  Grooming: Minimal assistance;Setup;Verbal cueing(to wash face)  UE dressing: Max A, setup (to change gown)  LE Dressing: Maximum assistance;Setup(for socks)  Toileting: Dependent/Total(dutton)  Tone RUE  RUE Tone: Normotonic  Tone LUE  LUE Tone: Normotonic  Coordination  Movements Are Fluid And Coordinated: Yes     Bed mobility  Supine to Sit: Maximum assistance(HOB elevated, cues for sequencing)  Sit to Supine: Maximum assistance  Scooting: Maximal assistance;Dependent/Total(Max A x1 to EOB, Ax2 to [facility])  Transfers  Sit to stand: Maximum assistance  Stand to sit: Maximum assistance     Cognition  Overall Cognitive Status: Exceptions  Arousal/Alertness: Delayed responses to stimuli  Following Commands: Follows one step commands with increased time; Follows one step commands with repetition  Attention Span: Attends with cues to redirect; Difficulty dividing attention; Difficulty attending to directions  Memory: Decreased recall of recent events;Decreased short term memory;Decreased recall of biographical Information  Safety Judgement: Decreased awareness of need for safety;Decreased awareness of need for assistance  Problem Solving: Assistance required to generate solutions;Assistance required to implement solutions  Insights: Not aware of deficits  Initiation: Requires cues for all  Sequencing: Requires cues for some  Cognition Comment: constant cueing for safety.          Sensation  Overall Sensation Status: (difficult to accurately assess d/t pt with decreased attention/command following for assessment)  Exercises  Other: x10 ankle pumps, x10 seated marches, x5 long-arc quad     LUE AROM (degrees)  LUE General AROM: Shoulder flexion limited to ~90*; WFL distally  Left Hand AROM (degrees)  Left Hand AROM: WFL  RUE AROM (degrees)  RUE General AROM: Shoulder flexion limited to ~100* ; WFL distally  Right Hand AROM (degrees)  Right Hand AROM: WFL  LUE Strength  Gross LUE Strength: WFL  RUE Strength  Gross RUE Strength: WFL           Plan   Plan  Times per week: 3-5x/week   Current Treatment Recommendations: Strengthening, Balance Training, Functional Mobility Training, Endurance Training, Cognitive Reorientation, Patient/Caregiver Education & Training, Equipment Evaluation, Education, & procurement, Self-Care / ADL, Positioning, Safety Education & Training, Pain Management, Gait Training      AM-PAC Score        AM-PAC Inpatient Daily Activity Raw Score: 11 (03/06/20 1215)  AM-PAC Inpatient ADL T-Scale Score : 29.04 (03/06/20 1215)  ADL Inpatient CMS 0-100% Score: 70.42 (03/06/20 1215)  ADL Inpatient CMS G-Code Modifier : CL (03/06/20 1215)    Goals  Short term goals  Time Frame for Short term goals: 1 week (by 3/13/20)  Short term goal 1: Pt will complete functional transfers with Min A  Short term goal 2: Pt will complete LE dressing with Mod A   Short term goal 3: Pt will perform 2-3 grooming tasks seated with setup/verbal cues by 3/10  Patient Goals   Patient goals : Pt did not state goal at time of eval       Therapy Time   Individual Concurrent Group Co-treatment   Time In 0752(10 minutes for eval)         Time Out 0828         Minutes 36         Timed Code Treatment Minutes: 26 Minutes     This note to serve as d/c summary should pt d/c prior to next session.     Nida Umanzor, OTR/L

## 2020-03-06 NOTE — CARE COORDINATION
CASE MANAGEMENT INITIAL ASSESSMENT      Reviewed chart and met with patient today, re: potential discharge planning needs   Explained Case Management role/services. Family present: Spouse and son  Primary contact information: Spouse Klever Laurenter [phone]    Admit date/status: inpt 3/5/2020  Diagnosis: UTI  Is this a Readmission?: No  Insurance: [id number] required for SNF - Y        3 night stay required - N    Living arrangements, Adls, care needs, prior to admission: Pt was living at [place] [address] prior to admission. Transportation: Medical transport- family odes not verbalize any preference    Durable Medical Equipment at home:  Did not assess  Walker__Cane__RTS__ BSC__Shower Chair__  02__ HHN__ CPAP__  BiPap__  Hospital Bed__ W/C___ Other__________    Services in the home and/or outpatient, prior to admission: [facility]    PT[facility] recs: [facility] Exemption Notification (HEN): Will likely need    Barriers to discharge: N/A    Plan/comments: Lary met with pt and family and spoke with pt's daughter on the phone as well. Lary also confirmed with [facility] that pt came from that facility under long term care. Pt currently has PT/OT recs for SNF and will return skilled. A full precert needs to be completed and has been started as of now. LARY updated and informed all family members including patient. Lary will follow and assist as able.      ECOC on chart for MD signature

## 2020-03-06 NOTE — CONSULTS
[place] [place] [address] Continence Nurse  Consult Note       NAME:  Miguel Mathews  MEDICAL RECORD NUMBER:  [id number]  AGE: 81 y.o. GENDER: male  : 1935  TODAY'S DATE:  3/6/2020    Subjective: Per patient's spouse and son, had skin cancer removed from that area, it was almost healed   Reason for WOCN Evaluation and Assessment: Area on back that has some slough per the RN      Miguel Mathews is a 81 y.o. male referred by:   [] Physician  [x] Nursing  [] Other:     Wound Identification:  Wound Type: non-healing surgical  Contributing Factors: diabetes, chronic pressure and skin cancer    Wound History: 81 y.o. male who presented to [facility] with confusion. Patient was noted to be more confused at his SNF this AM. Patient was having visual hallucinations. Patient had his torsemide dose increased recently due to worsening LE edema. Facility denies any recent trauma. Patient has decreased mental capacity at baseline and is a limited historian.   Current Wound Care Treatment:  Mid Back - Santyl, foam dressing    Patient Goal of Care:  [x] Wound Healing  [] Odor Control  [] Palliative Care  [] Pain Control   [] Other:         PAST MEDICAL HISTORY        Diagnosis Date    [facility]     Arthritis     CKD (chronic kidney disease), stage III ([facility]     Reinier Palma MD, [facility], [phone]    COPD (chronic obstructive pulmonary disease) ([facility]     Diabetes mellitus ([facility]     Hyperlipidemia     Hypertension     Pacemaker        PAST SURGICAL HISTORY    Past Surgical History:   Procedure Laterality Date    ABDOMEN SURGERY  10/15/13    with j tube and lopez tube    BRONCHOSCOPY N/A 10/23/2019    BRONCHOSCOPY DIAGNOSTIC OR CELL [address] ONLY performed by Pietro Cuevas MD at [place] [address]  4/10/2013    Biventricular ICD Medtronic    CORONARY ARTERY BYPASS GRAFT      SKIN BIOPSY         FAMILY HISTORY    Family History   Problem Relation Age of Onset    Coronary Art Dis Brother     Diabetes Father     Stroke Father     High Blood Pressure Father     Asthma Mother     Coronary Art Dis Mother     Diabetes Sister     Coronary Art Dis Sister        SOCIAL HISTORY    Social History     Tobacco Use    Smoking status: Former Smoker     Packs/day: 2.00     Years: 60.00     Pack years: 120.00     Types: Cigarettes     Last attempt to quit: 10/14/2013     Years since quittin.3    Smokeless tobacco: Never Used   Substance Use Topics    Alcohol use: No    Drug use: No       ALLERGIES    Allergies   Allergen Reactions    Iv [Iodides]        MEDICATIONS    No current facility-administered medications on file prior to encounter. Current Outpatient Medications on File Prior to Encounter   Medication Sig Dispense Refill    ferrous sulfate 325 (65 Fe) MG tablet Take 325 mg by mouth 2 times daily      melatonin 3 MG TABS tablet Take 3 mg by mouth nightly as needed      metOLazone (ZAROXOLYN) 5 MG tablet Take 5 mg by mouth twice a week Monday and thursday      omeprazole (PRILOSEC) 40 MG delayed release capsule Take 40 mg by mouth daily      oxyCODONE (OXYCONTIN) 10 MG extended release tablet Take 10 mg by mouth every 12 hours.       sennosides-docusate sodium (SENOKOT-S) 8.6-50 MG tablet Take 1 tablet by mouth nightly      insulin glargine (LANTUS) 100 UNIT/ML injection vial Inject 18 Units into the skin nightly 1 vial 3    insulin lispro (HUMALOG) 100 UNIT/ML injection vial Inject 0-3 Units into the skin nightly 1 vial 3    insulin lispro (HUMALOG) 100 UNIT/ML injection vial Inject 4 Units into the skin 3 times daily (with meals) 1 vial 3    insulin lispro (HUMALOG) 100 UNIT/ML injection vial Inject 0-6 Units into the skin 3 times daily (with meals) 1 vial 3    torsemide (DEMADEX) 20 MG tablet Take 1 tablet by mouth daily (Patient taking differently: Take 100 mg by mouth daily ) 30 tablet 3    Multiple Vitamins-Minerals (THERAPEUTIC MULTIVITAMIN-MINERALS) Acute on chronic systolic congestive heart failure ([facility]) I50.23    Fatigue R53.83    PAF (paroxysmal atrial fibrillation) ([facility]) I48.0    GRACE (acute kidney injury) ([facility] N17.9    Encephalopathy G93.40    GRACE (acute kidney injury) ([facility] N17.9    Pneumonia J18.9    Gram-negative bacteremia R78.81    Leukocytosis D72.829    Cardiomyopathy ([facility]) C95.7    Diastolic dysfunction L19.03    DM (diabetes mellitus), secondary uncontrolled ([facility] E13.65    Back pain M54.9    UTI (urinary tract infection) N39.0       Measurements:  Wound 04/27/19 Back Lower;Medial (Active)   Number of days: 313       Wound 04/27/19 Coccyx Mid;Lower excoriation (Active)   Number of days: 313       Wound 04/27/19 Leg Left;Posterior; Lower (Active)   Number of days: 313       Wound 10/22/19 Back Mid (Active)   Wound Image   3/6/2020  6:18 PM   Wound Non-Healing Surgical 3/6/2020  6:18 PM   Dressing Status Intact; New drainage 3/6/2020  6:18 PM   Dressing Changed Changed/New 3/6/2020  6:18 PM   Dressing/Treatment Pharmaceutical agent (see MAR); Foam 3/6/2020  6:18 PM   Wound Cleansed Rinsed/Irrigated with saline 3/6/2020  6:18 PM   Dressing Change Due 03/06/20 3/6/2020  6:18 PM   Wound Length (cm) 1.5 cm 3/6/2020  6:18 PM   Wound Width (cm) 1.5 cm 3/6/2020  6:18 PM   Wound Depth (cm) 0.2 cm 3/6/2020  6:18 PM   Wound Surface Area (cm^2) 2.25 cm^2 3/6/2020  6:18 PM   Wound Volume (cm^3) 0.45 cm^3 3/6/2020  6:18 PM   Wound Assessment Pink;Slough; Yellow 3/6/2020  6:18 PM   Drainage Amount Scant 3/6/2020  6:18 PM   Drainage Description Serosanguinous 3/6/2020  6:18 PM   Odor None 3/6/2020  6:18 PM   Margins Unattached edges 3/6/2020  6:18 PM   Sarahy-wound Assessment Dry; Intact 3/6/2020  6:18 PM   Yellow%Wound Bed 100 3/6/2020  6:18 PM   Culture Taken No 3/6/2020  6:18 PM   Number of days: 136    Mid Back:         Wound 10/22/19 Sacrum Inner (Active)   Number of days: 136            Response to treatment:  Well tolerated by patient.      Pain Assessment:  Severity:  0 / 10  Quality of pain: N/A  Wound Pain Timing/Severity: none  Premedicated: No    Plan   Plan of Care: Wound 10/22/19 Back Mid-Dressing/Treatment: Pharmaceutical agent (see MAR), Foam(Santyl)   Recommendation: Mid Back - clean with NSS; pat dry; apply nickel thick Santyl to wound bed; cover with foam dressing; change daily  Call Wound Care for deterioration [phone]; pager [phone]    Specialty Bed Required : No   [] Low Air Loss   [] Pressure Redistribution  [] Fluid Immersion  [] Bariatric  [] Total Pressure Relief  [] Other:     Current Diet: DIET CARB CONTROL;   Dietician consult:  No    Discharge Plan:  Placement for patient upon discharge: intermediate care facility    Patient appropriate for Outpatient [address] [facility]: No    Referrals:  [x]  following  [] [facility]  [] Supplies  [] Other    Patient/Caregiver Teaching:  Level of patient/caregiver understanding able to:   [] Indicates understanding       [] Needs reinforcement  [] Unsuccessful      [] Verbal Understanding  [] Demonstrated understanding       [] No evidence of learning  [] Refused teaching         [] N/A       Electronically signed by Lexy Rodriguez RN, Luis Dinh on 3/6/2020 at 6:21 PM

## 2020-03-06 NOTE — PROGRESS NOTES
SURGERY  4/10/2013    Biventricular ICD Medtronic    CORONARY ARTERY BYPASS GRAFT      SKIN BIOPSY         Level of Consciousness: Alert, Follows Commands but Disoriented = 1    Level of Activity: Mostly sedentary, minimal walking = 2    Respiratory Pattern: Regular Pattern; RR 8-20 = 0    Breath Sounds: Diminshed bilaterally and/or crackles = 2    Sputum   ,  ,    Cough: Strong, spontaneous, non-productive = 0    Vital Signs   /61   Pulse 99   Temp 98.1 °F (36.7 °C) (Oral)   Resp 16   Ht 5' 8\" (1.727 m)   Wt 204 lb (92.5 kg)   SpO2 98%   BMI 31.02 kg/m²   SPO2 (COPD values may differ): 90-91% on room air or greater than 92% on FiO2 24- 28% = 1    Peak Flow (asthma only): not applicable = 0    RSI: 7-8 = BID and Q4HPRN (every four hours as needed) for dyspnea        Plan       Goals: medication delivery, mobilize retained secretions, volume expansion and improve oxygenation    Patient/caregiver was educated on the proper method of use for Respiratory Care Devices:  Yes      Level of patient/caregiver understanding able to:   ? Verbalize understanding   ? Demonstrate understanding       ? Teach back        ? Needs reinforcement       ? No available caregiver               ? Other:     Response to education:  Good     Is patient being placed on Home Treatment Regimen? Yes     Does the patient have everything they need prior to discharge? NA     Comments: Evaluated pt and reviewed chart. Plan of Care: Duoneb HHN Q4 PRN, Symbicort BID    Electronically signed by Agnes Cortez RCP on 3/5/2020 at 7:59 PM    Respiratory Protocol Guidelines     1. Assessment and treatment by Respiratory Therapy will be initiated for medication and therapeutic interventions upon initiation of aerosolized medication. 2. Physician will be contacted for respiratory rate (RR) greater than 35 breaths per minute.  Therapy will be held for heart rate (HR) greater than 140 beats per minute, pending direction from physician. 3. Bronchodilators will be administered via Metered Dose Inhaler (MDI) with spacer when the following criteria are met:  a. Alert and cooperative     b. HR < 140 bpm  c. RR < 30 bpm                d. Can demonstrate a 2-3 second inspiratory hold  4. Bronchodilators will be administered via Hand Held Nebulizer C[facility]) to patients when ANY of the following criteria are met  a. Incognizant or uncooperative          b. Patients treated with HHN at Home        c. Unable to demonstrate proper use of MDI with spacer     d. RR > 30 bpm   5. Bronchodilators will be delivered via Metered Dose Inhaler (MDI), HHN, Aerogen to intubated patients on mechanical ventilation. 6. Inhalation medication orders will be delivered and/or substituted as outlined below. Aerosolized Medications Ordering and Administration Guidelines:    1. All Medications will be ordered by a physician, and their frequency and/or modality will be adjusted as defined by the patients Respiratory Severity Index (RSI) score. 2. If the patient does not have documented COPD, consider discontinuing anticholinergics when RSI is less than 9.  3. If the bronchospasm worsens (increased RSI), then the bronchodilator frequency can be increased to a maximum of every 4 hours. If greater than every 4 hours is required, the physician will be contacted. 4. If the bronchospasm improves, the frequency of the bronchodilator can be decreased, based on the patient's RSI, but not less than home treatment regimen frequency. 5. Bronchodilator(s) will be discontinued if patient has a RSI less than 9 and has received no scheduled or as needed treatment for 72  Hrs. Patients Ordered on a Mucolytic Agent:    1. Must always be administered with a bronchodilator. 2. Discontinue if patient experiences worsened bronchospasm, or secretions have lessened to the point that the patient is able to clear them with a cough.     Anti-inflammatory and Combination

## 2020-03-06 NOTE — PROGRESS NOTES
Hospitalist Progress Note      PCP: Kimberli Calderon    Date of Admission: 3/5/2020    Chief Complaint: confusion    Hospital Course:   81 y.o. male who presented to [facility] with confusion. Patient was noted to be more confused at his SNF this AM. Patient was having visual hallucinations. Patient had his torsemide dose increased recently due to worsening LE edema. Facility denies any recent trauma. Patient has decreased mental capacity at baseline and is a limited historian. Subjective: still having hallucinations. Sepsis improving. No new complaints.        Medications:  Reviewed    Infusion Medications    dextrose       Scheduled Medications    allopurinol  300 mg Oral Daily    apixaban  2.5 mg Oral BID    atorvastatin  20 mg Oral Daily    budesonide-formoterol  2 puff Inhalation BID    calcitRIOL  0.25 mcg Oral Q MWF    insulin glargine  18 Units Subcutaneous Nightly    insulin lispro  4 Units Subcutaneous TID WC    polyethylene glycol  17 g Oral Every Other Day    potassium chloride  10 mEq Oral Daily    ranolazine  500 mg Oral BID    traZODone  100 mg Oral Nightly    sodium chloride flush  10 mL Intravenous 2 times per day    cefTRIAXone (ROCEPHIN) IV  1 g Intravenous Q24H    carvedilol  3.125 mg Oral BID    insulin lispro  0-12 Units Subcutaneous TID WC    insulin lispro  0-6 Units Subcutaneous Nightly    metoclopramide  5 mg Oral Nightly     PRN Meds: oxyCODONE, sodium chloride flush, acetaminophen **OR** acetaminophen, polyethylene glycol, promethazine **OR** [DISCONTINUED] ondansetron, glucose, dextrose, glucagon (rDNA), dextrose, ipratropium-albuterol, melatonin ER      Intake/Output Summary (Last 24 hours) at 3/6/2020 0919  Last data filed at 3/6/2020 0438  Gross per 24 hour   Intake --   Output 700 ml   Net -700 ml       Physical Exam Performed:    BP (!) 105/57 Comment: told Jumana  Pulse 93   Temp 97 °F (36.1 °C) (Oral)   Resp 16   Ht 5' 8\" (1.727 m)   Wt 204 lb (92.5 kg)   SpO2 93%   BMI 31.02 kg/m²     General appearance: No apparent distress, appears stated age and cooperative. HEENT: Pupils equal, round, and reactive to light. Conjunctivae/corneas clear. Neck: Supple, with full range of motion. No jugular venous distention. Trachea midline. Respiratory:  Normal respiratory effort. Clear to auscultation, bilaterally without Rales/Wheezes/Rhonchi. Cardiovascular: Regular rate and rhythm with normal S1/S2 without murmurs, rubs or gallops. Abdomen: Soft, non-tender, non-distended with normal bowel sounds. Musculoskeletal: No clubbing, cyanosis or edema bilaterally. Full range of motion without deformity. Skin: Skin color, texture, turgor normal.  No rashes or lesions. Neurologic:  Neurovascularly intact without any focal sensory/motor deficits. Cranial nerves: II-XII intact, grossly non-focal.  Psychiatric: Alert and oriented, thought content appropriate, normal insight  Capillary Refill: Brisk,< 3 seconds   Peripheral Pulses: +2 palpable, equal bilaterally       Labs:   Recent Labs     03/05/20  1505 03/06/20  0803   WBC 20.3* 17.0*   HGB 10.0* 9.7*   HCT 30.8* 29.7*    220     Recent Labs     03/05/20  1505 03/06/20  0803   * 138   K 3.4* 3.0*   CL 86* 91*   CO2 32 33*   BUN 57* 52*   CREATININE 1.7* 1.6*   CALCIUM 9.4 9.1     Recent Labs     03/05/20  1505   AST 24   ALT 12   BILITOT 0.3   ALKPHOS 89     No results for input(s): INR in the last 72 hours. Recent Labs     03/05/20  1505   TROPONINI 0.05*       Urinalysis:      Lab Results   Component Value Date    NITRU Negative 03/05/2020    WBCUA 21-50 03/05/2020    BACTERIA 2+ 03/05/2020    RBCUA 0-2 03/05/2020    BLOODU Negative 03/05/2020    SPECGRAV 1.010 03/05/2020    GLUCOSEU Negative 03/05/2020       Radiology:  CT Head WO Contrast   Final Result   No acute intracranial abnormality.       Diffuse atrophic changes with findings suggesting chronic microvascular   ischemia Assessment/Plan:    Active Hospital Problems    Diagnosis    UTI (urinary tract infection) [N39.0]     Sepsis 2/2 UTI  - presented with tachycardia, leukocytosis. Lactate WNL  - UTI on UA. Urine culture pending  - started on ceftriaxone  - blood cultures ordered  - s/p IVF bolus     Acute metabolic encephalopathy  - 2/2 UTI  - CT head negative  - stop gabapentin for now  - supportive measures     CAD  - no active chest pain  - EKG negative. Trop mildly elevated chronically  - continue home ASA, statin, BB, ranexa     Chronic systolic heart failure  - appears euvolemic  - last echo 1/19 with EF 25%, global hypokinesis  - holding home torsemide, will plan to restart tomorrow  - continue BB. Not on ACE due to CKD     COPD with chronic hypoxic respiratory failure  - no evidence of exacerbation  - continue home inhalers, PRN nebs  - on home O2     Paroxysmal Afib  - paced rhythm  - continue home metoprolol, eliquis    CKD stage III  - Cr at baseline  - continue to monitor     DMII  - well controlled  - continue home insulin with SSI     HTN  - well controlled  - continue home coreg     HLD  - continue home statin    DVT Prophylaxis: eliquis  Diet: DIET CARB CONTROL;  Code Status: Limited    PT/OT Eval Status: ordered    Dispo - likely 1-2 days.  Plan to return to facility at discharge    Mic Bhandari MD

## 2020-03-06 NOTE — PROGRESS NOTES
Pt has been in room talking to the things he sees on the walls all night. Is known to have hallucinations that started today due to UTI. States \"theres rats by the TV\" and is telling \"Ainsley\" to wake up and \"theres no way you sleep that hard\". Pt has been reoriented but continues to see things. Was taking of tele every 5 minutes so that has been discontinued. Pt is currently refusing to wear a gown. Will continue to monitor.

## 2020-03-06 NOTE — PROGRESS NOTES
4 Eyes Skin Assessment     The patient is being assess for  Admission    I agree that 2 RN's have performed a thorough Head to Toe Skin Assessment on the patient. ALL assessment sites listed below have been assessed. Areas assessed by both nurses:   [x]   Head, Face, and Ears   [x]   Shoulders, Back, and Chest  [x]   Arms, Elbows, and Hands   [x]   Coccyx, Sacrum, and IschIum  [x]   Legs, Feet, and Heels        Does the Patient have Skin Breakdown?   Yes [facility] WOUND CARE was Initiated documentation include the Sarahy-wound, Wound Assessment, Measurements, Dressing Treatment, Drainage, and Color\",         Troy Prevention initiated:  Yes   Wound Care Orders initiated:  Yes      [id number]  nurse consulted for Pressure Injury (Stage 3,4, Unstageable, DTI, NWPT, and Complex wounds), New and Established Ostomies:  Yes      Nurse 1 eSignature: Electronically signed by Juan Carlos Machuca RN on 3/6/20 at 12:53 AM    **SHARE this note so that the co-signing nurse is able to place an eSignature**    Nurse 2 eSignature: Electronically signed by Angelita Huang RN on 3/6/20 at 1:02 AM

## 2020-03-06 NOTE — PROGRESS NOTES
place; Disoriented to time;Oriented to person(The year is 2097.)  Social/Functional History  Social/Functional History  Type of Home: Facility(Per chart patient was admitted from a nursing facility. )  Home Layout: One level  Home Access: Level entry  Home Equipment: 4 wheeled walker  ADL Assistance: Independent(per patient however patient is confused)  Homemaking Assistance: Independent  Ambulation Assistance: Needs assistance(\"I normally walk a little bit but my wife helps me. \")  Transfer Assistance: Independent(per patient)  Active : No  Patient's  Info: daughter  Occupation: Retired  Type of occupation:   Additional Comments: Patient is an extremely poor historian. Per chart review patient was living at home during previous therapy assessment on 4/28/19. However patient was admitted from a nursing facility. Cognition   Cognition  Overall Cognitive Status: Exceptions  Arousal/Alertness: Delayed responses to stimuli  Following Commands: Follows one step commands with repetition; Follows one step commands with increased time  Attention Span: Difficulty attending to directions  Memory: Decreased recall of recent events;Decreased short term memory;Decreased recall of precautions  Safety Judgement: Decreased awareness of need for safety;Decreased awareness of need for assistance  Problem Solving: Assistance required to implement solutions;Assistance required to identify errors made;Decreased awareness of errors;Good awareness of errors made;Assistance required to generate solutions;Assistance required to correct errors made  Insights: Not aware of deficits  Initiation: Requires cues for some  Sequencing: Requires cues for some  Cognition Comment: constant cueing for safety.      Objective     Observation/Palpation  Posture: Fair  Observation: increased kyphosis    PROM RLE (degrees)  RLE PROM: WFL  AROM RLE (degrees)  RLE AROM: WFL  PROM LLE (degrees)  LLE PROM: WFL  AROM LLE (degrees)  LLE AROM : WFL  Strength RLE  Strength RLE: Exception  Comment: grossly 3+/5 strength bilaterally  knee/hip/ankle strength  Strength LLE  Strength LLE: Exception  Comment: grossly 3+/5 strength bilaterally knee/hip/ankle strength  Motor Control  Gross Motor?: Exceptions  Comments: decreased bilateral lower extremity strength  Sensation  Overall Sensation Status: WNL  Bed mobility  Rolling to Left: Maximum assistance  Rolling to Right: Maximum assistance  Supine to Sit: Maximum assistance  Sit to Supine: Maximum assistance  Scooting: Maximal assistance  Transfers  Sit to Stand: Maximum Assistance  Stand to sit: Maximum Assistance  Bed to Chair: Unable to assess  Comment: patient assisted back to bed at end of session due to patient's confusion and impusivity. Ambulation  Ambulation?: Yes  More Ambulation?: No  Ambulation 1  Surface: level tile  Device: Rolling Walker  Assistance: Maximum assistance  Quality of Gait: forward flexed posture (cueing to correct with little success) decreased gait velocity, step length. wide base of support. shuffle gait pattern. Gait Deviations: Decreased step length;Decreased step height;Shuffles; Slow Leatha; Increased OLIVIA; Decreased arm swing;Decreased head and trunk rotation  Distance: 3 side steps to the right to the head of the bed. Further ambulation not attempted due to patient's loss of balance, confusion. Comments: No complaints of shortness of breath, chest pain or dizziness. 1 loss of balance. max assist to correct. Stairs/Curb  Stairs?: No(not safe to attempt. )     Balance  Posture: Poor  Sitting - Static: Poor  Sitting - Dynamic: Poor  Standing - Static: Poor  Standing - Dynamic: Poor  Comments: with rolling walker  Exercises  Straight Leg Raise: 1 x 10 AROM  Heelslides: 1 x 10 AROM  Hip Flexion: 1 x 10 AROM  Hip Abduction: 1 x 10 AROM  Knee Long Arc Quad: 1 x 10 AROM  Knee Short Arc Quad: 1 x 10 AROM  Ankle Pumps: 1 x 10 AROM  Comments: cueing for sequencing and technique. Other exercises  Other exercises?: No     Plan   Plan  Times per week: 3-5/ week  Plan weeks: 1 week 3/13/20  Specific instructions for Next Treatment: progress mobility as tolerated. Current Treatment Recommendations: Strengthening, Home Exercise Program, Equipment Evaluation, Education, & procurement, ROM, Transfer Training, Gait Training, Safety Education & Training, Balance Training, Endurance Training, Patient/Caregiver Education & Training, Pain Management  Safety Devices  Type of devices: Gait belt, All fall risk precautions in place, Bed alarm in place, Telesitter in use, Call light within reach, Left in bed, Nurse notified, Patient at risk for falls    AM-PAC Score     AM-PAC Inpatient Mobility without Stair Climbing Raw Score : 8 (03/06/20 1029)  AM-PAC Inpatient without Stair Climbing T-Scale Score : 30.65 (03/06/20 1029)  Mobility Inpatient CMS 0-100% Score: 80.91 (03/06/20 1029)  Mobility Inpatient without Stair CMS G-Code Modifier : CM (03/06/20 1029)       Goals  Short term goals  Time Frame for Short term goals: 1 week 3/13/20  Short term goal 1: Supine <> sit with minimum assistance. Short term goal 2: Sit <> stand with minimum assistance. Short term goal 3: Bed <> chair with moderate assistance. Short term goal 4: Ambulate 10 feet with LRAD and moderate assistance. Patient Goals   Patient goals : No goals stated other than wanting to go home.         Therapy Time   Individual Concurrent Group Co-treatment   Time In 0945         Time Out 1019         Minutes 34         Timed Code Treatment Minutes: 24 Minutes(10 minute evaluation)       Torres Gabriel, PT

## 2020-03-07 LAB
ANION GAP SERPL CALCULATED.3IONS-SCNC: 14 MMOL/L (ref 3–16)
BASOPHILS ABSOLUTE: 0.1 K/UL (ref 0–0.2)
BASOPHILS RELATIVE PERCENT: 0.5 %
BUN BLDV-MCNC: 47 MG/DL (ref 7–20)
CALCIUM SERPL-MCNC: 9.3 MG/DL (ref 8.3–10.6)
CHLORIDE BLD-SCNC: 90 MMOL/L (ref 99–110)
CO2: 33 MMOL/L (ref 21–32)
CREAT SERPL-MCNC: 1.6 MG/DL (ref 0.8–1.3)
EOSINOPHILS ABSOLUTE: 0.2 K/UL (ref 0–0.6)
EOSINOPHILS RELATIVE PERCENT: 1.5 %
GFR AFRICAN AMERICAN: 50
GFR NON-AFRICAN AMERICAN: 41
GLUCOSE BLD-MCNC: 131 MG/DL (ref 70–99)
GLUCOSE BLD-MCNC: 187 MG/DL (ref 70–99)
GLUCOSE BLD-MCNC: 253 MG/DL (ref 70–99)
GLUCOSE BLD-MCNC: 270 MG/DL (ref 70–99)
GLUCOSE BLD-MCNC: 278 MG/DL (ref 70–99)
HCT VFR BLD CALC: 30 % (ref 40.5–52.5)
HEMOGLOBIN: 10 G/DL (ref 13.5–17.5)
LYMPHOCYTES ABSOLUTE: 1 K/UL (ref 1–5.1)
LYMPHOCYTES RELATIVE PERCENT: 9.2 %
MCH RBC QN AUTO: 30.8 PG (ref 26–34)
MCHC RBC AUTO-ENTMCNC: 33.3 G/DL (ref 31–36)
MCV RBC AUTO: 92.5 FL (ref 80–100)
MONOCYTES ABSOLUTE: 1 K/UL (ref 0–1.3)
MONOCYTES RELATIVE PERCENT: 9.2 %
NEUTROPHILS ABSOLUTE: 8.7 K/UL (ref 1.7–7.7)
NEUTROPHILS RELATIVE PERCENT: 79.6 %
PDW BLD-RTO: 18.4 % (ref 12.4–15.4)
PERFORMED ON: ABNORMAL
PLATELET # BLD: 221 K/UL (ref 135–450)
PMV BLD AUTO: 7.9 FL (ref 5–10.5)
POTASSIUM SERPL-SCNC: 3.4 MMOL/L (ref 3.5–5.1)
RBC # BLD: 3.24 M/UL (ref 4.2–5.9)
SODIUM BLD-SCNC: 137 MMOL/L (ref 136–145)
WBC # BLD: 10.9 K/UL (ref 4–11)

## 2020-03-07 PROCEDURE — 80048 BASIC METABOLIC PNL TOTAL CA: CPT

## 2020-03-07 PROCEDURE — 6360000002 HC RX W HCPCS: Performed by: INTERNAL MEDICINE

## 2020-03-07 PROCEDURE — 6370000000 HC RX 637 (ALT 250 FOR IP): Performed by: NURSE PRACTITIONER

## 2020-03-07 PROCEDURE — 94640 AIRWAY INHALATION TREATMENT: CPT

## 2020-03-07 PROCEDURE — 2700000000 HC OXYGEN THERAPY PER DAY

## 2020-03-07 PROCEDURE — 94761 N-INVAS EAR/PLS OXIMETRY MLT: CPT

## 2020-03-07 PROCEDURE — 85025 COMPLETE CBC W/AUTO DIFF WBC: CPT

## 2020-03-07 PROCEDURE — 36415 COLL VENOUS BLD VENIPUNCTURE: CPT

## 2020-03-07 PROCEDURE — 2580000003 HC RX 258: Performed by: INTERNAL MEDICINE

## 2020-03-07 PROCEDURE — 1200000000 HC SEMI PRIVATE

## 2020-03-07 PROCEDURE — 6370000000 HC RX 637 (ALT 250 FOR IP): Performed by: INTERNAL MEDICINE

## 2020-03-07 RX ORDER — POTASSIUM CHLORIDE 20 MEQ/1
40 TABLET, EXTENDED RELEASE ORAL 2 TIMES DAILY
Status: COMPLETED | OUTPATIENT
Start: 2020-03-07 | End: 2020-03-07

## 2020-03-07 RX ADMIN — TRAZODONE HYDROCHLORIDE 100 MG: 50 TABLET ORAL at 21:27

## 2020-03-07 RX ADMIN — CARVEDILOL 3.12 MG: 3.12 TABLET, FILM COATED ORAL at 21:27

## 2020-03-07 RX ADMIN — INSULIN GLARGINE 18 UNITS: 100 INJECTION, SOLUTION SUBCUTANEOUS at 21:28

## 2020-03-07 RX ADMIN — CARVEDILOL 3.12 MG: 3.12 TABLET, FILM COATED ORAL at 09:19

## 2020-03-07 RX ADMIN — Medication 10 ML: at 21:27

## 2020-03-07 RX ADMIN — APIXABAN 2.5 MG: 5 TABLET, FILM COATED ORAL at 21:27

## 2020-03-07 RX ADMIN — INSULIN LISPRO 6 UNITS: 100 INJECTION, SOLUTION INTRAVENOUS; SUBCUTANEOUS at 14:01

## 2020-03-07 RX ADMIN — POTASSIUM CHLORIDE 40 MEQ: 20 TABLET, EXTENDED RELEASE ORAL at 21:27

## 2020-03-07 RX ADMIN — ATORVASTATIN CALCIUM 20 MG: 10 TABLET, FILM COATED ORAL at 09:19

## 2020-03-07 RX ADMIN — RANOLAZINE 500 MG: 500 TABLET, FILM COATED, EXTENDED RELEASE ORAL at 09:19

## 2020-03-07 RX ADMIN — APIXABAN 2.5 MG: 5 TABLET, FILM COATED ORAL at 09:19

## 2020-03-07 RX ADMIN — Medication 10 ML: at 11:15

## 2020-03-07 RX ADMIN — ALLOPURINOL 300 MG: 300 TABLET ORAL at 09:19

## 2020-03-07 RX ADMIN — METOCLOPRAMIDE 5 MG: 10 TABLET ORAL at 21:27

## 2020-03-07 RX ADMIN — CEFTRIAXONE SODIUM 1 G: 1 INJECTION, POWDER, FOR SOLUTION INTRAMUSCULAR; INTRAVENOUS at 17:34

## 2020-03-07 RX ADMIN — Medication 2 PUFF: at 19:10

## 2020-03-07 RX ADMIN — Medication 2 PUFF: at 09:03

## 2020-03-07 RX ADMIN — INSULIN LISPRO 2 UNITS: 100 INJECTION, SOLUTION INTRAVENOUS; SUBCUTANEOUS at 17:35

## 2020-03-07 RX ADMIN — INSULIN LISPRO 4 UNITS: 100 INJECTION, SOLUTION INTRAVENOUS; SUBCUTANEOUS at 14:02

## 2020-03-07 RX ADMIN — INSULIN LISPRO 3 UNITS: 100 INJECTION, SOLUTION INTRAVENOUS; SUBCUTANEOUS at 21:28

## 2020-03-07 RX ADMIN — OXYCODONE 5 MG: 5 TABLET ORAL at 17:34

## 2020-03-07 RX ADMIN — POLYETHYLENE GLYCOL 3350 17 G: 17 POWDER, FOR SOLUTION ORAL at 09:19

## 2020-03-07 RX ADMIN — POTASSIUM CHLORIDE 10 MEQ: 750 TABLET, EXTENDED RELEASE ORAL at 09:18

## 2020-03-07 RX ADMIN — ACETAMINOPHEN 650 MG: 325 TABLET ORAL at 16:26

## 2020-03-07 RX ADMIN — TORSEMIDE 100 MG: 100 TABLET ORAL at 09:19

## 2020-03-07 RX ADMIN — COLLAGENASE SANTYL: 250 OINTMENT TOPICAL at 09:30

## 2020-03-07 RX ADMIN — OXYCODONE 5 MG: 5 TABLET ORAL at 21:37

## 2020-03-07 RX ADMIN — INSULIN LISPRO 4 UNITS: 100 INJECTION, SOLUTION INTRAVENOUS; SUBCUTANEOUS at 17:36

## 2020-03-07 RX ADMIN — POTASSIUM CHLORIDE 40 MEQ: 20 TABLET, EXTENDED RELEASE ORAL at 14:01

## 2020-03-07 RX ADMIN — INSULIN LISPRO 4 UNITS: 100 INJECTION, SOLUTION INTRAVENOUS; SUBCUTANEOUS at 09:20

## 2020-03-07 RX ADMIN — OXYCODONE 5 MG: 5 TABLET ORAL at 14:01

## 2020-03-07 RX ADMIN — RANOLAZINE 500 MG: 500 TABLET, FILM COATED, EXTENDED RELEASE ORAL at 21:27

## 2020-03-07 ASSESSMENT — PAIN SCALES - GENERAL
PAINLEVEL_OUTOF10: 10
PAINLEVEL_OUTOF10: 10
PAINLEVEL_OUTOF10: 5
PAINLEVEL_OUTOF10: 0
PAINLEVEL_OUTOF10: 5
PAINLEVEL_OUTOF10: 10

## 2020-03-07 ASSESSMENT — PAIN DESCRIPTION - DESCRIPTORS: DESCRIPTORS: ACHING

## 2020-03-07 ASSESSMENT — PAIN DESCRIPTION - LOCATION
LOCATION: BACK
LOCATION: BACK

## 2020-03-07 ASSESSMENT — PAIN DESCRIPTION - PAIN TYPE
TYPE: CHRONIC PAIN
TYPE: CHRONIC PAIN

## 2020-03-07 ASSESSMENT — PAIN DESCRIPTION - PROGRESSION: CLINICAL_PROGRESSION: NOT CHANGED

## 2020-03-07 ASSESSMENT — PAIN DESCRIPTION - ORIENTATION: ORIENTATION: LOWER

## 2020-03-07 ASSESSMENT — PAIN DESCRIPTION - FREQUENCY: FREQUENCY: CONTINUOUS

## 2020-03-07 ASSESSMENT — PAIN DESCRIPTION - ONSET: ONSET: ON-GOING

## 2020-03-07 NOTE — PROGRESS NOTES
Hospitalist Progress Note      PCP: Clinton Tucker    Date of Admission: 3/5/2020    Chief Complaint: confusion    Hospital Course:   81 y.o. male who presented to [facility] with confusion. Patient was noted to be more confused at his SNF this AM. Patient was having visual hallucinations. Patient had his torsemide dose increased recently due to worsening LE edema. Facility denies any recent trauma. Patient has decreased mental capacity at baseline and is a limited historian. Subjective:   still confused. Sepsis improving. No new complaints.        Medications:  Reviewed    Infusion Medications    dextrose       Scheduled Medications    torsemide  100 mg Oral Daily    collagenase   Topical Daily    allopurinol  300 mg Oral Daily    apixaban  2.5 mg Oral BID    atorvastatin  20 mg Oral Daily    budesonide-formoterol  2 puff Inhalation BID    calcitRIOL  0.25 mcg Oral Q MWF    insulin glargine  18 Units Subcutaneous Nightly    insulin lispro  4 Units Subcutaneous TID WC    polyethylene glycol  17 g Oral Every Other Day    potassium chloride  10 mEq Oral Daily    ranolazine  500 mg Oral BID    traZODone  100 mg Oral Nightly    sodium chloride flush  10 mL Intravenous 2 times per day    cefTRIAXone (ROCEPHIN) IV  1 g Intravenous Q24H    carvedilol  3.125 mg Oral BID    insulin lispro  0-12 Units Subcutaneous TID WC    insulin lispro  0-6 Units Subcutaneous Nightly    metoclopramide  5 mg Oral Nightly     PRN Meds: oxyCODONE, sodium chloride flush, acetaminophen **OR** acetaminophen, polyethylene glycol, promethazine **OR** [DISCONTINUED] ondansetron, glucose, dextrose, glucagon (rDNA), dextrose, ipratropium-albuterol, melatonin ER      Intake/Output Summary (Last 24 hours) at 3/7/2020 0993  Last data filed at 3/7/2020 0416  Gross per 24 hour   Intake 480 ml   Output 1330 ml   Net -850 ml       Physical Exam Performed:    /67   Pulse 87   Temp 97.5 °F (36.4 °C) (Oral) Resp 18   Ht 5' 8\" (1.727 m)   Wt 221 lb 9 oz (100.5 kg)   SpO2 99%   BMI 33.69 kg/m²     General appearance: No apparent distress, appears stated age and cooperative. HEENT: Pupils equal, round, and reactive to light. Conjunctivae/corneas clear. Neck: Supple, with full range of motion. No jugular venous distention. Trachea midline. Respiratory:  Normal respiratory effort. Clear to auscultation, bilaterally without Rales/Wheezes/Rhonchi. Cardiovascular: Regular rate and rhythm with normal S1/S2 without murmurs, rubs or gallops. Abdomen: Soft, non-tender, non-distended with normal bowel sounds. Musculoskeletal: No clubbing, cyanosis or edema bilaterally. Full range of motion without deformity. Skin: Skin color, texture, turgor normal.  No rashes or lesions. Neurologic:  Neurovascularly intact without any focal sensory/motor deficits. Cranial nerves: II-XII intact, grossly non-focal.  Psychiatric: Alert and oriented, thought content appropriate, normal insight  Capillary Refill: Brisk,< 3 seconds   Peripheral Pulses: +2 palpable, equal bilaterally       Labs:   Recent Labs     03/05/20  1505 03/06/20  0803   WBC 20.3* 17.0*   HGB 10.0* 9.7*   HCT 30.8* 29.7*    220     Recent Labs     03/05/20  1505 03/06/20  0803   * 138   K 3.4* 3.0*   CL 86* 91*   CO2 32 33*   BUN 57* 52*   CREATININE 1.7* 1.6*   CALCIUM 9.4 9.1     Recent Labs     03/05/20  1505   AST 24   ALT 12   BILITOT 0.3   ALKPHOS 89     No results for input(s): INR in the last 72 hours. Recent Labs     03/05/20  1505   TROPONINI 0.05*       Urinalysis:      Lab Results   Component Value Date    NITRU Negative 03/05/2020    WBCUA 21-50 03/05/2020    BACTERIA 2+ 03/05/2020    RBCUA 0-2 03/05/2020    BLOODU Negative 03/05/2020    SPECGRAV 1.010 03/05/2020    GLUCOSEU Negative 03/05/2020       Radiology:  CT Head WO Contrast   Final Result   No acute intracranial abnormality.       Diffuse atrophic changes with findings suggesting

## 2020-03-07 NOTE — PROGRESS NOTES
Nutrition Assessment (Low Risk)    Type and Reason for Visit: Initial, Positive Nutrition Screen(wounds)    Nutrition Recommendations:   Continue carb controlled diet  Encourage protein sources  Monitor nutrition adequacy, pertinent labs, bowel habits, wt changes, and clinical progress     Nutrition Assessment:  Patient assessed for nutritional risk. Deemed to be at low risk at this time. Will continue to monitor for changes in status. Nutritionally compromised AEB non healing wounds to coccyx. Wound care following. Hx of DM2, skin cancer. Currently ordered on carb controlled diet and sleeping soundly. Per EMR, pt with limited alertness and poor historian. He was sleeping soundly at time of visit. Noted breakfast tray at bedside and consumed 100% of meal. Encourage increased protein sources to promote wound healing. Malnutrition Assessment:  · Malnutrition Status: No malnutrition    Nutrition Risk Level   Risk Level: Low    Nutrition Diagnosis:   · Problem: Increased nutrient needs(protein )  · Etiology: Increased demand for energy/nutrients    Signs and symptoms: Presence of wounds    Nutrition Intervention:  Food and/or Delivery: Continue current diet  Nutrition Education/Counseling/Coordination of Care:  Continued Inpatient Monitoring      Electronically signed by Ame Davis RD, LD on 3/7/20 at 10:42 AM EST    Contact Number: [phone]

## 2020-03-08 LAB
BASOPHILS ABSOLUTE: 0.1 K/UL (ref 0–0.2)
BASOPHILS RELATIVE PERCENT: 0.6 %
EOSINOPHILS ABSOLUTE: 0.3 K/UL (ref 0–0.6)
EOSINOPHILS RELATIVE PERCENT: 2.3 %
GLUCOSE BLD-MCNC: 138 MG/DL (ref 70–99)
GLUCOSE BLD-MCNC: 146 MG/DL (ref 70–99)
GLUCOSE BLD-MCNC: 196 MG/DL (ref 70–99)
GLUCOSE BLD-MCNC: 210 MG/DL (ref 70–99)
HCT VFR BLD CALC: 32.3 % (ref 40.5–52.5)
HEMOGLOBIN: 10.4 G/DL (ref 13.5–17.5)
LYMPHOCYTES ABSOLUTE: 2 K/UL (ref 1–5.1)
LYMPHOCYTES RELATIVE PERCENT: 17.4 %
MCH RBC QN AUTO: 29.9 PG (ref 26–34)
MCHC RBC AUTO-ENTMCNC: 32.2 G/DL (ref 31–36)
MCV RBC AUTO: 93.1 FL (ref 80–100)
MONOCYTES ABSOLUTE: 1.3 K/UL (ref 0–1.3)
MONOCYTES RELATIVE PERCENT: 11.6 %
NEUTROPHILS ABSOLUTE: 7.9 K/UL (ref 1.7–7.7)
NEUTROPHILS RELATIVE PERCENT: 68.1 %
PDW BLD-RTO: 18.2 % (ref 12.4–15.4)
PERFORMED ON: ABNORMAL
PLATELET # BLD: 239 K/UL (ref 135–450)
PMV BLD AUTO: 8.6 FL (ref 5–10.5)
RBC # BLD: 3.47 M/UL (ref 4.2–5.9)
WBC # BLD: 11.6 K/UL (ref 4–11)

## 2020-03-08 PROCEDURE — 6360000002 HC RX W HCPCS: Performed by: INTERNAL MEDICINE

## 2020-03-08 PROCEDURE — 6370000000 HC RX 637 (ALT 250 FOR IP): Performed by: INTERNAL MEDICINE

## 2020-03-08 PROCEDURE — 94640 AIRWAY INHALATION TREATMENT: CPT

## 2020-03-08 PROCEDURE — 6370000000 HC RX 637 (ALT 250 FOR IP): Performed by: NURSE PRACTITIONER

## 2020-03-08 PROCEDURE — 85025 COMPLETE CBC W/AUTO DIFF WBC: CPT

## 2020-03-08 PROCEDURE — 36415 COLL VENOUS BLD VENIPUNCTURE: CPT

## 2020-03-08 PROCEDURE — 2580000003 HC RX 258: Performed by: INTERNAL MEDICINE

## 2020-03-08 PROCEDURE — 1200000000 HC SEMI PRIVATE

## 2020-03-08 RX ORDER — POTASSIUM CHLORIDE 20 MEQ/1
40 TABLET, EXTENDED RELEASE ORAL 2 TIMES DAILY
Status: COMPLETED | OUTPATIENT
Start: 2020-03-08 | End: 2020-03-08

## 2020-03-08 RX ADMIN — Medication 10 ML: at 16:36

## 2020-03-08 RX ADMIN — APIXABAN 2.5 MG: 5 TABLET, FILM COATED ORAL at 09:12

## 2020-03-08 RX ADMIN — OXYCODONE 5 MG: 5 TABLET ORAL at 14:01

## 2020-03-08 RX ADMIN — TORSEMIDE 100 MG: 100 TABLET ORAL at 09:11

## 2020-03-08 RX ADMIN — OXYCODONE 5 MG: 5 TABLET ORAL at 09:11

## 2020-03-08 RX ADMIN — INSULIN LISPRO 4 UNITS: 100 INJECTION, SOLUTION INTRAVENOUS; SUBCUTANEOUS at 09:20

## 2020-03-08 RX ADMIN — RANOLAZINE 500 MG: 500 TABLET, FILM COATED, EXTENDED RELEASE ORAL at 20:43

## 2020-03-08 RX ADMIN — ACETAMINOPHEN 650 MG: 325 TABLET ORAL at 10:22

## 2020-03-08 RX ADMIN — POTASSIUM CHLORIDE 40 MEQ: 20 TABLET, EXTENDED RELEASE ORAL at 14:01

## 2020-03-08 RX ADMIN — INSULIN LISPRO 2 UNITS: 100 INJECTION, SOLUTION INTRAVENOUS; SUBCUTANEOUS at 14:03

## 2020-03-08 RX ADMIN — ALLOPURINOL 300 MG: 300 TABLET ORAL at 09:11

## 2020-03-08 RX ADMIN — APIXABAN 2.5 MG: 5 TABLET, FILM COATED ORAL at 20:43

## 2020-03-08 RX ADMIN — RANOLAZINE 500 MG: 500 TABLET, FILM COATED, EXTENDED RELEASE ORAL at 09:11

## 2020-03-08 RX ADMIN — TRAZODONE HYDROCHLORIDE 100 MG: 50 TABLET ORAL at 20:43

## 2020-03-08 RX ADMIN — METOCLOPRAMIDE 5 MG: 10 TABLET ORAL at 20:42

## 2020-03-08 RX ADMIN — Medication 10 ML: at 20:43

## 2020-03-08 RX ADMIN — Medication 2 PUFF: at 07:45

## 2020-03-08 RX ADMIN — INSULIN GLARGINE 18 UNITS: 100 INJECTION, SOLUTION SUBCUTANEOUS at 20:44

## 2020-03-08 RX ADMIN — CARVEDILOL 3.12 MG: 3.12 TABLET, FILM COATED ORAL at 09:11

## 2020-03-08 RX ADMIN — POTASSIUM CHLORIDE 40 MEQ: 20 TABLET, EXTENDED RELEASE ORAL at 20:42

## 2020-03-08 RX ADMIN — ATORVASTATIN CALCIUM 20 MG: 10 TABLET, FILM COATED ORAL at 09:11

## 2020-03-08 RX ADMIN — POTASSIUM CHLORIDE 10 MEQ: 750 TABLET, EXTENDED RELEASE ORAL at 09:11

## 2020-03-08 RX ADMIN — CEFTRIAXONE SODIUM 1 G: 1 INJECTION, POWDER, FOR SOLUTION INTRAMUSCULAR; INTRAVENOUS at 16:35

## 2020-03-08 RX ADMIN — OXYCODONE 5 MG: 5 TABLET ORAL at 20:42

## 2020-03-08 RX ADMIN — INSULIN LISPRO 4 UNITS: 100 INJECTION, SOLUTION INTRAVENOUS; SUBCUTANEOUS at 18:40

## 2020-03-08 RX ADMIN — Medication 2 PUFF: at 20:58

## 2020-03-08 RX ADMIN — INSULIN LISPRO 2 UNITS: 100 INJECTION, SOLUTION INTRAVENOUS; SUBCUTANEOUS at 20:44

## 2020-03-08 RX ADMIN — CARVEDILOL 3.12 MG: 3.12 TABLET, FILM COATED ORAL at 20:42

## 2020-03-08 RX ADMIN — INSULIN LISPRO 4 UNITS: 100 INJECTION, SOLUTION INTRAVENOUS; SUBCUTANEOUS at 14:02

## 2020-03-08 RX ADMIN — INSULIN LISPRO 2 UNITS: 100 INJECTION, SOLUTION INTRAVENOUS; SUBCUTANEOUS at 18:40

## 2020-03-08 ASSESSMENT — PAIN DESCRIPTION - FREQUENCY
FREQUENCY: CONTINUOUS
FREQUENCY: CONTINUOUS

## 2020-03-08 ASSESSMENT — PAIN SCALES - GENERAL
PAINLEVEL_OUTOF10: 5
PAINLEVEL_OUTOF10: 8
PAINLEVEL_OUTOF10: 5
PAINLEVEL_OUTOF10: 8
PAINLEVEL_OUTOF10: 4
PAINLEVEL_OUTOF10: 5
PAINLEVEL_OUTOF10: 6

## 2020-03-08 ASSESSMENT — PAIN DESCRIPTION - DESCRIPTORS
DESCRIPTORS: ACHING;CONSTANT
DESCRIPTORS: ACHING

## 2020-03-08 ASSESSMENT — PAIN DESCRIPTION - LOCATION
LOCATION: BACK

## 2020-03-08 ASSESSMENT — PAIN DESCRIPTION - ORIENTATION
ORIENTATION: LOWER;MID
ORIENTATION: LOWER
ORIENTATION: MID

## 2020-03-08 ASSESSMENT — PAIN DESCRIPTION - PROGRESSION
CLINICAL_PROGRESSION: NOT CHANGED
CLINICAL_PROGRESSION: NOT CHANGED

## 2020-03-08 ASSESSMENT — PAIN DESCRIPTION - ONSET
ONSET: ON-GOING
ONSET: ON-GOING

## 2020-03-08 ASSESSMENT — PAIN DESCRIPTION - PAIN TYPE
TYPE: CHRONIC PAIN
TYPE: CHRONIC PAIN

## 2020-03-08 ASSESSMENT — PAIN SCALES - WONG BAKER: WONGBAKER_NUMERICALRESPONSE: 4

## 2020-03-08 NOTE — PROGRESS NOTES
Hospitalist Progress Note      PCP: Jannie Fuller    Date of Admission: 3/5/2020    Chief Complaint: confusion    Hospital Course:   81 y.o. male who presented to [facility] with confusion. Patient was noted to be more confused at his SNF this AM. Patient was having visual hallucinations. Patient had his torsemide dose increased recently due to worsening LE edema. Facility denies any recent trauma. Patient has decreased mental capacity at baseline and is a limited historian. Subjective:   Pt c/o back pain. Re adjusted in bed. Pending AM Labs.        Medications:  Reviewed    Infusion Medications    dextrose       Scheduled Medications    torsemide  100 mg Oral Daily    collagenase   Topical Daily    allopurinol  300 mg Oral Daily    apixaban  2.5 mg Oral BID    atorvastatin  20 mg Oral Daily    budesonide-formoterol  2 puff Inhalation BID    calcitRIOL  0.25 mcg Oral Q MWF    insulin glargine  18 Units Subcutaneous Nightly    insulin lispro  4 Units Subcutaneous TID WC    polyethylene glycol  17 g Oral Every Other Day    potassium chloride  10 mEq Oral Daily    ranolazine  500 mg Oral BID    traZODone  100 mg Oral Nightly    sodium chloride flush  10 mL Intravenous 2 times per day    cefTRIAXone (ROCEPHIN) IV  1 g Intravenous Q24H    carvedilol  3.125 mg Oral BID    insulin lispro  0-12 Units Subcutaneous TID WC    insulin lispro  0-6 Units Subcutaneous Nightly    metoclopramide  5 mg Oral Nightly     PRN Meds: oxyCODONE, sodium chloride flush, acetaminophen **OR** acetaminophen, polyethylene glycol, promethazine **OR** [DISCONTINUED] ondansetron, glucose, dextrose, glucagon (rDNA), dextrose, ipratropium-albuterol, melatonin ER      Intake/Output Summary (Last 24 hours) at 3/8/2020 0951  Last data filed at 3/8/2020 0857  Gross per 24 hour   Intake 1500 ml   Output 2750 ml   Net -1250 ml       Physical Exam Performed:    BP (!) 92/57   Pulse 92   Temp 97.1 °F (36.2 °C)

## 2020-03-08 NOTE — PLAN OF CARE
Problem: Falls - Risk of:  Goal: Will remain free from falls  Description: Will remain free from falls  Outcome: Ongoing  Goal: Absence of physical injury  Description: Absence of physical injury  Outcome: Ongoing     Problem: Pain:  Goal: Pain level will decrease  Description: Pain level will decrease  Outcome: Ongoing  Goal: Control of acute pain  Description: Control of acute pain  Outcome: Ongoing  Goal: Control of chronic pain  Description: Control of chronic pain  Outcome: Ongoing

## 2020-03-09 VITALS
HEIGHT: 68 IN | DIASTOLIC BLOOD PRESSURE: 72 MMHG | BODY MASS INDEX: 33.58 KG/M2 | HEART RATE: 94 BPM | RESPIRATION RATE: 16 BRPM | OXYGEN SATURATION: 94 % | SYSTOLIC BLOOD PRESSURE: 106 MMHG | TEMPERATURE: 97.4 F | WEIGHT: 221.56 LBS

## 2020-03-09 LAB
BASOPHILS ABSOLUTE: 0.1 K/UL (ref 0–0.2)
BASOPHILS RELATIVE PERCENT: 0.7 %
BLOOD CULTURE, ROUTINE: NORMAL
CULTURE, BLOOD 2: NORMAL
EOSINOPHILS ABSOLUTE: 0.3 K/UL (ref 0–0.6)
EOSINOPHILS RELATIVE PERCENT: 2.5 %
GLUCOSE BLD-MCNC: 140 MG/DL (ref 70–99)
GLUCOSE BLD-MCNC: 161 MG/DL (ref 70–99)
GLUCOSE BLD-MCNC: 185 MG/DL (ref 70–99)
HCT VFR BLD CALC: 34 % (ref 40.5–52.5)
HEMOGLOBIN: 10.9 G/DL (ref 13.5–17.5)
LYMPHOCYTES ABSOLUTE: 2.6 K/UL (ref 1–5.1)
LYMPHOCYTES RELATIVE PERCENT: 20.5 %
MCH RBC QN AUTO: 30 PG (ref 26–34)
MCHC RBC AUTO-ENTMCNC: 32 G/DL (ref 31–36)
MCV RBC AUTO: 93.7 FL (ref 80–100)
MONOCYTES ABSOLUTE: 1.4 K/UL (ref 0–1.3)
MONOCYTES RELATIVE PERCENT: 11.4 %
NEUTROPHILS ABSOLUTE: 8 K/UL (ref 1.7–7.7)
NEUTROPHILS RELATIVE PERCENT: 64.9 %
PDW BLD-RTO: 18.6 % (ref 12.4–15.4)
PERFORMED ON: ABNORMAL
PLATELET # BLD: 292 K/UL (ref 135–450)
PMV BLD AUTO: 8.5 FL (ref 5–10.5)
RBC # BLD: 3.63 M/UL (ref 4.2–5.9)
WBC # BLD: 12.4 K/UL (ref 4–11)

## 2020-03-09 PROCEDURE — 97530 THERAPEUTIC ACTIVITIES: CPT

## 2020-03-09 PROCEDURE — 85025 COMPLETE CBC W/AUTO DIFF WBC: CPT

## 2020-03-09 PROCEDURE — 97116 GAIT TRAINING THERAPY: CPT

## 2020-03-09 PROCEDURE — 2580000003 HC RX 258: Performed by: INTERNAL MEDICINE

## 2020-03-09 PROCEDURE — 94640 AIRWAY INHALATION TREATMENT: CPT

## 2020-03-09 PROCEDURE — 6370000000 HC RX 637 (ALT 250 FOR IP): Performed by: NURSE PRACTITIONER

## 2020-03-09 PROCEDURE — 6370000000 HC RX 637 (ALT 250 FOR IP): Performed by: INTERNAL MEDICINE

## 2020-03-09 PROCEDURE — 36415 COLL VENOUS BLD VENIPUNCTURE: CPT

## 2020-03-09 PROCEDURE — 97535 SELF CARE MNGMENT TRAINING: CPT

## 2020-03-09 RX ORDER — TORSEMIDE 20 MG/1
100 TABLET ORAL DAILY
Qty: 30 TABLET | Refills: 2
Start: 2020-03-09

## 2020-03-09 RX ORDER — CEFUROXIME AXETIL 500 MG/1
500 TABLET ORAL 2 TIMES DAILY
Refills: 0 | DISCHARGE
Start: 2020-03-09 | End: 2020-03-12

## 2020-03-09 RX ORDER — OXYCODONE HCL 10 MG/1
10 TABLET, FILM COATED, EXTENDED RELEASE ORAL EVERY 12 HOURS
Qty: 6 EACH | Refills: 0 | Status: SHIPPED | OUTPATIENT
Start: 2020-03-09 | End: 2020-03-12

## 2020-03-09 RX ADMIN — ALLOPURINOL 300 MG: 300 TABLET ORAL at 11:06

## 2020-03-09 RX ADMIN — CALCITRIOL 0.25 MCG: 0.25 CAPSULE ORAL at 11:15

## 2020-03-09 RX ADMIN — Medication 2 PUFF: at 07:58

## 2020-03-09 RX ADMIN — Medication 3 MG: at 02:23

## 2020-03-09 RX ADMIN — RANOLAZINE 500 MG: 500 TABLET, FILM COATED, EXTENDED RELEASE ORAL at 11:07

## 2020-03-09 RX ADMIN — ATORVASTATIN CALCIUM 20 MG: 10 TABLET, FILM COATED ORAL at 11:07

## 2020-03-09 RX ADMIN — APIXABAN 2.5 MG: 5 TABLET, FILM COATED ORAL at 11:07

## 2020-03-09 RX ADMIN — Medication 10 ML: at 11:08

## 2020-03-09 RX ADMIN — TORSEMIDE 100 MG: 100 TABLET ORAL at 11:07

## 2020-03-09 RX ADMIN — COLLAGENASE SANTYL: 250 OINTMENT TOPICAL at 11:09

## 2020-03-09 RX ADMIN — OXYCODONE 5 MG: 5 TABLET ORAL at 16:11

## 2020-03-09 RX ADMIN — OXYCODONE 5 MG: 5 TABLET ORAL at 11:15

## 2020-03-09 RX ADMIN — CARVEDILOL 3.12 MG: 3.12 TABLET, FILM COATED ORAL at 11:07

## 2020-03-09 RX ADMIN — INSULIN LISPRO 4 UNITS: 100 INJECTION, SOLUTION INTRAVENOUS; SUBCUTANEOUS at 11:10

## 2020-03-09 RX ADMIN — POTASSIUM CHLORIDE 10 MEQ: 750 TABLET, EXTENDED RELEASE ORAL at 11:07

## 2020-03-09 RX ADMIN — ACETAMINOPHEN 650 MG: 325 TABLET ORAL at 02:23

## 2020-03-09 RX ADMIN — POLYETHYLENE GLYCOL 3350 17 G: 17 POWDER, FOR SOLUTION ORAL at 11:07

## 2020-03-09 RX ADMIN — OXYCODONE 5 MG: 5 TABLET ORAL at 02:23

## 2020-03-09 RX ADMIN — INSULIN LISPRO 2 UNITS: 100 INJECTION, SOLUTION INTRAVENOUS; SUBCUTANEOUS at 11:10

## 2020-03-09 ASSESSMENT — PAIN DESCRIPTION - DESCRIPTORS: DESCRIPTORS: ACHING;DULL;DISCOMFORT

## 2020-03-09 ASSESSMENT — PAIN DESCRIPTION - ORIENTATION
ORIENTATION: LOWER;RIGHT;LEFT
ORIENTATION: LOWER

## 2020-03-09 ASSESSMENT — PAIN DESCRIPTION - LOCATION
LOCATION: BACK;LEG
LOCATION: BACK

## 2020-03-09 ASSESSMENT — PAIN SCALES - GENERAL
PAINLEVEL_OUTOF10: 9
PAINLEVEL_OUTOF10: 10
PAINLEVEL_OUTOF10: 0
PAINLEVEL_OUTOF10: 10

## 2020-03-09 ASSESSMENT — PAIN DESCRIPTION - PAIN TYPE
TYPE: ACUTE PAIN
TYPE: CHRONIC PAIN

## 2020-03-09 ASSESSMENT — PAIN DESCRIPTION - PROGRESSION: CLINICAL_PROGRESSION: GRADUALLY WORSENING

## 2020-03-09 ASSESSMENT — PAIN DESCRIPTION - FREQUENCY: FREQUENCY: CONTINUOUS

## 2020-03-09 ASSESSMENT — PAIN DESCRIPTION - ONSET: ONSET: ON-GOING

## 2020-03-09 ASSESSMENT — PAIN - FUNCTIONAL ASSESSMENT: PAIN_FUNCTIONAL_ASSESSMENT: ACTIVITIES ARE NOT PREVENTED

## 2020-03-09 NOTE — CARE COORDINATION
Call received from figueroa at [address]. Perfect serve message sent to NP, Mark Wen, to see if patient can discharge today. In anticipation of discharge today, transport set up for 1530 via stretcher with [facility].     ** d/c pending ORDER from provider **    CM contacted spouse, Анна Castro, and notified of pending transport time. CM left  for Rafaela at [facility] regarding transport eta - pending d/c orders.     Yuly Baugh RN CMN

## 2020-03-09 NOTE — PROGRESS NOTES
Physical Therapy  Facility/Department: [facility] B3 - MED SURG  Daily Treatment Note  NAME: Benny Dave  : 1935  MRN: [id number]    Date of Service: 3/9/2020    Discharge Recommendations:  Subacute/Skilled Nursing Facility   PT Equipment Recommendations  Equipment Needed: No  Other: defer to patient's facility. Assessment   Body structures, Functions, Activity limitations: Decreased functional mobility ; Decreased cognition;Decreased posture;Decreased ADL status; Decreased endurance;Decreased strength;Decreased balance;Decreased safe awareness;Decreased high-level IADLs  Assessment: Pt progressed well this session and progressed to assist x1. Pt requires Mod A for bed mobility and Min A for sit<>stand, 10 feet of ambulation, and transfers. Pt continues to get SOB and fatigued quickly with functional mobility. Recommend SNF to increase functional mobility and increase safety. Pt is in agreement. Treatment Diagnosis: decreased independence with mobility. Specific instructions for Next Treatment: progress mobility as tolerated. PT Education: Goals; Energy Conservation; Injury Prevention;PT Role;Precautions;General Safety;Plan of Care;Transfer Training;Gait Training  Patient Education: Pt reinforced education and verbalized understanding. REQUIRES PT FOLLOW UP: Yes  Activity Tolerance  Activity Tolerance: Patient limited by fatigue;Patient limited by endurance     Patient Diagnosis(es): The primary encounter diagnosis was Altered mental status, unspecified altered mental status type. A diagnosis of Urinary tract infection without hematuria, site unspecified was also pertinent to this visit. has a past medical history of Acute MI ([address] Arthritis, Atrial fibrillation ([address] CHF (congestive heart failure) ([address] CKD (chronic kidney disease), stage III ([address] COPD (chronic obstructive pulmonary disease) ([address] Diabetes mellitus ([address] Hyperlipidemia, Hypertension, and Pacemaker.    has a past surgical history

## 2020-03-09 NOTE — PROGRESS NOTES
Occupational Therapy  Facility/Department: [facility] B3 - MED SURG  Daily Treatment Note  NAME: Rony Zamora  : 1935  MRN: [id number]    Date of Service: 3/9/2020    Discharge Recommendations:  Subacute/Skilled Nursing Facility  OT Equipment Recommendations  Equipment Needed: No  Other: defer to next level of care    Assessment   Performance deficits / Impairments: Decreased functional mobility ; Decreased balance;Decreased safe awareness;Decreased cognition;Decreased ADL status; Decreased endurance;Decreased strength;Decreased posture  After treatment, pt found to be presenting with the above mentioned deficits. Pt would benefit from continued skilled occupational therapy to address these deficits, increasing safety and independence with ADL and functional mobility. He has progressed to min A x1 with RW to walk ~8ft, but is very limited by fatigue needing seated rest after 1-2 mins of standing activity. Min A level for toilet t/fs and TA for BM hygiene currently. Will continue to assess for discharge needs. Prognosis: Fair  OT Education: OT Role;Plan of Care;ADL Adaptive Strategies;Transfer Training;Energy Conservation;Orientation;Equipment  REQUIRES OT FOLLOW UP: Yes  Activity Tolerance  Activity Tolerance: Patient Tolerated treatment well;Patient limited by fatigue;Treatment limited secondary to decreased cognition  Safety Devices  Safety Devices in place: Yes  Type of devices: Call light within reach;Gait belt;Nurse notified; Left in chair;Chair alarm in place       Patient Diagnosis(es): The primary encounter diagnosis was Altered mental status, unspecified altered mental status type. A diagnosis of Urinary tract infection without hematuria, site unspecified was also pertinent to this visit.       has a past medical history of Acute MI [address] [address] Arthritis, Atrial fibrillation ([address] CHF (congestive heart failure) ([address] CKD (chronic kidney disease), stage III ([address] COPD (chronic obstructive pulmonary disease) [address] [address] Diabetes mellitus ([facility] [address] Hyperlipidemia, Hypertension, and Pacemaker. has a past surgical history that includes Coronary artery bypass graft; Cardiac surgery (4/10/2013); Abdomen surgery (10/15/13); skin biopsy; and bronchoscopy (N/A, 10/23/2019). Restrictions  Restrictions/Precautions  Restrictions/Precautions: General Precautions, Fall Risk, Up as Tolerated  Position Activity Restriction  Other position/activity restrictions: Up as tolerated. Subjective   General  Chart Reviewed: Yes  Patient assessed for rehabilitation services?: Yes  Additional Pertinent Hx: DM II, CHF, HTN, COPD   Family / Caregiver Present: No  Referring Practitioner: Jeanine Figueroa MD  Diagnosis: Sepsis 2/2 UTI, Acute metabolic encephalopathy  Subjective  Subjective: Pt in bed on arrival, agreeable. RN cleared pt for tx.   General Comment  Comments: Per RN okay to treat and for OOB activity       Orientation  Orientation  Overall Orientation Status: (Not formally tested, but alert, appropriate, and cooperative throughout session)     Objective    ADL  Feeding: Setup(to drink seated)  Grooming: Contact guard assistance;Setup(to wash and dry hands standing, limited by fatigue)  Toileting: Dependent/Total(BM hygiene standing plus CGA for balance with RW; dutton)           Balance  Sitting Balance: Stand by assistance  Standing Balance: Minimal assistance(with RW)  Functional Mobility  Functional - Mobility Device: Rolling Walker  Activity: To/from bathroom  Assist Level: Minimal assistance  Toilet Transfers  Toilet - Technique: Ambulating(with RW)  Equipment Used: Standard toilet  Toilet Transfer: Minimal assistance     Bed mobility  Supine to Sit: Moderate assistance(HOB raised, use of bed rail)  Scooting: Contact guard assistance(to EOB with rail)     Transfers  Sit to stand: Minimal assistance  Stand to sit: Minimal assistance  Transfer Comments: VCs for safe t/f technique and safe use of RW Cognition  Overall Cognitive Status: Exceptions  Arousal/Alertness: Delayed responses to stimuli  Following Commands: Follows one step commands with increased time  Attention Span: Attends with cues to redirect; Difficulty dividing attention; Difficulty attending to directions  Memory: Decreased recall of recent events;Decreased short term memory;Decreased recall of biographical Information  Safety Judgement: Decreased awareness of need for safety;Decreased awareness of need for assistance  Problem Solving: Assistance required to generate solutions;Assistance required to implement solutions;Decreased awareness of errors;Assistance required to identify errors made;Assistance required to correct errors made  Insights: Decreased awareness of deficits  Initiation: Requires cues for some  Sequencing: Requires cues for some                                         BUE strength and AROM are [facility] based on functional presentation. Education: Role of OT, safe t/f training, safe use of DME, awareness of deficits, discharge planning, ADL as therapeutic exercise, importance of [place]  Times per week: 3-5x/week   Current Treatment Recommendations: Strengthening, Balance Training, Functional Mobility Training, Endurance Training, Cognitive Reorientation, Patient/Caregiver Education & Training, Equipment Evaluation, Education, & procurement, Self-Care / ADL, Positioning, Safety Education & Training, Pain Management, Gait Training    AM-PAC Score        AM-PAC Inpatient Daily Activity Raw Score: 11 (03/09/20 1100)  AM-PAC Inpatient ADL T-Scale Score : 29.04 (03/09/20 1100)  ADL Inpatient CMS 0-100% Score: 70.42 (03/09/20 1100)  ADL Inpatient CMS G-Code Modifier : CL (03/09/20 1100)    Goals  Short term goals  Time Frame for Short term goals: 1 week (by 3/13/20)  Short term goal 1: Pt will complete functional transfers with Min A. GOAL MET 3/9.  NEW GOAL: Functional t/fs with SBA  Short term goal 2: Pt will complete LE dressing with Mod A   Short term goal 3: Pt will perform 2-3 grooming tasks seated with setup/verbal cues by 3/10  Patient Goals   Patient goals : Pt did not state goal at time of eval       Therapy Time   Individual Concurrent Group Co-treatment   Time In 0916         Time Out 0944         Minutes 28         Timed Code Treatment Minutes: 24 Minutes       If patient is discharged prior to next treatment session, this note will serve as the discharge summary.   Real Stout, OTR/L #[id number]

## 2020-03-09 NOTE — DISCHARGE INSTR - COC
defibrillator (CRT-D) in place Z95.810    IDDM (insulin dependent diabetes mellitus) ([facility] E11.9, Z79.4    Acute renal failure with tubular necrosis ([facility]) N17.0    CAD s/p CABG & stents I25.10    Chronic combined systolic (EF 23%) & diastolic (grade 1 LVDD) CHF I50.42    Iron deficiency anemia D50.9    CKD (chronic kidney disease) stage 3, GFR 30-59 ml/min ([facility]) N18.3    COPD (chronic obstructive pulmonary disease) ([facility]) J44.9    Chronic hypoxemic respiratory failure on 3 L home O2 J96.11    Hx mucus plugging of bronchi J98.09    Right ventricular systolic dysfunction V18.1    Acute encephalopathy G93.40    Hx UTI due to E. coli & Pseudomonas (Jan 2018) Z87.440    GRACE-on-CKD3 N17.9, N18.9    Septic shock ([facility]) A41.9, R65.21    Acute on chronic respiratory failure with hypoxemia ([facility] [facility] J96.21    Former smoker Z87.891    Pacemaker malfunction, initial encounter T82.111A    Hyperlipidemia E78.5    Acute on chronic systolic congestive heart failure ([facility]) I50.23    Fatigue R53.83    PAF (paroxysmal atrial fibrillation) ([facility]) I48.0    GRACE (acute kidney injury) ([facility]) N17.9    Encephalopathy G93.40    [facility]ACE (acute kidney injury) ([facility] N17.9    Pneumonia J18.9    Gram-negative bacteremia R78.81    Leukocytosis D72.829    Cardiomyopathy ([facility]) D95.2    Diastolic dysfunction L89.29    DM (diabetes mellitus), secondary uncontrolled ([facility]) E13.65    Back pain M54.9    UTI (urinary tract infection) N39.0       Isolation/Infection:   Isolation          No Isolation        Patient Infection Status     None to display          Nurse Assessment:  Last Vital Signs: /72   Pulse 94   Temp 97.4 °F (36.3 °C) (Oral)   Resp 16   Ht 5' 8\" (1.727 m)   Wt 221 lb 9 oz (100.5 kg)   SpO2 94%   BMI 33.69 kg/m²     Last documented pain score (0-10 scale): Pain Level: 10  Last Weight:   Wt Readings from Last 1 Encounters:   03/07/20 221 lb 9 oz (100.5 kg)     Mental Status:  oriented, alert, able to SIGNATURE:  Electronically signed by LISSETTE Daniel CNP on 3/9/20 at 2:19 PM EDT

## 2020-03-09 NOTE — CARE COORDINATION
Orders faxed to [facility]. Admissions staff, Yelena Morales notified by this CM via telephone.      Vasu Otto RN CM

## 2020-03-10 NOTE — DISCHARGE SUMMARY
Hospital Medicine Discharge Summary    Patient ID: Moraima Campbell      Patient's PCP: Juliane Marlyskalin Date: 3/5/2020     Discharge Date: 3/9/2020      Admitting Physician: Eulalia Piper MD     Discharge Physician: LISSETTE Sandy - CNP     Discharge Diagnoses: Active Hospital Problems    Diagnosis    UTI (urinary tract infection) [N39.0]       The patient was seen and examined on day of discharge and this discharge summary is in conjunction with any daily progress note from day of discharge. Hospital Course:     81 y. o. male who presented to [facility] with confusion. Patient was noted to be more confused at his SNF this AM. Patient was having visual hallucinations. Patient had his torsemide dose increased recently due to worsening LE edema. Facility denies any recent trauma. Patient has decreased mental capacity at baseline and is a limited historian.     Sepsis 2/2 UTI  - presented with tachycardia, leukocytosis. Lactate WNL  - UTI on UA. Urine culture unfortunately never obtainted. - started on ceftriaxone - transitioned to PO Ceftin at dc to complete 7 day course. - blood cultures NGTD     Acute metabolic encephalopathy  - 2/2 UTI  - CT head negative  - stopped gabapentin   - supportive measures     CAD  - no active chest pain  - EKG negative. Trop mildly elevated chronically  - continue home ASA, statin, BB, ranexa     Chronic systolic heart failure  - appears euvolemic  - last echo 1/19 with EF 25%, global hypokinesis  - holding home torsemide, will plan to restart tomorrow  - continue BB.  Not on ACE due to CKD     COPD with chronic hypoxic respiratory failure  - no evidence of exacerbation  - continue home inhalers, PRN nebs  - on home O2     Paroxysmal Afib  - paced rhythm  - continue home metoprolol, eliquis     CKD stage III  - Cr at baseline  - continue to monitor     DMII  - well controlled  - continue home insulin with SSI     HTN  - well controlled  - continue home coreg     HLD  - continue home statin         Physical Exam Performed:     /72   Pulse 94   Temp 97.4 °F (36.3 °C) (Oral)   Resp 16   Ht 5' 8\" (1.727 m)   Wt 221 lb 9 oz (100.5 kg)   SpO2 94%   BMI 33.69 kg/m²       General appearance:  No apparent distress, appears stated age and cooperative. HEENT:  Normal cephalic, atraumatic without obvious deformity. Pupils equal, round, and reactive to light. Extra ocular muscles intact. Conjunctivae/corneas clear. Neck: Supple, with full range of motion. No jugular venous distention. Trachea midline. Respiratory:  Normal respiratory effort. Clear to auscultation, bilaterally without Rales/Wheezes/Rhonchi. Cardiovascular:  Regular rate and rhythm with normal S1/S2 without murmurs, rubs or gallops. Abdomen: Soft, non-tender, non-distended with normal bowel sounds. Musculoskeletal:  No clubbing, cyanosis or edema bilaterally. Full range of motion without deformity. Skin: Skin color, texture, turgor normal.  No rashes or lesions. Neurologic:  Neurovascularly intact without any focal sensory/motor deficits. Cranial nerves: II-XII intact, grossly non-focal.  Psychiatric:  Alert and oriented to person only. Capillary Refill: Brisk,< 3 seconds   Peripheral Pulses: +2 palpable, equal bilaterally       Labs: For convenience and continuity at follow-up the following most recent labs are provided:      CBC:    Lab Results   Component Value Date    WBC 12.4 03/09/2020    HGB 10.9 03/09/2020    HCT 34.0 03/09/2020     03/09/2020       Renal:    Lab Results   Component Value Date     03/07/2020    K 3.4 03/07/2020    K 3.0 03/06/2020    CL 90 03/07/2020    CO2 33 03/07/2020    BUN 47 03/07/2020    CREATININE 1.6 03/07/2020    CALCIUM 9.3 03/07/2020    PHOS 3.8 06/10/2019         Significant Diagnostic Studies    Radiology:   CT Head WO Contrast   Final Result   No acute intracranial abnormality.       Diffuse atrophic changes with findings suggesting chronic microvascular   ischemia                Consults:     IP CONSULT TO HOSPITALIST    Disposition: To long-term care at [facility] at Discharge: Stable    Discharge Instructions/Follow-up:  F/u with physician at [facility]. Code Status:  Limited    Activity: activity as tolerated    Diet: diabetic diet      Discharge Medications:     Discharge Medication List as of 3/9/2020  4:07 PM           Details   collagenase 250 UNIT/GM ointment Apply topically daily. , Disp-1 Tube, R-0, NO PRINT      cefUROXime (CEFTIN) 500 MG tablet Take 1 tablet by mouth 2 times daily for 3 days, R-0DC to SNF              Details   oxyCODONE (OXYCONTIN) 10 MG extended release tablet Take 1 tablet by mouth every 12 hours for 3 days. , Disp-6 each, R-0Print      torsemide (DEMADEX) 20 MG tablet Take 5 tablets by mouth daily, Disp-30 tablet, R-2NO PRINT              Details   Menthol, Topical Analgesic, (BIOFREEZE) 4 % GEL Apply topically every 8 hours as neededHistorical Med      fluticasone-vilanterol (BREO ELLIPTA) 100-25 MCG/INH AEPB inhaler Inhale 1 puff into the lungs dailyHistorical Med      ferrous sulfate 325 (65 Fe) MG tablet Take 325 mg by mouth 2 times dailyHistorical Med      lidocaine (LIDODERM) 5 % Place 1 patch onto the skin daily 12 hours on, 12 hours off. Historical Med      melatonin 3 MG TABS tablet Take 3 mg by mouth nightly as neededHistorical Med      metOLazone (ZAROXOLYN) 5 MG tablet Take 5 mg by mouth twice a week Monday and thursdayHistorical Med      omeprazole (PRILOSEC) 40 MG delayed release capsule Take 40 mg by mouth dailyHistorical Med      sennosides-docusate sodium (SENOKOT-S) 8.6-50 MG tablet Take 1 tablet by mouth nightlyHistorical Med      insulin glargine (LANTUS) 100 UNIT/ML injection vial Inject 18 Units into the skin nightly, Disp-1 vial, R-3DC to SNF      !! insulin lispro (HUMALOG) 100 UNIT/ML injection vial Inject 0-3 Units into the skin nightly, Disp-1 vial, R-3DC

## 2020-03-25 PROBLEM — N17.9 AKI (ACUTE KIDNEY INJURY) (HCC): Status: RESOLVED | Noted: 2019-04-27 | Resolved: 2020-03-24

## 2020-04-04 PROBLEM — N39.0 UTI (URINARY TRACT INFECTION): Status: RESOLVED | Noted: 2020-03-05 | Resolved: 2020-04-04

## 2020-11-05 ENCOUNTER — APPOINTMENT (OUTPATIENT)
Dept: CT IMAGING | Age: 85
DRG: 180 | End: 2020-11-05
Payer: MEDICARE

## 2020-11-05 ENCOUNTER — APPOINTMENT (OUTPATIENT)
Dept: GENERAL RADIOLOGY | Age: 85
DRG: 180 | End: 2020-11-05
Payer: MEDICARE

## 2020-11-05 ENCOUNTER — HOSPITAL ENCOUNTER (INPATIENT)
Age: 85
LOS: 4 days | Discharge: NURSING, REHAB OR LONG-TERM CARE | DRG: 180 | End: 2020-11-09
Attending: EMERGENCY MEDICINE | Admitting: INTERNAL MEDICINE
Payer: MEDICARE

## 2020-11-05 PROBLEM — R91.8 MULTIPLE LUNG NODULES ON CT: Status: ACTIVE | Noted: 2020-11-05

## 2020-11-05 PROBLEM — R91.8 MULTIPLE LUNG NODULES: Status: ACTIVE | Noted: 2020-11-05

## 2020-11-05 LAB
A/G RATIO: 1.1 (ref 1.1–2.2)
ALBUMIN SERPL-MCNC: 3.7 G/DL (ref 3.4–5)
ALP BLD-CCNC: 81 U/L (ref 40–129)
ALT SERPL-CCNC: 15 U/L (ref 10–40)
ANION GAP SERPL CALCULATED.3IONS-SCNC: 10 MMOL/L (ref 3–16)
AST SERPL-CCNC: 27 U/L (ref 15–37)
BACTERIA: ABNORMAL /HPF
BASE EXCESS ARTERIAL: 6.2 MMOL/L (ref -3–3)
BASOPHILS ABSOLUTE: 0 K/UL (ref 0–0.2)
BASOPHILS RELATIVE PERCENT: 0.2 %
BILIRUB SERPL-MCNC: <0.2 MG/DL (ref 0–1)
BILIRUBIN URINE: NEGATIVE
BLOOD, URINE: ABNORMAL
BUN BLDV-MCNC: 45 MG/DL (ref 7–20)
CALCIUM SERPL-MCNC: 9.2 MG/DL (ref 8.3–10.6)
CARBOXYHEMOGLOBIN ARTERIAL: 1.1 % (ref 0–1.5)
CHLORIDE BLD-SCNC: 100 MMOL/L (ref 99–110)
CLARITY: CLEAR
CO2: 29 MMOL/L (ref 21–32)
COLOR: YELLOW
CREAT SERPL-MCNC: 1.8 MG/DL (ref 0.8–1.3)
EKG ATRIAL RATE: 87 BPM
EKG DIAGNOSIS: NORMAL
EKG P AXIS: 72 DEGREES
EKG P-R INTERVAL: 186 MS
EKG Q-T INTERVAL: 440 MS
EKG QRS DURATION: 138 MS
EKG QTC CALCULATION (BAZETT): 529 MS
EKG R AXIS: 233 DEGREES
EKG T AXIS: 36 DEGREES
EKG VENTRICULAR RATE: 87 BPM
EOSINOPHILS ABSOLUTE: 0 K/UL (ref 0–0.6)
EOSINOPHILS RELATIVE PERCENT: 0.1 %
EPITHELIAL CELLS, UA: ABNORMAL /HPF (ref 0–5)
GFR AFRICAN AMERICAN: 44
GFR NON-AFRICAN AMERICAN: 36
GLOBULIN: 3.3 G/DL
GLUCOSE BLD-MCNC: 155 MG/DL (ref 70–99)
GLUCOSE BLD-MCNC: 177 MG/DL (ref 70–99)
GLUCOSE BLD-MCNC: 198 MG/DL (ref 70–99)
GLUCOSE BLD-MCNC: 98 MG/DL (ref 70–99)
GLUCOSE URINE: NEGATIVE MG/DL
HCO3 ARTERIAL: 32.7 MMOL/L (ref 21–29)
HCT VFR BLD CALC: 32.2 % (ref 40.5–52.5)
HEMOGLOBIN, ART, EXTENDED: 10.7 G/DL (ref 13.5–17.5)
HEMOGLOBIN: 10.5 G/DL (ref 13.5–17.5)
INR BLD: 1.11 (ref 0.86–1.14)
KETONES, URINE: NEGATIVE MG/DL
LACTIC ACID, SEPSIS: 1.6 MMOL/L (ref 0.4–1.9)
LEUKOCYTE ESTERASE, URINE: ABNORMAL
LYMPHOCYTES ABSOLUTE: 1.3 K/UL (ref 1–5.1)
LYMPHOCYTES RELATIVE PERCENT: 20.1 %
MCH RBC QN AUTO: 30 PG (ref 26–34)
MCHC RBC AUTO-ENTMCNC: 32.5 G/DL (ref 31–36)
MCV RBC AUTO: 92.3 FL (ref 80–100)
METHEMOGLOBIN ARTERIAL: 0.5 %
MICROSCOPIC EXAMINATION: YES
MONOCYTES ABSOLUTE: 0.5 K/UL (ref 0–1.3)
MONOCYTES RELATIVE PERCENT: 7.3 %
NEUTROPHILS ABSOLUTE: 4.7 K/UL (ref 1.7–7.7)
NEUTROPHILS RELATIVE PERCENT: 72.3 %
NITRITE, URINE: NEGATIVE
O2 CONTENT ARTERIAL: 7 ML/DL
O2 SAT, ARTERIAL: 43.6 %
O2 THERAPY: ABNORMAL
PCO2 ARTERIAL: 57 MMHG (ref 35–45)
PDW BLD-RTO: 17.8 % (ref 12.4–15.4)
PERFORMED ON: ABNORMAL
PERFORMED ON: ABNORMAL
PERFORMED ON: NORMAL
PH ARTERIAL: 7.38 (ref 7.35–7.45)
PH UA: 6 (ref 5–8)
PLATELET # BLD: 199 K/UL (ref 135–450)
PMV BLD AUTO: 8.8 FL (ref 5–10.5)
PO2 ARTERIAL: 24.9 MMHG (ref 75–108)
POTASSIUM REFLEX MAGNESIUM: 4.6 MMOL/L (ref 3.5–5.1)
PRO-BNP: 1258 PG/ML (ref 0–449)
PROTEIN UA: NEGATIVE MG/DL
PROTHROMBIN TIME: 12.9 SEC (ref 10–13.2)
RBC # BLD: 3.49 M/UL (ref 4.2–5.9)
RBC UA: ABNORMAL /HPF (ref 0–4)
SARS-COV-2, NAAT: NOT DETECTED
SODIUM BLD-SCNC: 139 MMOL/L (ref 136–145)
SPECIFIC GRAVITY UA: 1.01 (ref 1–1.03)
TCO2 ARTERIAL: 34.4 MMOL/L
TOTAL PROTEIN: 7 G/DL (ref 6.4–8.2)
TROPONIN: 0.03 NG/ML
TROPONIN: 0.04 NG/ML
URINE CULTURE, ROUTINE: NORMAL
URINE REFLEX TO CULTURE: YES
URINE TYPE: ABNORMAL
UROBILINOGEN, URINE: 0.2 E.U./DL
WBC # BLD: 6.5 K/UL (ref 4–11)
WBC UA: ABNORMAL /HPF (ref 0–5)

## 2020-11-05 PROCEDURE — 71250 CT THORAX DX C-: CPT

## 2020-11-05 PROCEDURE — 87086 URINE CULTURE/COLONY COUNT: CPT

## 2020-11-05 PROCEDURE — 36600 WITHDRAWAL OF ARTERIAL BLOOD: CPT

## 2020-11-05 PROCEDURE — 83880 ASSAY OF NATRIURETIC PEPTIDE: CPT

## 2020-11-05 PROCEDURE — 93010 ELECTROCARDIOGRAM REPORT: CPT | Performed by: INTERNAL MEDICINE

## 2020-11-05 PROCEDURE — 6370000000 HC RX 637 (ALT 250 FOR IP): Performed by: INTERNAL MEDICINE

## 2020-11-05 PROCEDURE — 94761 N-INVAS EAR/PLS OXIMETRY MLT: CPT

## 2020-11-05 PROCEDURE — 1200000000 HC SEMI PRIVATE

## 2020-11-05 PROCEDURE — 93005 ELECTROCARDIOGRAM TRACING: CPT | Performed by: EMERGENCY MEDICINE

## 2020-11-05 PROCEDURE — 85610 PROTHROMBIN TIME: CPT

## 2020-11-05 PROCEDURE — 81001 URINALYSIS AUTO W/SCOPE: CPT

## 2020-11-05 PROCEDURE — 71045 X-RAY EXAM CHEST 1 VIEW: CPT

## 2020-11-05 PROCEDURE — 2580000003 HC RX 258: Performed by: INTERNAL MEDICINE

## 2020-11-05 PROCEDURE — 82803 BLOOD GASES ANY COMBINATION: CPT

## 2020-11-05 PROCEDURE — 85025 COMPLETE CBC W/AUTO DIFF WBC: CPT

## 2020-11-05 PROCEDURE — 6360000002 HC RX W HCPCS: Performed by: INTERNAL MEDICINE

## 2020-11-05 PROCEDURE — 87205 SMEAR GRAM STAIN: CPT

## 2020-11-05 PROCEDURE — 36415 COLL VENOUS BLD VENIPUNCTURE: CPT

## 2020-11-05 PROCEDURE — 2700000000 HC OXYGEN THERAPY PER DAY

## 2020-11-05 PROCEDURE — 94640 AIRWAY INHALATION TREATMENT: CPT

## 2020-11-05 PROCEDURE — U0002 COVID-19 LAB TEST NON-CDC: HCPCS

## 2020-11-05 PROCEDURE — 99223 1ST HOSP IP/OBS HIGH 75: CPT | Performed by: INTERNAL MEDICINE

## 2020-11-05 PROCEDURE — 70450 CT HEAD/BRAIN W/O DYE: CPT

## 2020-11-05 PROCEDURE — P9612 CATHETERIZE FOR URINE SPEC: HCPCS

## 2020-11-05 PROCEDURE — 84484 ASSAY OF TROPONIN QUANT: CPT

## 2020-11-05 PROCEDURE — 80053 COMPREHEN METABOLIC PANEL: CPT

## 2020-11-05 PROCEDURE — 83605 ASSAY OF LACTIC ACID: CPT

## 2020-11-05 PROCEDURE — 99285 EMERGENCY DEPT VISIT HI MDM: CPT

## 2020-11-05 PROCEDURE — 87070 CULTURE OTHR SPECIMN AEROBIC: CPT

## 2020-11-05 RX ORDER — PANTOPRAZOLE SODIUM 40 MG/1
40 TABLET, DELAYED RELEASE ORAL
Status: DISCONTINUED | OUTPATIENT
Start: 2020-11-05 | End: 2020-11-09 | Stop reason: HOSPADM

## 2020-11-05 RX ORDER — FERROUS SULFATE 325(65) MG
325 TABLET ORAL 2 TIMES DAILY
Status: DISCONTINUED | OUTPATIENT
Start: 2020-11-05 | End: 2020-11-09 | Stop reason: HOSPADM

## 2020-11-05 RX ORDER — IPRATROPIUM BROMIDE AND ALBUTEROL SULFATE 2.5; .5 MG/3ML; MG/3ML
1 SOLUTION RESPIRATORY (INHALATION) 3 TIMES DAILY
Status: DISCONTINUED | OUTPATIENT
Start: 2020-11-05 | End: 2020-11-05

## 2020-11-05 RX ORDER — ACETAMINOPHEN 650 MG/1
650 SUPPOSITORY RECTAL EVERY 6 HOURS PRN
Status: DISCONTINUED | OUTPATIENT
Start: 2020-11-05 | End: 2020-11-09 | Stop reason: HOSPADM

## 2020-11-05 RX ORDER — IPRATROPIUM BROMIDE AND ALBUTEROL SULFATE 2.5; .5 MG/3ML; MG/3ML
1 SOLUTION RESPIRATORY (INHALATION) EVERY 4 HOURS PRN
Status: DISCONTINUED | OUTPATIENT
Start: 2020-11-05 | End: 2020-11-09 | Stop reason: HOSPADM

## 2020-11-05 RX ORDER — TAMSULOSIN HYDROCHLORIDE 0.4 MG/1
0.4 CAPSULE ORAL DAILY
Status: DISCONTINUED | OUTPATIENT
Start: 2020-11-05 | End: 2020-11-09 | Stop reason: HOSPADM

## 2020-11-05 RX ORDER — POLYETHYLENE GLYCOL 3350 17 G/17G
17 POWDER, FOR SOLUTION ORAL DAILY PRN
Status: DISCONTINUED | OUTPATIENT
Start: 2020-11-05 | End: 2020-11-09 | Stop reason: HOSPADM

## 2020-11-05 RX ORDER — SODIUM CHLORIDE 0.9 % (FLUSH) 0.9 %
10 SYRINGE (ML) INJECTION PRN
Status: DISCONTINUED | OUTPATIENT
Start: 2020-11-05 | End: 2020-11-09 | Stop reason: HOSPADM

## 2020-11-05 RX ORDER — ALBUTEROL SULFATE 90 UG/1
2 AEROSOL, METERED RESPIRATORY (INHALATION) EVERY 6 HOURS PRN
Status: DISCONTINUED | OUTPATIENT
Start: 2020-11-05 | End: 2020-11-09 | Stop reason: HOSPADM

## 2020-11-05 RX ORDER — SENNA AND DOCUSATE SODIUM 50; 8.6 MG/1; MG/1
1 TABLET, FILM COATED ORAL NIGHTLY
Status: DISCONTINUED | OUTPATIENT
Start: 2020-11-05 | End: 2020-11-09 | Stop reason: HOSPADM

## 2020-11-05 RX ORDER — CARVEDILOL 3.12 MG/1
3.12 TABLET ORAL 2 TIMES DAILY
Status: DISCONTINUED | OUTPATIENT
Start: 2020-11-05 | End: 2020-11-09 | Stop reason: HOSPADM

## 2020-11-05 RX ORDER — ACETAMINOPHEN 325 MG/1
650 TABLET ORAL EVERY 6 HOURS PRN
Status: DISCONTINUED | OUTPATIENT
Start: 2020-11-05 | End: 2020-11-09 | Stop reason: HOSPADM

## 2020-11-05 RX ORDER — ATORVASTATIN CALCIUM 10 MG/1
20 TABLET, FILM COATED ORAL DAILY
Status: DISCONTINUED | OUTPATIENT
Start: 2020-11-05 | End: 2020-11-09 | Stop reason: HOSPADM

## 2020-11-05 RX ORDER — BUDESONIDE AND FORMOTEROL FUMARATE DIHYDRATE 80; 4.5 UG/1; UG/1
2 AEROSOL RESPIRATORY (INHALATION) 2 TIMES DAILY
Status: DISCONTINUED | OUTPATIENT
Start: 2020-11-05 | End: 2020-11-05 | Stop reason: SDUPTHER

## 2020-11-05 RX ORDER — BUDESONIDE AND FORMOTEROL FUMARATE DIHYDRATE 160; 4.5 UG/1; UG/1
2 AEROSOL RESPIRATORY (INHALATION) 2 TIMES DAILY
Status: DISCONTINUED | OUTPATIENT
Start: 2020-11-05 | End: 2020-11-09 | Stop reason: HOSPADM

## 2020-11-05 RX ORDER — METOCLOPRAMIDE 10 MG/1
5 TABLET ORAL 4 TIMES DAILY
Status: DISCONTINUED | OUTPATIENT
Start: 2020-11-05 | End: 2020-11-09 | Stop reason: HOSPADM

## 2020-11-05 RX ORDER — SODIUM CHLORIDE 0.9 % (FLUSH) 0.9 %
10 SYRINGE (ML) INJECTION EVERY 12 HOURS SCHEDULED
Status: DISCONTINUED | OUTPATIENT
Start: 2020-11-05 | End: 2020-11-09 | Stop reason: HOSPADM

## 2020-11-05 RX ORDER — TRAZODONE HYDROCHLORIDE 50 MG/1
150 TABLET ORAL NIGHTLY
Status: DISCONTINUED | OUTPATIENT
Start: 2020-11-05 | End: 2020-11-05

## 2020-11-05 RX ORDER — POTASSIUM CHLORIDE 750 MG/1
10 TABLET, EXTENDED RELEASE ORAL DAILY
Status: DISCONTINUED | OUTPATIENT
Start: 2020-11-05 | End: 2020-11-09 | Stop reason: HOSPADM

## 2020-11-05 RX ORDER — PROMETHAZINE HYDROCHLORIDE 25 MG/1
12.5 TABLET ORAL EVERY 6 HOURS PRN
Status: DISCONTINUED | OUTPATIENT
Start: 2020-11-05 | End: 2020-11-09 | Stop reason: HOSPADM

## 2020-11-05 RX ORDER — ALLOPURINOL 300 MG/1
300 TABLET ORAL DAILY
Status: DISCONTINUED | OUTPATIENT
Start: 2020-11-05 | End: 2020-11-09 | Stop reason: HOSPADM

## 2020-11-05 RX ORDER — DEXTROSE MONOHYDRATE 50 MG/ML
100 INJECTION, SOLUTION INTRAVENOUS PRN
Status: DISCONTINUED | OUTPATIENT
Start: 2020-11-05 | End: 2020-11-09 | Stop reason: HOSPADM

## 2020-11-05 RX ORDER — ONDANSETRON 2 MG/ML
4 INJECTION INTRAMUSCULAR; INTRAVENOUS EVERY 6 HOURS PRN
Status: DISCONTINUED | OUTPATIENT
Start: 2020-11-05 | End: 2020-11-09 | Stop reason: HOSPADM

## 2020-11-05 RX ORDER — NICOTINE POLACRILEX 4 MG
15 LOZENGE BUCCAL PRN
Status: DISCONTINUED | OUTPATIENT
Start: 2020-11-05 | End: 2020-11-09 | Stop reason: HOSPADM

## 2020-11-05 RX ORDER — INSULIN GLARGINE 100 [IU]/ML
18 INJECTION, SOLUTION SUBCUTANEOUS NIGHTLY
Status: DISCONTINUED | OUTPATIENT
Start: 2020-11-05 | End: 2020-11-06

## 2020-11-05 RX ORDER — RANOLAZINE 500 MG/1
500 TABLET, EXTENDED RELEASE ORAL 2 TIMES DAILY
Status: DISCONTINUED | OUTPATIENT
Start: 2020-11-05 | End: 2020-11-09 | Stop reason: HOSPADM

## 2020-11-05 RX ORDER — DEXTROSE MONOHYDRATE 25 G/50ML
12.5 INJECTION, SOLUTION INTRAVENOUS PRN
Status: DISCONTINUED | OUTPATIENT
Start: 2020-11-05 | End: 2020-11-09 | Stop reason: HOSPADM

## 2020-11-05 RX ORDER — TORSEMIDE 100 MG/1
100 TABLET ORAL DAILY
Status: DISCONTINUED | OUTPATIENT
Start: 2020-11-05 | End: 2020-11-09 | Stop reason: HOSPADM

## 2020-11-05 RX ADMIN — CARVEDILOL 3.12 MG: 3.12 TABLET, FILM COATED ORAL at 20:20

## 2020-11-05 RX ADMIN — PANTOPRAZOLE SODIUM 40 MG: 40 TABLET, DELAYED RELEASE ORAL at 11:05

## 2020-11-05 RX ADMIN — CARVEDILOL 3.12 MG: 3.12 TABLET, FILM COATED ORAL at 11:05

## 2020-11-05 RX ADMIN — Medication 2 PUFF: at 20:06

## 2020-11-05 RX ADMIN — POTASSIUM CHLORIDE 10 MEQ: 750 TABLET, EXTENDED RELEASE ORAL at 11:04

## 2020-11-05 RX ADMIN — ALLOPURINOL 300 MG: 300 TABLET ORAL at 11:07

## 2020-11-05 RX ADMIN — CEFTRIAXONE SODIUM 1 G: 1 INJECTION, POWDER, FOR SOLUTION INTRAMUSCULAR; INTRAVENOUS at 10:47

## 2020-11-05 RX ADMIN — RANOLAZINE 500 MG: 500 TABLET, FILM COATED, EXTENDED RELEASE ORAL at 11:04

## 2020-11-05 RX ADMIN — METOCLOPRAMIDE 5 MG: 10 TABLET ORAL at 17:25

## 2020-11-05 RX ADMIN — METOCLOPRAMIDE 5 MG: 10 TABLET ORAL at 11:05

## 2020-11-05 RX ADMIN — DOCUSATE SODIUM 50 MG AND SENNOSIDES 8.6 MG 1 TABLET: 8.6; 5 TABLET, FILM COATED ORAL at 20:20

## 2020-11-05 RX ADMIN — INSULIN GLARGINE 18 UNITS: 100 INJECTION, SOLUTION SUBCUTANEOUS at 20:58

## 2020-11-05 RX ADMIN — ATORVASTATIN CALCIUM 20 MG: 10 TABLET, FILM COATED ORAL at 11:05

## 2020-11-05 RX ADMIN — METOCLOPRAMIDE 5 MG: 10 TABLET ORAL at 20:19

## 2020-11-05 RX ADMIN — TAMSULOSIN HYDROCHLORIDE 0.4 MG: 0.4 CAPSULE ORAL at 11:05

## 2020-11-05 RX ADMIN — SODIUM CHLORIDE, PRESERVATIVE FREE 10 ML: 5 INJECTION INTRAVENOUS at 11:08

## 2020-11-05 RX ADMIN — COLLAGENASE SANTYL: 250 OINTMENT TOPICAL at 17:25

## 2020-11-05 RX ADMIN — TORSEMIDE 100 MG: 100 TABLET ORAL at 11:04

## 2020-11-05 RX ADMIN — FERROUS SULFATE TAB 325 MG (65 MG ELEMENTAL FE) 325 MG: 325 (65 FE) TAB at 20:19

## 2020-11-05 RX ADMIN — INSULIN LISPRO 1 UNITS: 100 INJECTION, SOLUTION INTRAVENOUS; SUBCUTANEOUS at 20:58

## 2020-11-05 RX ADMIN — SODIUM CHLORIDE, PRESERVATIVE FREE 10 ML: 5 INJECTION INTRAVENOUS at 20:22

## 2020-11-05 RX ADMIN — FERROUS SULFATE TAB 325 MG (65 MG ELEMENTAL FE) 325 MG: 325 (65 FE) TAB at 11:07

## 2020-11-05 RX ADMIN — INSULIN LISPRO 1 UNITS: 100 INJECTION, SOLUTION INTRAVENOUS; SUBCUTANEOUS at 17:26

## 2020-11-05 RX ADMIN — RANOLAZINE 500 MG: 500 TABLET, FILM COATED, EXTENDED RELEASE ORAL at 20:21

## 2020-11-05 ASSESSMENT — PAIN SCALES - PAIN ASSESSMENT IN ADVANCED DEMENTIA (PAINAD)
TOTALSCORE: 3
BODYLANGUAGE: 2
BREATHING: 0
FACIALEXPRESSION: 0
BREATHING: 0
CONSOLABILITY: 0
BODYLANGUAGE: 2
BREATHING: 0
BREATHING: 0
NEGVOCALIZATION: 1
CONSOLABILITY: 0
TOTALSCORE: 3
FACIALEXPRESSION: 0
TOTALSCORE: 3
CONSOLABILITY: 0
NEGVOCALIZATION: 1
BODYLANGUAGE: 2
NEGVOCALIZATION: 1
CONSOLABILITY: 0
FACIALEXPRESSION: 0
FACIALEXPRESSION: 0
NEGVOCALIZATION: 1
BREATHING: 0
TOTALSCORE: 0
BODYLANGUAGE: 2
TOTALSCORE: 3
BODYLANGUAGE: 2
FACIALEXPRESSION: 0
CONSOLABILITY: 0
BREATHING: 0
BODYLANGUAGE: 2
TOTALSCORE: 3
TOTALSCORE: 3
NEGVOCALIZATION: 1
CONSOLABILITY: 0
NEGVOCALIZATION: 0
NEGVOCALIZATION: 1
TOTALSCORE: 3
FACIALEXPRESSION: 0
BREATHING: 0
BODYLANGUAGE: 2
BREATHING: 0
FACIALEXPRESSION: 0
CONSOLABILITY: 0
NEGVOCALIZATION: 1
FACIALEXPRESSION: 0
BODYLANGUAGE: 0
CONSOLABILITY: 0

## 2020-11-05 NOTE — PROGRESS NOTES
Comprehensive Nutrition Assessment    Type and Reason for Visit:  Initial, Positive Nutrition Screen    Nutrition Recommendations/Plan:   1. Continue Carb Control diet; Modify to Dental Soft  2. Encourage PO intakes  3. Recommend actual weight  4. Start Ensure High Protein TID  5. Monitor nutrition adequacy, pertinent labs, bowel habits, wt changes, and clinical progress    Nutrition Assessment:  Positve screen for wounds and C/S difficulty. Pt admitted with altered mental status from [address] [facility]. History of COPD, HTN, HLD, DM, CAD, CHF, and CKD III. CT showed probably metastatic lung cancer. Attempted to see pt 3x and no success. Spoke with RN over phone. Has chewing difficutly d/t no teeth. Will downgrade diet to dental soft. [facility] states he was on soft bites and thin liquids. Will trial ONS to optmize nutrition. Nurse is assisting pt in eating. CBW is 225 lb estimated. Recommend obtaining actual weight. Will continue to monitor. Malnutrition Assessment:  Malnutrition Status: At risk for malnutrition (Comment)      Estimated Daily Nutrient Needs:  Energy (kcal):  8614-4255 kcals/day; Weight Used for Energy Requirements:  Ideal(70 kg)     Protein (g):   g/day; Weight Used for Protein Requirements:  Ideal(1.3-1.5 g/kg)        Fluid (ml/day):  1 ml/kcal; Method Used for Fluid Requirements:  1 ml/kcal      Nutrition Related Findings:  poor skin integrity, C difficulty, no teeth, +1 edema,      Wounds:  Pressure Injury, Multiple(traumatic wound)       Current Nutrition Therapies:    DIET CARB CONTROL; Dental Soft  Dietary Nutrition Supplements: Low Calorie High Protein Supplement    Anthropometric Measures:  · Height: 5' 8\" (172.7 cm)  · Current Body Weight: 225 lb (102.1 kg)   · Ideal Body Weight: 154 lbs  · BMI: 34.2  · Adjusted Body Weight:   No Adjustment   · BMI Categories: Obese Class 1 (BMI 30.0-34. 9)       Nutrition Diagnosis:   · Increased nutrient needs related to increase demand for energy/nutrients as evidenced by wounds      Nutrition Interventions:   Food and/or Nutrient Delivery:  Modify Current Diet  Nutrition Education/Counseling:  No recommendation at this time   Coordination of Nutrition Care:  Continue to monitor while inpatient    Goals:  Consume >75% of meals without chewing difficulty       Nutrition Monitoring and Evaluation:      Food/Nutrient Intake Outcomes:  Food and Nutrient Intake  Physical Signs/Symptoms Outcomes:  Chewing or Swallowing, Biochemical Data, Skin     Discharge Planning:     Too soon to determine     Electronically signed by Matias Sanchez, Dietetics student on 11/5/20 at 11:23 AM EST    Contact: [phone]

## 2020-11-05 NOTE — ED NOTES
Straight cathed pt per MD order. Pt tolerated well. Coude catheter had to be used d/t resistance met with straight cath kit catheter. 900mL of clear yellow urine returned. Urine sample collected and sent to lab via tube station.      Ray Saenz RN  11/05/20 [id number]

## 2020-11-05 NOTE — PROGRESS NOTES
checked denied changes in the shape, size or color, bleeding or itching of any of moles, no other c/o. \"    Per HARPREET Longoria) at [address]:   Patient has been at [facility]  for @ 1 year. Initially treating the back wound with xeroform. Recently been treating with Silvadene and dry dressing. As far as Katey Snyder is aware, patient has not been seen by dermatology for quite some time. She reports a one time the wound was positive for MRSA and was treated with antibiotics. Currently the wound is 0.8 cm deep and filled with thick yellow slough. Katey Snyder reports that she was told the patient went to a MD (unknown who) and was told there is no longer cancer in the wound. Katey Jarretttim reports that the family at one point did not want to take the patient for follow up of this wound due to Covid 19 and the patient need to be quarantined if he went out to the MD office. Informed RN that MD ordered santyl for treatment while in the hospital.     Current Wound Care Treatment: foam dressing in place at this time. Patient Goal of Care:  [x] Wound Healing  [] Odor Control  [x] Palliative Care - question family for need for treatment of back wound.   [] Pain Control   [] Other:         PAST MEDICAL HISTORY        Diagnosis Date    Acute MI ([address]     Arthritis     Atrial fibrillation ([address]     CHF (congestive heart failure) (HCC)     CKD (chronic kidney disease), stage III     Cecy Wilkinson MD, [facility]ph[facility], [phone] COPD (chronic obstructive pulmonary disease) ([address]     Diabetes mellitus ([address]     Hyperlipidemia     Hypertension     Pacemaker     ICD       PAST SURGICAL HISTORY    Past Surgical History:   Procedure Laterality Date    ABDOMEN SURGERY  10/15/13    with j tube and lopez tube    BRONCHOSCOPY N/A 10/23/2019    BRONCHOSCOPY DIAGNOSTIC OR CELL [address] ONLY performed by Calin Marquez MD at [address] [facility] sulfate 325 (65 Fe) MG tablet Take 325 mg by mouth 2 times daily      lidocaine (LIDODERM) 5 % Place 1 patch onto the skin daily 12 hours on, 12 hours off.  melatonin 3 MG TABS tablet Take 3 mg by mouth nightly as needed      metOLazone (ZAROXOLYN) 5 MG tablet Take 5 mg by mouth twice a week Monday and thursday      omeprazole (PRILOSEC) 40 MG delayed release capsule Take 40 mg by mouth daily      sennosides-docusate sodium (SENOKOT-S) 8.6-50 MG tablet Take 1 tablet by mouth nightly      insulin glargine (LANTUS) 100 UNIT/ML injection vial Inject 18 Units into the skin nightly 1 vial 3    insulin lispro (HUMALOG) 100 UNIT/ML injection vial Inject 0-3 Units into the skin nightly 1 vial 3    insulin lispro (HUMALOG) 100 UNIT/ML injection vial Inject 4 Units into the skin 3 times daily (with meals) 1 vial 3    insulin lispro (HUMALOG) 100 UNIT/ML injection vial Inject 0-6 Units into the skin 3 times daily (with meals) 1 vial 3    metoclopramide (REGLAN) 10 MG tablet Take 1 tablet by mouth daily (Patient taking differently: Take 5 mg by mouth 4 times daily ) 120 tablet 3    Multiple Vitamins-Minerals (THERAPEUTIC MULTIVITAMIN-MINERALS) tablet Take 1 tablet by mouth daily      Multiple Vitamins-Minerals (CENTRUM SILVER PO) Take 1 tablet by mouth daily      benzonatate (TESSALON) 100 MG capsule Take 100 mg by mouth 3 times daily as needed for Cough      potassium chloride (KLOR-CON M) 20 MEQ extended release tablet Take 10 mEq by mouth daily      allopurinol (ZYLOPRIM) 300 MG tablet Take 300 mg by mouth daily      calcitRIOL (ROCALTROL) 0.25 MCG capsule Take 0.25 mcg by mouth See Admin Instructions On M,W,F      carvedilol (COREG) 3.125 MG tablet Take 1 tablet by mouth 2 times daily 60 tablet 0    polyethylene glycol (GLYCOLAX) packet Take 17 g by mouth every other day          Objective  Lying in bed. Lethargic, difficult to arouse.       BP (!) 155/97   Pulse 80   Temp 97.6 °F (36.4 °C) (Oral) Resp 16   Ht 5' 8\" (1.727 m)   Wt 225 lb (102.1 kg)   SpO2 99%   BMI 34.21 kg/m²     LABS:  WBC:    Lab Results   Component Value Date    WBC 6.5 11/05/2020     H/H:    Lab Results   Component Value Date    HGB 10.5 11/05/2020    HCT 32.2 11/05/2020     PTT:    Lab Results   Component Value Date    APTT 44.0 01/08/2019   [APTT}  PT/INR:    Lab Results   Component Value Date    PROTIME 12.9 11/05/2020    INR 1.11 11/05/2020     HgBA1c:    Lab Results   Component Value Date    LABA1C 6.6 03/05/2020       Assessment  Lower back wound with thick yellow slough present. Sarahy wound slightly red. Wound has 0.8 cm depth. No C/O pain to the area. Left buttocks with a dry scab (starting to fall off). Underneath some granulation tissue. See photo's.    Troy Risk Score: Troy Scale Score: 14    Patient Active Problem List   Diagnosis    Hx perforated duodenal ulcer    Arterial hypotension    Elevated brain natriuretic peptide (BNP) level    Stage 3 chronic kidney disease (HCC)    Chronic normocytic anemia    Cardiomyopathy, ischemic    Cardiac resynchronization therapy defibrillator (CRT-D) in place    IDDM (insulin dependent diabetes mellitus)    Acute renal failure with tubular necrosis ([address]    CAD s/p CABG & stents    Chronic combined systolic (EF 79%) & diastolic (grade 1 LVDD) CHF    Iron deficiency anemia    CKD (chronic kidney disease) stage 3, GFR 30-59 ml/min    COPD (chronic obstructive pulmonary disease) ([facility])    Chronic hypoxemic respiratory failure on 3 L home O2    Hx mucus plugging of bronchi    Right ventricular systolic dysfunction    Acute encephalopathy    Hx UTI due to E. coli & Pseudomonas (Jan 2018)    GRACE-on-CKD3    Septic shock ([address]    Acute on chronic respiratory failure with hypoxemia ([address]    Former smoker    Pacemaker malfunction, initial encounter    Hyperlipidemia    Acute on chronic systolic congestive heart failure (HCC)    Fatigue    PAF (paroxysmal (Active)   Wound Image   11/05/20 1022   Wound Etiology Pressure Unstageable 11/05/20 1022   Dressing Status Clean;Dry; Intact 11/05/20 0530   Wound Cleansed Wound cleanser 11/05/20 1022   Dressing/Treatment Dry dressing; Other (comment) 11/05/20 1022   Dressing Change Due 11/06/20 11/05/20 1022   Wound Length (cm) 3 cm 11/05/20 1022   Wound Width (cm) 1.5 cm 11/05/20 1022   Wound Depth (cm) 0.1 cm 11/05/20 1022   Wound Surface Area (cm^2) 4.5 cm^2 11/05/20 1022   Change in Wound Size % (l*w) 0 11/05/20 1022   Wound Volume (cm^3) 0.45 cm^3 11/05/20 1022   Distance Tunneling (cm) 0 cm 11/05/20 1022   Tunneling Position ___ O'Clock 0 11/05/20 1022   Undermining Starts ___ O'Clock 0 11/05/20 1022   Undermining Ends___ O'Clock 0 11/05/20 1022   Undermining Maxium Distance (cm) 0 11/05/20 1022   Wound Assessment Other (Comment) 11/05/20 1022   Drainage Amount None 11/05/20 1022   Odor None 11/05/20 1022   Sarahy-wound Assessment Dry/flaky 11/05/20 1022   Margins Flat/open edges 11/05/20 1022   Wound Thickness Description not for Pressure Injury Partial thickness 11/05/20 1022   Number of days: 0        Left buttocks:            Response to treatment:  Well tolerated by patient. Pain Assessment:  Severity:  0 / 10  Quality of pain: N/A  Wound Pain Timing/Severity: none  Premedicated: No    Plan   Plan of Care: Wound 04/27/19 Back Lower;Medial-Dressing/Treatment: Pharmaceutical agent (see MAR), Dry dressing(santyl, dry dressing and medipore tape)  Wound 11/05/20 Buttocks Left 3.6 x 2 cm-Dressing/Treatment: Dry dressing, Other (comment)(santyl, dry dressing and medipore tape)     Spoke to Dr Loraine Bazan. MD reports patient now with Metastasis in lungs. Has not talked to family. Updated MD on lower back and buttocks wounds. Order for santyl to see if it will enzymatically debride slough off wound bed. Not sure how aggressive family will be in treating these wounds at this time.     Recommend:  Clean lower back and left buttocks wounds with foamy cleanser or normal saline. Apply santyl (star thick) to left buttocks and lower back. To lower back then apply lightly moist Normal saline soaked 2x2 dressing, cover both wounds with dry dressings and medipore tape. Change daily. Wound care to follow. Call wound care for problems at [phone] or page [phone]     Add low air loss pump to isoflex mattress. Add pressure redistribution pillow to Chair. Specialty Bed Required : Yes   [x] Low Air Loss Please add low air loss mattress to assist with moisture management. [x] Pressure Redistribution  [] Fluid Immersion  [] Bariatric  [] Total Pressure Relief  [] Other:     Current Diet: DIET CARB CONTROL; Dental Soft  Dietary Nutrition Supplements: Low Calorie High Protein Supplement  Dietician consult:  Yes    Discharge Plan:  Placement for patient upon discharge: [facility] care [facility]  Patient appropriate for Outpatient [address] [address]: Yes, recommended to daughter that the patient revisit dermatologist for this wound if feel the need to heal/treat the wound. If palliative care, then will order medication of comfort. Referrals:  []  / discharge planner following  [] [address] [employer] [facility]  [] Supplies  [] Other    Patient/Caregiver Teaching: Spoke to Daughter and updated on treatment for this wound. Discussed options and need to follow up with MD if wanting to treat/heal with wound or if just wanted to allow for comfort. NO decision made today over the phone, just discussed options.    Level of patient/caregiver understanding able to:   [] Indicates understanding       [x] Needs reinforcement  [] Unsuccessful      [] Verbal Understanding  [] Demonstrated understanding       [] No evidence of learning  [] Refused teaching         [] N/A       Electronically signed by Salbador Chaparro, RN, MSN, Laura Morgan on 11/5/2020 at 11:42 AM

## 2020-11-05 NOTE — PROGRESS NOTES
RESPIRATORY THERAPY ASSESSMENT    Name:  Jamie Goddard Record Number:  [id number]  Age: 81 y.o. Gender: male  : 1935  Today's Date:  2020  Room:  0328/0328-01    Assessment     Is the patient being admitted for a COPD or Asthma exacerbation? No   (If yes the patient will be seen every 4 hours for the first 24 hours and then reassessed)    Patient Admission Diagnosis      Allergies  Allergies   Allergen Reactions    Iv [Iodides]        Minimum Predicted Vital Capacity:     na          Actual Vital Capacity:      na              Pulmonary History:COPD  Home Oxygen Therapy:  room air  Home Respiratory Therapy:duo neb hhn prn, symbicort bid   Current Respiratory Therapy:  Duo neb hhn tid, symbicort bid albuterol prn          Respiratory Severity Index(RSI)   Patients with orders for inhalation medications, oxygen, or any therapeutic treatment modality will be placed on Respiratory Protocol. They will be assessed with the first treatment and at least every 72 hours thereafter. The following severity scale will be used to determine frequency of treatment intervention.     Smoking History: Pulmonary Disease or Smoking History, Greater than 15 pack year = 2    Social History  Social History     Tobacco Use    Smoking status: Former Smoker     Packs/day: 2.00     Years: 60.00     Pack years: 120.00     Types: Cigarettes     Last attempt to quit: 10/14/2013     Years since quittin.0    Smokeless tobacco: Never Used   Substance Use Topics    Alcohol use: No    Drug use: No       Recent Surgical History: None = 0  Past Surgical History  Past Surgical History:   Procedure Laterality Date    ABDOMEN SURGERY  10/15/13    with j tube and lopez tube    BRONCHOSCOPY N/A 10/23/2019    BRONCHOSCOPY DIAGNOSTIC OR CELL [address] ONLY performed by Leslie Chavez MD at [place] [address]  4/10/2013    Biventricular ICD Medtronic    CORONARY ARTERY BYPASS GRAFT      SKIN BIOPSY Level of Consciousness: Alert, Follows Commands but Disoriented = 1    Level of Activity: Walking with assistance = 1    Respiratory Pattern: Regular Pattern; RR 8-20 = 0    Breath Sounds: Clear = 0    Sputum   ,  ,    Cough: Strong, spontaneous, non-productive = 0    Vital Signs   BP (!) 151/76   Pulse 83   Temp 97.9 °F (36.6 °C) (Axillary)   Resp 16   Ht 5' 8\" (1.727 m)   Wt 225 lb (102.1 kg)   SpO2 100%   BMI 34.21 kg/m²   SPO2 (COPD values may differ): 90-91% on room air or greater than 92% on FiO2 24- 28% = 1    Peak Flow (asthma only): not applicable = 0    RSI: 0-4 = See once and convert to home regimen or discontinue        Plan       Goals:     Patient/caregiver was educated on the proper method of use for Respiratory Care Devices:  Yes      Level of patient/caregiver understanding able to:   ? Verbalize understanding   ? Demonstrate understanding       ? Teach back        ? Needs reinforcement       ? No available caregiver               ? Other:     Response to education:  Excellent     Is patient being placed on Home Treatment Regimen? Yes     Does the patient have everything they need prior to discharge? NA     Comments: home regimen    Plan of Care: change to prn home regimen    Electronically signed by Vivian Ghotra RCP on 11/5/2020 at 7:59 AM    Respiratory Protocol Guidelines     1. Assessment and treatment by Respiratory Therapy will be initiated for medication and therapeutic interventions upon initiation of aerosolized medication. 2. Physician will be contacted for respiratory rate (RR) greater than 35 breaths per minute. Therapy will be held for heart rate (HR) greater than 140 beats per minute, pending direction from physician. 3. Bronchodilators will be administered via Metered Dose Inhaler (MDI) with spacer when the following criteria are met:  a.  Alert and cooperative     b. HR < 140 bpm  c. RR < 30 bpm                d. Can demonstrate a 2-3 second inspiratory hold  4. Bronchodilators will be administered via Hand Held Nebulizer [facility]) to patients when ANY of the following criteria are met  a. Incognizant or uncooperative          b. Patients treated with HHN at Home        c. Unable to demonstrate proper use of MDI with spacer     d. RR > 30 bpm   5. Bronchodilators will be delivered via Metered Dose Inhaler (MDI), HHN, Aerogen to intubated patients on mechanical ventilation. 6. Inhalation medication orders will be delivered and/or substituted as outlined below. Aerosolized Medications Ordering and Administration Guidelines:    1. All Medications will be ordered by a physician, and their frequency and/or modality will be adjusted as defined by the patients Respiratory Severity Index (RSI) score. 2. If the patient does not have documented COPD, consider discontinuing anticholinergics when RSI is less than 9.  3. If the bronchospasm worsens (increased RSI), then the bronchodilator frequency can be increased to a maximum of every 4 hours. If greater than every 4 hours is required, the physician will be contacted. 4. If the bronchospasm improves, the frequency of the bronchodilator can be decreased, based on the patient's RSI, but not less than home treatment regimen frequency. 5. Bronchodilator(s) will be discontinued if patient has a RSI less than 9 and has received no scheduled or as needed treatment for 72  Hrs. Patients Ordered on a Mucolytic Agent:    1. Must always be administered with a bronchodilator. 2. Discontinue if patient experiences worsened bronchospasm, or secretions have lessened to the point that the patient is able to clear them with a cough. Anti-inflammatory and Combination Medications:    1. If the patient lacks prior history of lung disease, is not using inhaled anti-inflammatory medication at home, and lacks wheezing by examination or by history for at least 24 hours, contact physician for possible discontinuation.

## 2020-11-05 NOTE — ED NOTES
Fall risk screening completed. Fall risk bracelet applied to patient. Non-skid socks provided and placed on patient. The fall risk is indicated using  dome light . Based on score, a bed alarm was indicated and applied. The call light is within the patient's reach, and instructions for use were provided. The bed is in the lowest position with wheels locked. The patient has been advised to notify staff, using the call light, if there is a need to get up or use restroom. The patient verbalized understanding of safety precautions and how to contact staff for assistance.        Shamar You RN  11/05/20 [id number]

## 2020-11-05 NOTE — ED PROVIDER NOTES
[facility] C3 TELE/MED SURG/ONC  eMERGENCY dEPARTMENTeNCOUnter      Pt Name: David Wyatt  MRN: [id number]  [place] 1935  Date of evaluation: 11/5/2020  Provider: Brittney Campoverde MD    CHIEF COMPLAINT       Chief Complaint   Patient presents with    Altered Mental Status     patient sent to ED from [facility] due to stat chest showing congerstion, decreased LOC and AMS. HISTORY OF PRESENT ILLNESS   (Location/Symptom, Timing/Onset,Context/Setting, Quality, Duration, Modifying Factors, Severity)  Note limiting factors. David Wyatt is a 81 y.o. male who presents to the emergency department for altered mental status and congestion on his chest x-ray. History is difficult to obtain from the patient certain if there is history of dementia. The patient initially states that he is fine. He later states that he does not know where he is. Patient was sent from his nursing facility due to altered mental status and \"congestion\" on his chest x-ray. Nursing notes were reviewed.     REVIEW OF SYSTEMS    (2-9 systems for level 4, 10 or more for level 5)     Review of Systems    Unable to obtain secondary to medical condition  PAST MEDICAL HISTORY     Past Medical History:   Diagnosis Date    Acute MI ([address]     Arthritis     Atrial fibrillation ([address]     CHF (congestive heart failure) (HCC)     CKD (chronic kidney disease), stage III     Jennifer Muñoz MD, [facility]ph[facility], [phone] COPD (chronic obstructive pulmonary disease) ([address]     Diabetes mellitus ([address]     Hyperlipidemia     Hypertension     Pacemaker     ICD         SURGICALHISTORY       Past Surgical History:   Procedure Laterality Date    ABDOMEN SURGERY  10/15/13    with j tube and lopez tube    BRONCHOSCOPY N/A 10/23/2019    BRONCHOSCOPY DIAGNOSTIC OR CELL [address] ONLY performed by Yuri Angel MD at [place] [address]  4/10/2013    Biventricular ICD Medtronic    CORONARY ARTERY BYPASS GRAFT      SKIN BIOPSY           CURRENT MEDICATIONS       Current Discharge Medication List      CONTINUE these medications which have NOT CHANGED    Details   fluticasone-vilanterol (BREO ELLIPTA) 100-25 MCG/INH AEPB inhaler Inhale 1 puff into the lungs daily      nitroGLYCERIN (NITROSTAT) 0.4 MG SL tablet up to max of 3 total doses. If no relief after 1 dose, call 911. Qty: 25 tablet, Refills: 3      traZODone (DESYREL) 100 MG tablet Take 150 mg by mouth nightly       apixaban (ELIQUIS) 2.5 MG TABS tablet Take 1 tablet by mouth 2 times daily  Qty: 60 tablet, Refills: 2      ranolazine (RANEXA) 500 MG extended release tablet Take 500 mg by mouth 2 times daily      budesonide-formoterol (SYMBICORT) 160-4.5 MCG/ACT AERO Inhale 2 puffs into the lungs 2 times daily      albuterol (PROVENTIL HFA;VENTOLIN HFA) 108 (90 BASE) MCG/ACT inhaler Inhale 2 puffs into the lungs every 6 hours as needed. ipratropium-albuterol (DUONEB) 0.5-2.5 (3) MG/3ML SOLN nebulizer solution Inhale 1 vial into the lungs 3 times daily       atorvastatin (LIPITOR) 20 MG tablet Take 20 mg by mouth daily.       torsemide (DEMADEX) 20 MG tablet Take 5 tablets by mouth daily  Qty: 30 tablet, Refills: 2      Menthol, Topical Analgesic, (BIOFREEZE) 4 % GEL Apply topically every 8 hours as needed      ferrous sulfate 325 (65 Fe) MG tablet Take 325 mg by mouth 2 times daily      lidocaine (LIDODERM) 5 % Place 1 patch onto the skin daily 12 hours on, 12 hours off.      melatonin 3 MG TABS tablet Take 3 mg by mouth nightly as needed      metOLazone (ZAROXOLYN) 5 MG tablet Take 5 mg by mouth twice a week Monday and thursday      omeprazole (PRILOSEC) 40 MG delayed release capsule Take 40 mg by mouth daily      sennosides-docusate sodium (SENOKOT-S) 8.6-50 MG tablet Take 1 tablet by mouth nightly      insulin glargine (LANTUS) 100 UNIT/ML injection vial Inject 18 Units into the skin nightly  Qty: 1 vial, Refills: 3      !! insulin lispro (HUMALOG) 100 UNIT/ML Transportation needs     Medical: None     Non-medical: None   Tobacco Use    Smoking status: Former Smoker     Packs/day: 2.00     Years: 60.00     Pack years: 120.00     Types: Cigarettes     Last attempt to quit: 10/14/2013     Years since quittin.0    Smokeless tobacco: Never Used   Substance and Sexual Activity    Alcohol use: No    Drug use: No    Sexual activity: Not Currently   Lifestyle    Physical activity     Days per week: None     Minutes per session: None    Stress: None   Relationships    Social connections     Talks on phone: None     Gets together: None     Attends Hindu service: None     Active member of club or organization: None     Attends meetings of clubs or organizations: None     Relationship status: None    Intimate partner violence     Fear of current or ex partner: None     Emotionally abused: None     Physically abused: None     Forced sexual activity: None   Other Topics Concern    None   Social History Narrative    None       SCREENINGS    [place] Coma Scale  Eye Opening: Spontaneous  Best Verbal Response: Oriented  Best Motor Response: Obeys commands  Remigio Coma Scale Score: 15        PHYSICAL EXAM    (up to 7 for level 4, 8 or more for level 5)     ED Triage Vitals   BP Temp Temp Source Pulse Resp SpO2 Height Weight   20 0053 20 0053 20 0053 20 0053 20 0053 20 0053 20 0108 20 0108   120/67 98.2 °F (36.8 °C) Oral 88 16 100 % 5' 8\" (1.727 m) 225 lb (102.1 kg)       Physical Exam  Vitals signs and nursing note reviewed. Constitutional:       Appearance: Normal appearance. He is well-developed. He is not ill-appearing. HENT:      Head: Normocephalic and atraumatic. Right Ear: External ear normal.      Left Ear: External ear normal.      Nose: Nose normal.   Eyes:      General: No scleral icterus. Right eye: No discharge. Left eye: No discharge.       Conjunctiva/sclera: Conjunctivae normal. Neck:      Musculoskeletal: Neck supple. Cardiovascular:      Rate and Rhythm: Normal rate and regular rhythm. Heart sounds: Normal heart sounds. Pulmonary:      Effort: Pulmonary effort is normal. No respiratory distress. Breath sounds: Normal breath sounds. No wheezing or rales. Abdominal:      General: Bowel sounds are normal.      Tenderness: There is no abdominal tenderness. Skin:     Coloration: Skin is not pale. Neurological:      Mental Status: He is alert. He is disoriented. GCS: GCS eye subscore is 4. GCS verbal subscore is 5. GCS motor subscore is 6. Cranial Nerves: No cranial nerve deficit or dysarthria. Psychiatric:         Mood and Affect: Mood normal.         Behavior: Behavior normal.           DIAGNOSTIC RESULTS     EKG: All EKG's are interpreted by the Emergency Department Physician who either signs or Co-signs this chart in the absence of a cardiologist.    12 lead EKG shows electronically paced rhythm at a rate of 87 bpm, prolonged QRS QTC and TX intervals. No acute ischemic findings. Tachycardia improved when compared to previous EKG March 2020. RADIOLOGY:   Non-plain film images such as CT, Ultrasound and MRI are read by the radiologist. Plain radiographic images are visualized and preliminarily interpreted by the emergency physician with the below findings:        Interpretation per the Radiologist below, if available at the time of this note:    CT CHEST WO CONTRAST   Final Result   1. Innumerable bilateral pulmonary nodules, favored to represent metastatic   disease. 2. Bilateral lower lobe atelectasis or, less likely, pneumonia. 3. Mediastinal lymph nodes are indeterminate. CT Head WO Contrast   Preliminary Result   No acute intracranial abnormality. Age related changes including chronic small vessel ischemic disease and   cerebral atrophy.          XR CHEST PORTABLE   Final Result   Bilateral airspace opacities may represent pneumonia though there is also a   suggestion of nodularity and a CT scan of the chest is recommended.                ED BEDSIDE ULTRASOUND:   Performed by ED Physician - none    LABS:  Labs Reviewed   CBC WITH AUTO DIFFERENTIAL - Abnormal; Notable for the following components:       Result Value    RBC 3.49 (*)     Hemoglobin 10.5 (*)     Hematocrit 32.2 (*)     RDW 17.8 (*)     All other components within normal limits    Narrative:     Performed at:  [address], [facility] [employer]   Phone [phone]   URINE RT REFLEX TO CULTURE - Abnormal; Notable for the following components:    Blood, Urine SMALL (*)     Leukocyte Esterase, Urine SMALL (*)     All other components within normal limits    Narrative:     Performed at:  [address], [facility] [employer]   Phone [phone]   COMPREHENSIVE METABOLIC PANEL W/ REFLEX TO MG FOR LOW K - Abnormal; Notable for the following components:    Glucose 155 (*)     BUN 45 (*)     CREATININE 1.8 (*)     GFR Non- 36 (*)     GFR  44 (*)     All other components within normal limits    Narrative:     Performed at:  [address], [facility] [employer]   Phone [phone]   TROPONIN - Abnormal; Notable for the following components:    Troponin 0.03 (*)     All other components within normal limits    Narrative:     Performed at:  [address], [facility] [employer]   Phone [phone] - Abnormal; Notable for the following components:    Pro-BNP 1,258 (*)     All other components within normal limits    Narrative:     Performed at:  [address], [facility] [employer]   Phone [phone]   MICROSCOPIC URINALYSIS - Abnormal; Notable for the following components:    WBC, UA 10-20 (*)     Bacteria, UA Rare (*)     All other components within normal limits    Narrative:     Performed at:  [address], [facility]   Phone [phone]   CULTURE, URINE   CULTURE, WOUND   LACTATE, SEPSIS    Narrative:     Performed at:  [address], [facility]   Phone [phone]    Narrative:     Performed at:  [facility]) [address], [facility]   Phone [phone]   POCT GLUCOSE   POCT GLUCOSE   POCT GLUCOSE       All other labs were within normal range or not returned as of this dictation. EMERGENCY DEPARTMENT COURSE and DIFFERENTIAL DIAGNOSIS/MDM:   Vitals:    Vitals:    11/05/20 0408 11/05/20 0439 11/05/20 0518 11/05/20 0530   BP: 114/64 101/65 136/73 (!) 151/76   Pulse: 91 80 87 83   Resp: 18 18 17 16   Temp:    97.9 °F (36.6 °C)   TempSrc:    Axillary   SpO2: 98% 100% 95% 100%   Weight:       Height:           Elderly male who comes in the nursing facility for altered mental status and chest congestion. Laboratory studies chest x-ray ordered. Questionable nodules on the chest x-rays or CT scan ordered which shows multiple pulmonary nodules concerning for metastatic disease. I do not see any documented history of this in the chart. I do think this patient should be admitted to the hospital for further work-up. I spoke to the admitting provider who does agree and has agreed admit their service. CRITICAL CARE TIME   None       CONSULTS:  IP CONSULT TO HOSPITALIST  IP CONSULT TO PULMONOLOGY    PROCEDURES:  Unless otherwise noted above, none     Procedures    FINAL IMPRESSION      1. Multiple lung nodules on CT    2.  Altered mental status, unspecified altered mental status type          DISPOSITION/PLAN   DISPOSITION Admitted 11/05/2020 04:44:21 AM      PATIENT REFERREDTO:  No follow-up provider specified.     DISCHARGEMEDICATIONS:  Current Discharge Medication List             (Please note that portions of this note were completed with a voice recognition program.  Efforts were made to edit the dictations but occasionally words are mis-transcribed.)    Drake Casillas MD (electronically signed)  Attending Emergency Physician       Drake Casillas MD  11/05/20 8245

## 2020-11-05 NOTE — H&P
Hospital Medicine History & Physical      PCP: Susanna Lantigua    Date of Admission: 11/5/2020    Date of Service: Pt seen/examined on 11/05/20 and Admitted to Inpatient with expected LOS greater than two midnights due to medical therapy. Chief Complaint:  Altered mental status    History Of Present Illness: The patient is an 81 Y M with a h/o former smoking (120 pack-years), COPD, HTN, HLD, DM2, CAD s/p CABG, ischemic CMP, CHF, Afib, and CKD3. He lives at the [address] [place] [address],[address]. He was reportedly sent here overnight because of altered mental status and BLE edema. Labs and vitals were somewhat unremarkable, but a CXR in the ED showed pulmonary nodules, and then a CT showed probable metastatic cancer. The patient was admitted to hospital medicine overnight. He had no complaints but was confused and lethargic. He appears comfortable. Not breathing heavily or coughing. He can only tell me his name, then falls back asleep. Past Medical History:          Diagnosis Date    Acute MI ([address]     Arthritis     Atrial fibrillation ([address]     CHF (congestive heart failure) (HCC)     CKD (chronic kidney disease), stage III     Yani Johnson MD, [facility], [phone]    COPD (chronic obstructive pulmonary disease) [facility]     Diabetes mellitus ([address]     Hyperlipidemia     Hypertension     Pacemaker     ICD       Past Surgical History:          Procedure Laterality Date    ABDOMEN SURGERY  10/15/13    with j tube and lopez tube    BRONCHOSCOPY N/A 10/23/2019    BRONCHOSCOPY DIAGNOSTIC OR CELL [address] ONLY performed by Ravi Escobar MD at [place] [address]  4/10/2013    Biventricular ICD Medtronic    CORONARY ARTERY BYPASS GRAFT      SKIN BIOPSY         Medications Prior to Admission:      Prior to Admission medications    Medication Sig Start Date End Date Taking?  Authorizing Provider   fluticasone-vilanterol (BREO ELLIPTA) 100-25 MCG/INH AEPB inhaler Inhale 1 puff into the lungs daily   Yes Historical Provider, MD   nitroGLYCERIN (NITROSTAT) 0.4 MG SL tablet up to max of 3 total doses. If no relief after 1 dose, call 911. 10/27/19  Yes Hector Corbin MD   traZODone (DESYREL) 100 MG tablet Take 150 mg by mouth nightly    Yes Historical Provider, MD   apixaban (ELIQUIS) 2.5 MG TABS tablet Take 1 tablet by mouth 2 times daily 1/11/19  Yes Jeffery Bass MD   ranolazine (RANEXA) 500 MG extended release tablet Take 500 mg by mouth 2 times daily   Yes Historical Provider, MD   budesonide-formoterol (SYMBICORT) 160-4.5 MCG/ACT AERO Inhale 2 puffs into the lungs 2 times daily   Yes Historical Provider, MD   albuterol (PROVENTIL HFA;VENTOLIN HFA) 108 (90 BASE) MCG/ACT inhaler Inhale 2 puffs into the lungs every 6 hours as needed. Yes Historical Provider, MD   ipratropium-albuterol (DUONEB) 0.5-2.5 (3) MG/3ML SOLN nebulizer solution Inhale 1 vial into the lungs 3 times daily    Yes Historical Provider, MD   atorvastatin (LIPITOR) 20 MG tablet Take 20 mg by mouth daily. Yes Historical Provider, MD   torsemide (DEMADEX) 20 MG tablet Take 5 tablets by mouth daily 3/9/20   LISSETTE Bonner - CNP   Menthol, Topical Analgesic, (BIOFREEZE) 4 % GEL Apply topically every 8 hours as needed    Historical Provider, MD   ferrous sulfate 325 (65 Fe) MG tablet Take 325 mg by mouth 2 times daily    Historical Provider, MD   lidocaine (LIDODERM) 5 % Place 1 patch onto the skin daily 12 hours on, 12 hours off.     Historical Provider, MD   melatonin 3 MG TABS tablet Take 3 mg by mouth nightly as needed    Historical Provider, MD   metOLazone (ZAROXOLYN) 5 MG tablet Take 5 mg by mouth twice a week Monday and thursday    Historical Provider, MD   omeprazole (PRILOSEC) 40 MG delayed release capsule Take 40 mg by mouth daily    Historical Provider, MD   sennosides-docusate sodium (SENOKOT-S) 8.6-50 MG tablet Take 1 tablet by mouth nightly    Historical Provider, MD insulin glargine (LANTUS) 100 UNIT/ML injection vial Inject 18 Units into the skin nightly 10/27/19   Shay Shin MD   insulin lispro (HUMALOG) 100 UNIT/ML injection vial Inject 0-3 Units into the skin nightly 10/27/19   Shay Shin MD   insulin lispro (HUMALOG) 100 UNIT/ML injection vial Inject 4 Units into the skin 3 times daily (with meals) 10/27/19   Shay Shin MD   insulin lispro (HUMALOG) 100 UNIT/ML injection vial Inject 0-6 Units into the skin 3 times daily (with meals) 10/27/19   Shay Shin MD   metoclopramide (REGLAN) 10 MG tablet Take 1 tablet by mouth daily  Patient taking differently: Take 5 mg by mouth 4 times daily  10/28/19   Shay Shin MD   Multiple Vitamins-Minerals (THERAPEUTIC MULTIVITAMIN-MINERALS) tablet Take 1 tablet by mouth daily 10/28/19   Shay Shin MD   Multiple Vitamins-Minerals (CENTRUM SILVER PO) Take 1 tablet by mouth daily    Historical Provider, MD   benzonatate (TESSALON) 100 MG capsule Take 100 mg by mouth 3 times daily as needed for Cough    Historical Provider, MD   potassium chloride (KLOR-CON M) 20 MEQ extended release tablet Take 10 mEq by mouth daily    Historical Provider, MD   allopurinol (ZYLOPRIM) 300 MG tablet Take 300 mg by mouth daily    Historical Provider, MD   calcitRIOL (ROCALTROL) 0.25 MCG capsule Take 0.25 mcg by mouth See Admin Instructions On M,W,F    Historical Provider, MD   carvedilol (COREG) 3.125 MG tablet Take 1 tablet by mouth 2 times daily 5/24/18 3/5/20  Mark Welch MD   polyethylene glycol [facility]) packet Take 17 g by mouth every other day     Historical Provider, MD       Allergies: Iv [iodides]    Social History:      The patient currently lives at [address]:   reports that he quit smoking about 7 years ago. His smoking use included cigarettes. He has a 120.00 pack-year smoking history. He has never used smokeless tobacco.  ETOH:   reports no history of alcohol use.   E-Cigarettes Vaping or 11/05/20  0125      K 4.6      CO2 29   BUN 45*   CREATININE 1.8*   CALCIUM 9.2     Recent Labs     11/05/20  0125   AST 27   ALT 15   BILITOT <0.2   ALKPHOS 81     No results for input(s): INR in the last 72 hours. Recent Labs     11/05/20  0125   TROPONINI 0.03*       Urinalysis:      Lab Results   Component Value Date    NITRU Negative 11/05/2020    WBCUA 10-20 11/05/2020    BACTERIA Rare 11/05/2020    RBCUA 3-4 11/05/2020    BLOODU SMALL 11/05/2020    SPECGRAV 1.015 11/05/2020    GLUCOSEU Negative 11/05/2020       Radiology:     CXR: I have reviewed the CXR with the following interpretation: nodules  EKG:  I have reviewed the EKG with the following interpretation: v-paced    CT CHEST WO CONTRAST   Final Result   1. Innumerable bilateral pulmonary nodules, favored to represent metastatic   disease. 2. Bilateral lower lobe atelectasis or, less likely, pneumonia. 3. Mediastinal lymph nodes are indeterminate. CT Head WO Contrast   Preliminary Result   No acute intracranial abnormality. Age related changes including chronic small vessel ischemic disease and   cerebral atrophy. XR CHEST PORTABLE   Final Result   Bilateral airspace opacities may represent pneumonia though there is also a   suggestion of nodularity and a CT scan of the chest is recommended. ASSESSMENT:    Active Hospital Problems    Diagnosis Date Noted    Multiple lung nodules on CT [R91.8] 11/05/2020    Multiple lung nodules [R91.8] 11/05/2020         PLAN:      Probable metastatic lung cancer  - reviewed prior imaging as well. Pulmonary consulted to guide biopsy and help determine when this workup can transition to the outpatient setting. Acute urinary retention complicated by acute cystitis  - straight catheterized for 900 ml in the ED. Monitor. Tamsulosin. - ceftriaxone started 11/5, f/u urine culture. Acute metabolic encephalopathy  - due to above issues, treat accordingly.   - f/u MRI brain with and without contrast if possible  - held trazodone    Chronic systolic CHF  - EF 39% two years ago. - torsemide, carvedilol  - he is not on ACE-I or spironolactone due to tenuous renal function. Consider addition of these as outpatient when stable. COPD, with chronic hypoxic respiratory failure  - inhaled bronchodilators  - he seems to be on 3L NC chronically. CKD3  - baseline Cr probably about 1.8. Monitor. Afib  - held apixaban in anticipation of biopsy  - carvedilol  - he also has AICD and is v-paced    Trop elevation  - likely due to known CAD, CHF, in the setting of GRAEC. Trend. No further workup anticipated. - statin, carvedilol, ranolazine    DM2  - A1c has been about 6.5 for years. Adjusted insulin regimen while here. F/u wound culture. Questionable significance. DVT Prophylaxis: SCDs, otherwise anticoagulation as above  Diet: DIET GENERAL;  Code Status: Full Code    PT/OT Eval Status: not indicated    Dispo - likely back to ECF without PT/OT. Perhaps 11/7, hopefully after biopsy, pending pulmonary input. Jarret Schafer MD    Thank you Jeramie Dominguez for the opportunity to be involved in this patient's care.  If you have any questions or concerns please feel free to contact me at [phone].,

## 2020-11-05 NOTE — ED NOTES
Bedside report given to Klaudia Mata, [facility]. Pt transferred to room C328 via stretcher at this time in stable condition. All paperwork and belongings sent with pt. Care transferred.      Anthony Tobias RN  11/05/20 0752

## 2020-11-05 NOTE — PLAN OF CARE
Nutrition Problem #1: Increased nutrient needs  Intervention: Food and/or Nutrient Delivery: Modify Current Diet  Nutritional Goals: Consume >75% of meals without chewing difficulty

## 2020-11-05 NOTE — PLAN OF CARE
Pt turned Q 2 hours with pillow support, heels off of bed. Wounds to spine and sacrum- Dressing changed per orders.

## 2020-11-05 NOTE — CARE COORDINATION
CASE MANAGEMENT INITIAL ASSESSMENT    Reviewed chart and completed assessment via telephone with: Patient confused- call placed to wife. Wife and dtr Jonathan Barksdale assisted to complete initial assessment      Explained Case Management role/services. Primary contact information: Abel Carbajal [phone]    Health Care Decision Maker: Reviewed   Who do you trust or have selected to make healthcare decisions for you? Name:   Demetri Walsh- wife   Phone  Number: [phone]  Can this person be reached and be able to respond quickly, such as within a few minutes or hours? No- screens call best to call dtr 1st   Who would be your back-up decision maker? Name Jonathan Barksdale- dtr   Phone Number:[phone]    Admit date/status:11/5/2020- Inpatient   Diagnosis: Multiple lung nodules on CT   Is this a Readmission?:  No      Insurance:BCBS Medicare, Medicaid secondary    Precert required for SNF: Yes, if return skilled. No if return under LTC benefit        3 night stay required: No    Living arrangements, Adls, care needs, prior to admission:Living LTC at [facility], uses [address] for ambulation. LTC does provide rt maintance able to walk short distances with staff assistance     Transportation: Dtr provided to appointments      Durable Medical Equipment at home:  Walker__Cane__RTS__ BSC__Shower Chair__  02__ HHN__ CPAP__  BiPap__  Hospital Bed__ W/C_x__ Other__________    Services in the home and/or outpatient, prior to admission:in ECF    PT/OT recs: Will follow for [facility] Exemption Notification (HEN):NA    Barriers to discharge: Per Jimbo Baez at [facility] no barrier will need rapid COVID day of dc. Plan/comments: Per family Patient will return to [facility], will need rapid COVID day of dc.  KULDEEP Franklin         ECOC on chart for MD signature

## 2020-11-05 NOTE — PROGRESS NOTES
11/05/20 1215   Oxygen Therapy/Pulse Ox   O2 Therapy Room air   Blood Gas  Performed? Yes   $ABG $ABG   Kvng's Test #1 Pos   Site #1 Left Radial   Site Prepped #1 Yes   Number of Attempts #1 1   Pressure Held #1 Yes   Complications #1 None   Post-procedure #1 Standard   Specimen Status #1 To lab   How Tolerated?  Tolerated well

## 2020-11-05 NOTE — CONSULTS
Pulmonary & Critical Care Consultation Note   Jesús Hardy MD    REASONFOR CONSULTATION/CC: pulmonary nodules    Consult at request of  Roberto Lamb MD  PCP: Ag Hendrickson    HISTORYOF PRESENT ILLNESS:    81y.o. year old male former smoker, COPD, follows with Dr. Colby Duran     Was brought to ED overnight due to acute delirium, workup included chest imaging showing multiple pulm nodules suggestive of metastatic malignancy and prompting our input. He was somnolent during my discussions with him this morning and history limited, but he denies any respiratory symptoms above his baseline including cough, hemoptysis, congestion, chest pain, or shortness of breath. Had a CT chest in December indicating mediastinal adenopathy but not specifically commenting on these nodules. Past Medical History:   Diagnosis Date    Acute MI ([address]     Arthritis     Atrial fibrillation ([address]     CHF (congestive heart failure) (HCC)     CKD (chronic kidney disease), stage III     Patrick Stearns MD, [facility], [phone]    [facility] (ch[facility] disease) [facility]     Diabetes mellitus ([address]     Hyperlipidemia     Hypertension     Pacemaker     ICD       Past Surgical History:   Procedure Laterality Date    ABDOMEN SURGERY  10/15/13    with j tube and lopez tube    BRONCHOSCOPY N/A 10/23/2019    BRONCHOSCOPY DIAGNOSTIC OR CELL [facility] ONLY performed by [facility] Leon MD at [facility]         family history includes Asthma in his mother; Coronary Art Dis in his brother, mother, and sister; Diabetes in his father and sister; High Blood Pressure in his father; Stroke in his father.     Social History     Tobacco Use    Smoking status: Former Smoker     Packs/day: 2.00     Years: 60.00     Pack years: 120.00     Types: Cigarettes     Last attempt to quit: 10/14/2013     Years since quittin.0    Smokeless tobacco: Never Used Substance Use Topics    Alcohol use: No        Scheduled Meds:   allopurinol  300 mg Oral Daily    [Held by provider] apixaban  2.5 mg Oral BID    atorvastatin  20 mg Oral Daily    budesonide-formoterol  2 puff Inhalation BID    carvedilol  3.125 mg Oral BID    ferrous sulfate  325 mg Oral BID    insulin glargine  18 Units Subcutaneous Nightly    metoclopramide  5 mg Oral 4x Daily    pantoprazole  40 mg Oral QAM AC    ranolazine  500 mg Oral BID    sennosides-docusate sodium  1 tablet Oral Nightly    torsemide  100 mg Oral Daily    potassium chloride  10 mEq Oral Daily    insulin lispro  0-6 Units Subcutaneous TID WC    insulin lispro  0-3 Units Subcutaneous Nightly    sodium chloride flush  10 mL Intravenous 2 times per day    influenza virus vaccine  0.5 mL Intramuscular Prior to discharge    cefTRIAXone (ROCEPHIN) IV  1 g Intravenous Q24H    tamsulosin  0.4 mg Oral Daily       Continuous Infusions:   dextrose         PRN Meds:   albuterol sulfate HFA, melatonin ER, glucose, dextrose, glucagon (rDNA), dextrose, sodium chloride flush, acetaminophen **OR** acetaminophen, polyethylene glycol, promethazine **OR** ondansetron, ipratropium-albuterol   Consult to Pulmonology  Consult performed by: Tawanna Gusman MD  Consult ordered by: Jerone Cooks, MD        ALLERGIES:  Patient is allergic to iv [iodides]. REVIEW OF SYSTEMS:  Review of Systems   Unable to perform ROS: Mental status change       Objective:   PHYSICAL EXAM:  BP (!) 151/76   Pulse 83   Temp 97.9 °F (36.6 °C) (Axillary)   Resp 16   Ht 5' 8\" (1.727 m)   Wt 225 lb (102.1 kg)   SpO2 100%   BMI 34.21 kg/m²    Physical Exam  Constitutional:       General: He is not in acute distress. Appearance: He is well-developed. He is not diaphoretic. HENT:      Head: Normocephalic and atraumatic. Mouth/Throat:      Pharynx: No oropharyngeal exudate. Eyes:      Pupils: Pupils are equal, round, and reactive to light. Neck:      Musculoskeletal: Neck supple. Vascular: No JVD. Cardiovascular:      Heart sounds: Normal heart sounds. No murmur. No friction rub. No gallop. Pulmonary:      Effort: Pulmonary effort is normal.      Breath sounds: No wheezing or rales. Abdominal:      General: Bowel sounds are normal. There is no distension. Palpations: Abdomen is soft. Tenderness: There is no abdominal tenderness. Musculoskeletal: Normal range of motion. Lymphadenopathy:      Cervical: No cervical adenopathy. Skin:     General: Skin is warm and dry. Findings: No rash. Neurological:      Mental Status: He is alert. Cranial Nerves: No cranial nerve deficit. Comments: CN 2-12 grossly intact            Data Reviewed:   LABS:  CBC:   Recent Labs     11/05/20  0125   WBC 6.5   HGB 10.5*   HCT 32.2*   MCV 92.3        BMP:   Recent Labs     11/05/20  0125      K 4.6      CO2 29   BUN 45*   CREATININE 1.8*     LIVER PROFILE:   Recent Labs     11/05/20 0125   AST 27   ALT 15   BILITOT <0.2   ALKPHOS 81     PT/INR: No results for input(s): PROTIME, INR in the last 72 hours. APTT:No results for input(s): APTT in the last 72 hours. UA:  Recent Labs     11/05/20  0158   COLORU Yellow   PHUR 6.0   WBCUA 10-20*   RBCUA 3-4   BACTERIA Rare*   CLARITYU Clear   SPECGRAV 1.015   LEUKOCYTESUR SMALL*   UROBILINOGEN 0.2   BILIRUBINUR Negative   BLOODU SMALL*   GLUCOSEU Negative     No results for input(s): PHART, [id number], PO2ART in the last 72 hours. Vent Information  SpO2: 100 %    CT chest personally reviewed, scattered pulmonary nodules without notable mediastinal adenopathy          Assessment:     1. Multiple pulm nodules suggestive of metastatic malignancy etiology  2. COPD without acute exac  3. Acute encephalopathy, unclear etiology  4. CKD  5.  Afib on anticoagulation    Plan:      -Discussed nodule findings with the patient, will set up for a CT guided biopsy (I do not think

## 2020-11-05 NOTE — PROGRESS NOTES
Spoke with daughter Freida Bray [facility]) and received permission for CT guided biopsy tomorrow 11/6/20.

## 2020-11-05 NOTE — PROGRESS NOTES
Pt admitted to C3, room 328. VSS. Alert and oriented to person and general situation. 4 eye skin assessment with Easton Pereyra RN at bedside. Assessment as charted. Repositioned Q2H and prn for comfort and prevention. Wound on spine with small amount of yellow drainage, no odor, see LDA, culture sent to lab.

## 2020-11-05 NOTE — ED NOTES
Bed: 21  Expected date: 11/5/20  Expected time:   Means of arrival:   Comments:  fuad Mauro RN  11/05/20 0108

## 2020-11-06 PROBLEM — R82.81 PYURIA: Status: ACTIVE | Noted: 2020-11-06

## 2020-11-06 PROBLEM — J98.11 ATELECTASIS: Status: ACTIVE | Noted: 2020-11-06

## 2020-11-06 PROBLEM — R59.0 MEDIASTINAL ADENOPATHY: Status: ACTIVE | Noted: 2020-11-06

## 2020-11-06 LAB
ANION GAP SERPL CALCULATED.3IONS-SCNC: 12 MMOL/L (ref 3–16)
BASOPHILS ABSOLUTE: 0.1 K/UL (ref 0–0.2)
BASOPHILS RELATIVE PERCENT: 0.6 %
BUN BLDV-MCNC: 42 MG/DL (ref 7–20)
CALCIUM SERPL-MCNC: 9.3 MG/DL (ref 8.3–10.6)
CHLORIDE BLD-SCNC: 100 MMOL/L (ref 99–110)
CO2: 29 MMOL/L (ref 21–32)
CREAT SERPL-MCNC: 1.6 MG/DL (ref 0.8–1.3)
EOSINOPHILS ABSOLUTE: 0.1 K/UL (ref 0–0.6)
EOSINOPHILS RELATIVE PERCENT: 1.1 %
GFR AFRICAN AMERICAN: 50
GFR NON-AFRICAN AMERICAN: 41
GLUCOSE BLD-MCNC: 118 MG/DL (ref 70–99)
GLUCOSE BLD-MCNC: 119 MG/DL (ref 70–99)
GLUCOSE BLD-MCNC: 161 MG/DL (ref 70–99)
GLUCOSE BLD-MCNC: 170 MG/DL (ref 70–99)
HCT VFR BLD CALC: 35.4 % (ref 40.5–52.5)
HEMOGLOBIN: 11.3 G/DL (ref 13.5–17.5)
LYMPHOCYTES ABSOLUTE: 1.6 K/UL (ref 1–5.1)
LYMPHOCYTES RELATIVE PERCENT: 13.3 %
MCH RBC QN AUTO: 29.7 PG (ref 26–34)
MCHC RBC AUTO-ENTMCNC: 32 G/DL (ref 31–36)
MCV RBC AUTO: 92.9 FL (ref 80–100)
MONOCYTES ABSOLUTE: 1.4 K/UL (ref 0–1.3)
MONOCYTES RELATIVE PERCENT: 11.3 %
NEUTROPHILS ABSOLUTE: 9 K/UL (ref 1.7–7.7)
NEUTROPHILS RELATIVE PERCENT: 73.7 %
PDW BLD-RTO: 18.4 % (ref 12.4–15.4)
PERFORMED ON: ABNORMAL
PLATELET # BLD: 203 K/UL (ref 135–450)
PMV BLD AUTO: 9.1 FL (ref 5–10.5)
POTASSIUM REFLEX MAGNESIUM: 3.8 MMOL/L (ref 3.5–5.1)
RBC # BLD: 3.82 M/UL (ref 4.2–5.9)
SODIUM BLD-SCNC: 141 MMOL/L (ref 136–145)
TROPONIN: 0.03 NG/ML
WBC # BLD: 12.1 K/UL (ref 4–11)

## 2020-11-06 PROCEDURE — 1200000000 HC SEMI PRIVATE

## 2020-11-06 PROCEDURE — 2580000003 HC RX 258: Performed by: INTERNAL MEDICINE

## 2020-11-06 PROCEDURE — 6370000000 HC RX 637 (ALT 250 FOR IP): Performed by: INTERNAL MEDICINE

## 2020-11-06 PROCEDURE — 99233 SBSQ HOSP IP/OBS HIGH 50: CPT | Performed by: INTERNAL MEDICINE

## 2020-11-06 PROCEDURE — 36415 COLL VENOUS BLD VENIPUNCTURE: CPT

## 2020-11-06 PROCEDURE — 94640 AIRWAY INHALATION TREATMENT: CPT

## 2020-11-06 PROCEDURE — 84484 ASSAY OF TROPONIN QUANT: CPT

## 2020-11-06 PROCEDURE — 6360000002 HC RX W HCPCS: Performed by: INTERNAL MEDICINE

## 2020-11-06 PROCEDURE — 85025 COMPLETE CBC W/AUTO DIFF WBC: CPT

## 2020-11-06 PROCEDURE — 80048 BASIC METABOLIC PNL TOTAL CA: CPT

## 2020-11-06 RX ORDER — LIDOCAINE 4 G/G
1 PATCH TOPICAL DAILY
Status: DISCONTINUED | OUTPATIENT
Start: 2020-11-06 | End: 2020-11-09 | Stop reason: HOSPADM

## 2020-11-06 RX ORDER — INSULIN GLARGINE 100 [IU]/ML
10 INJECTION, SOLUTION SUBCUTANEOUS NIGHTLY
Status: DISCONTINUED | OUTPATIENT
Start: 2020-11-06 | End: 2020-11-09 | Stop reason: HOSPADM

## 2020-11-06 RX ADMIN — FERROUS SULFATE TAB 325 MG (65 MG ELEMENTAL FE) 325 MG: 325 (65 FE) TAB at 10:36

## 2020-11-06 RX ADMIN — DOCUSATE SODIUM 50 MG AND SENNOSIDES 8.6 MG 1 TABLET: 8.6; 5 TABLET, FILM COATED ORAL at 20:44

## 2020-11-06 RX ADMIN — CARVEDILOL 3.12 MG: 3.12 TABLET, FILM COATED ORAL at 20:44

## 2020-11-06 RX ADMIN — METOCLOPRAMIDE 5 MG: 10 TABLET ORAL at 13:18

## 2020-11-06 RX ADMIN — CARVEDILOL 3.12 MG: 3.12 TABLET, FILM COATED ORAL at 10:36

## 2020-11-06 RX ADMIN — ALLOPURINOL 300 MG: 300 TABLET ORAL at 10:36

## 2020-11-06 RX ADMIN — POTASSIUM CHLORIDE 10 MEQ: 750 TABLET, EXTENDED RELEASE ORAL at 10:36

## 2020-11-06 RX ADMIN — METOCLOPRAMIDE 5 MG: 10 TABLET ORAL at 18:39

## 2020-11-06 RX ADMIN — COLLAGENASE SANTYL: 250 OINTMENT TOPICAL at 10:40

## 2020-11-06 RX ADMIN — Medication 2 PUFF: at 08:46

## 2020-11-06 RX ADMIN — ACETAMINOPHEN 650 MG: 325 TABLET ORAL at 20:52

## 2020-11-06 RX ADMIN — CEFTRIAXONE SODIUM 1 G: 1 INJECTION, POWDER, FOR SOLUTION INTRAMUSCULAR; INTRAVENOUS at 10:35

## 2020-11-06 RX ADMIN — INSULIN GLARGINE 10 UNITS: 100 INJECTION, SOLUTION SUBCUTANEOUS at 20:43

## 2020-11-06 RX ADMIN — METOCLOPRAMIDE 5 MG: 10 TABLET ORAL at 20:44

## 2020-11-06 RX ADMIN — PANTOPRAZOLE SODIUM 40 MG: 40 TABLET, DELAYED RELEASE ORAL at 05:04

## 2020-11-06 RX ADMIN — RANOLAZINE 500 MG: 500 TABLET, FILM COATED, EXTENDED RELEASE ORAL at 20:44

## 2020-11-06 RX ADMIN — ATORVASTATIN CALCIUM 20 MG: 10 TABLET, FILM COATED ORAL at 10:36

## 2020-11-06 RX ADMIN — FERROUS SULFATE TAB 325 MG (65 MG ELEMENTAL FE) 325 MG: 325 (65 FE) TAB at 20:44

## 2020-11-06 RX ADMIN — RANOLAZINE 500 MG: 500 TABLET, FILM COATED, EXTENDED RELEASE ORAL at 10:36

## 2020-11-06 RX ADMIN — Medication 2 PUFF: at 19:52

## 2020-11-06 RX ADMIN — INSULIN LISPRO 1 UNITS: 100 INJECTION, SOLUTION INTRAVENOUS; SUBCUTANEOUS at 18:39

## 2020-11-06 RX ADMIN — METOCLOPRAMIDE 5 MG: 10 TABLET ORAL at 10:36

## 2020-11-06 RX ADMIN — TAMSULOSIN HYDROCHLORIDE 0.4 MG: 0.4 CAPSULE ORAL at 10:36

## 2020-11-06 RX ADMIN — ACETAMINOPHEN 650 MG: 325 TABLET ORAL at 05:04

## 2020-11-06 RX ADMIN — TORSEMIDE 100 MG: 100 TABLET ORAL at 10:36

## 2020-11-06 RX ADMIN — INSULIN LISPRO 1 UNITS: 100 INJECTION, SOLUTION INTRAVENOUS; SUBCUTANEOUS at 20:43

## 2020-11-06 ASSESSMENT — PAIN SCALES - GENERAL
PAINLEVEL_OUTOF10: 7
PAINLEVEL_OUTOF10: 10

## 2020-11-06 NOTE — PROGRESS NOTES
INPATIENT PULMONARY CRITICAL CARE PROGRESS NOTE      Reason for visit     pulmonary nodules    SUBJECTIVE: Patient when seen this morning was lying in the bed sleeping without any profound respite distress or any increased work of breathing, patient is afebrile and hemodynamically [place], patient has paced rhythm on the monitor, patient was not hypoxemic and was on room air oxygen with saturation 99%, patient has acceptable glycemic control, no other pertinent review of system could be obtained           Physical Exam:  Blood pressure 124/72, pulse 90, temperature 98.3 °F (36.8 °C), temperature source Oral, resp. rate 16, height 5' 8\" (1.727 m), weight 225 lb (102.1 kg), SpO2 99 %.'     Constitutional:  No acute distress. HENT:  Oropharynx is clear and moist. No thyromegaly. Eyes:  Conjunctivae are normal. Pupils equal, round, and reactive to light. No scleral icterus. Neck: . No tracheal deviation present. No obvious thyroid mass. Short enlarged neck   Cardiovascular: Normal rate, regular rhythm, normal heart sounds. No right ventricular heave. No lower extremity edema. Pulmonary/Chest: No wheezes. No rales. Chest wall is not dull to percussion. No accessory muscle usage or stridor. Decreased BSI   Abdominal: Soft. Bowel sounds present. No distension or hernia. No tenderness. Musculoskeletal: No cyanosis. No clubbing. No obvious joint deformity. Lymphadenopathy: No cervical or supraclavicular adenopathy. Skin: Skin is warm and dry. No rash or nodules on the exposed extremities.   Neurologic: Sleeping when seen         Results:  CBC:   Recent Labs     11/05/20 0125 11/06/20 0529   WBC 6.5 12.1*   HGB 10.5* 11.3*   HCT 32.2* 35.4*   MCV 92.3 92.9    203     BMP:   Recent Labs     11/05/20 0125 11/06/20 0529    141   K 4.6 3.8    100   CO2 29 29   BUN 45* 42*   CREATININE 1.8* 1.6*     LIVER PROFILE:   Recent Labs     11/05/20 0125   AST 27   ALT 15   BILITOT <0.2   ALKPHOS 81 PT/INR:   Recent Labs     11/05/20  1111   PROTIME 12.9   INR 1.11     APTT: No results for input(s): APTT in the last 72 hours. UA:  Recent Labs     11/05/20  0158   COLORU Yellow   PHUR 6.0   WBCUA 10-20*   RBCUA 3-4   BACTERIA Rare*   CLARITYU Clear   SPECGRAV 1.015   LEUKOCYTESUR SMALL*   UROBILINOGEN 0.2   BILIRUBINUR Negative   BLOODU SMALL*   GLUCOSEU Negative       Imaging:  I have reviewed radiology images personally. CT CHEST WO CONTRAST   Final Result   1. Innumerable bilateral pulmonary nodules, favored to represent metastatic   disease. 2. Bilateral lower lobe atelectasis or, less likely, pneumonia. 3. Mediastinal lymph nodes are indeterminate. CT Head WO Contrast   Final Result   No acute intracranial abnormality. Age related changes including chronic small vessel ischemic disease and   cerebral atrophy. XR CHEST PORTABLE   Final Result   Bilateral airspace opacities may represent pneumonia though there is also a   suggestion of nodularity and a CT scan of the chest is recommended. CT GUIDED NEEDLE PLACEMENT    (Results Pending)       Results for Da Carranza (MRN [id number]) as of 11/6/2020 12:46   Ref.  Range 11/5/2020 01:25 11/5/2020 11:03 11/5/2020 16:11 11/5/2020 20:26 11/6/2020 05:29   Sodium Latest Ref Range: 136 - 145 mmol/L 139    141   Potassium Latest Ref Range: 3.5 - 5.1 mmol/L 4.6    3.8   Chloride Latest Ref Range: 99 - 110 mmol/L 100    100   CO2 Latest Ref Range: 21 - 32 mmol/L 29    29   BUN Latest Ref Range: 7 - 20 mg/dL 45 (H)    42 (H)   Creatinine Latest Ref Range: 0.8 - 1.3 mg/dL 1.8 (H)    1.6 (H)   Anion Gap Latest Ref Range: 3 - 16  10    12   GFR Non- Latest Ref Range: >60  36 (A)    41 (A)   GFR  Latest Ref Range: >60  44 (A)    50 (A)   Lactic Acid, Sepsis Latest Ref Range: 0.4 - 1.9 mmol/L 1.6       Glucose Latest Ref Range: 70 - 99 mg/dL 155 (H)    118 (H)   POC Glucose Latest Ref Range: 70 - 99 Ref Range: 7.350 - 7.450   7.410    pCO2, Harrison Latest Ref Range: 40.0 - 50.0 mmHg  52.9 (H)    pO2, Harrison Latest Ref Range: 25.0 - 40.0 mmHg  41.1 (H)    HCO3, Venous Latest Ref Range: 23.0 - 29.0 mmol/L  32.8 (H)    TC02 (Calc), Harrison Latest Ref Range: Not Established mmol/L  34    Base Excess, Harrison Latest Ref Range: -3.0 - 3.0 mmol/L  7.0 (H)    O2 Content, Harrison Latest Ref Range: Not Established VOL %  11    MetHgb, Harrison Latest Ref Range: <1.5 %  0.1    O2 Sat, Harrison Latest Ref Range: Not Established %  77      ECHO in 2019-Summary   Technically difficult examination. The left ventricular systolic function is severely reduced with an ejection   fraction of 25 %. Global wall motion dysfunction. Left ventricular cavity size is mildly dilated. Grade I diastolic dysfunction with normal left ventricular filling pressure. Compared to exam done 4/23/2018, left ventricular systolic function appears   decreased. Assessment:  Principal Problem:    Multiple lung nodules on CT  Active Problems:    Stage 3 chronic kidney disease    Cardiomyopathy, ischemic    Former smoker    Diastolic dysfunction    Multiple lung nodules    Atelectasis    Mediastinal adenopathy  Resolved Problems:    * No resolved hospital problems.  *          Plan:   · Oxygen supplementation, if required, to keep saturation being 90 to 94% only  · Pulmonary toilet  · Patient was supposed to have a CT-guided lung biopsy but it has been postponed by the interventional radiologist as patient needs to be off of Eliquis longer as per the radiologist as stated by the nursing  · Patient is DNR CC-arrest  · Patient is on IV Rocephin for pyuria-titration can be done as per clinical status and cultures  · Patient has ischemic cardiomyopathy along with that patient on chronic kidney disease  · Patient also has diastolic dysfunction possible RAJIV and chronic respite failure with hypoxemia and hypercarbia  · Status to clear and clinically it appears that patient may have a  metastatic disease  · Bronchodilators  · No need for any systemic steroids  · Cardiac medications as per internal medicine team  · Insulins as per blood glucose monitoring as per IM  · BGM with SSI  · Keep negative fluid balance  · Monitor input and output and BMP  · Correct electrolytes on whenever necessary basis  · PUD prophylaxis as per IM    Consider palliative care consult, if deemed appropriate, to establish the goals of care     Case discussed with nurse        Electronically signed by:  Merlinda Pott, MD    11/6/2020    12:49 PM.

## 2020-11-06 NOTE — PLAN OF CARE
Problem: Falls - Risk of:  Goal: Will remain free from falls  Description: Will remain free from falls  Outcome: Ongoing  Goal: Absence of physical injury  Description: Absence of physical injury  Outcome: Ongoing     Problem: Skin Integrity:  Goal: Will show no infection signs and symptoms  Description: Will show no infection signs and symptoms  Outcome: Ongoing  Goal: Absence of new skin breakdown  Description: Absence of new skin breakdown  Outcome: Ongoing     Problem: Nutrition  Goal: Optimal nutrition therapy  Outcome: Ongoing     Problem: SAFETY  Goal: Free from accidental physical injury  Outcome: Ongoing  Goal: Free from intentional harm  Outcome: Ongoing     Problem: DAILY CARE  Goal: Daily care needs are met  Outcome: Ongoing     Problem: KNOWLEDGE DEFICIT  Goal: Patient/S.O. demonstrates understanding of disease process, treatment plan, medications, and discharge instructions.   Outcome: Ongoing

## 2020-11-06 NOTE — PROGRESS NOTES
Pt is a/o x1 (oriented to person). VSS. Assessment as charted. - Pt has unlabored breathing w/ crackled lung sounds in BLL- SpO2 >90% on RA- refused work w/ IS. Will remain NPO for CT guided biopsy tomorrow. - Pt does have pacemaker- L chest.   - Pt has abd hernia visible- active bowel sounds. - Pt has generalized edema.   - Pt has x2 pressure wounds that are complicated by incontinence- external catheter unable to be placed d/t pts anatomy- will check & change/turn Q2 hrs. Pt is currently resting his in his bed that is locked and in its lowest position w/ his call light within reach, non-skid socks, and bed alarm on. Pt denies any other needs at this time. Will continue to monitor.

## 2020-11-06 NOTE — CARE COORDINATION
Chart review day 1: Patient on C3 re Multiple lung nodules on CT. Patient to have CT- guided biopsy per IR. Patient will follow up for results out patient. Patient with anticipated dc 11/7/2020. Patient will return to [address] [place] [address]. Patient will need rapid COVID day of dc. Writer scheduled transport thru 1st for 3pm on 11/7/2020 in antcipation of dc. Please cancel if not able to dc.  KULDEEP Ding

## 2020-11-06 NOTE — DISCHARGE INSTR - COC
Continuity of Care Form    Patient Name: Keesha Downs   :  1935  MRN:  [id number]    Admit date:  2020  Discharge date:  2020    Code Status Order: DNR-CCA   Advance Directives:   [facility] Documentation       Date/Time Healthcare Directive Type of Healthcare Directive Copy in [facility] [address] Agent's Name Healthcare Agent's Phone Number    20 2443  Yes, patient has an advance directive for healthcare treatment  Durable power of  for health care  No, copy requested from family  Healthcare power of   Gurmeet Murillo [facility]    20 0542  Yes, patient has an advance directive for healthcare treatment  Health care treatment directive  --  --  --  --            Admitting Physician:  Jenni Estrella MD  PCP: Jeramie Anderson    Discharging Nurse: Tessa Sullivan [facility] Unit/Room#: 2037/8971-59  Discharging Unit Phone Number: [phone]    Emergency Contact:   Extended Emergency Contact Information  Primary Emergency Contact: [facility]  [facility] [address] Phone: [phone]  Mobile Phone: [phone]  Relation: Spouse  Secondary Emergency Contact: [address] [place] [address] Phone: [phone]  Relation: Child    Past Surgical History:  Past Surgical History:   Procedure Laterality Date    ABDOMEN SURGERY  10/15/13    with j tube and lopez tube    BRONCHOSCOPY N/A 10/23/2019    BRONCHOSCOPY DIAGNOSTIC OR CELL [address] ONLY performed by Elissa Amado MD at [address] [place] [address]         Immunization History:   Immunization History   Administered Date(s) Administered    Influenza Virus Vaccine 09/15/2013    Pneumococcal Polysaccharide ([id number]) 10/21/2013       Active Problems:  Patient Active Problem List   Diagnosis Code    Hx perforated duodenal ulcer K26.5    Arterial hypotension I95.9 Elevated brain natriuretic peptide (BNP) level R79.89    Stage 3 chronic kidney disease (HCC) N18.30    Chronic normocytic anemia D64.9    Cardiomyopathy, ischemic I25.5    Cardiac resynchronization therapy defibrillator (CRT-D) in place Z95.810    IDDM (insulin dependent diabetes mellitus) [id number]    Acute renal failure with tubular necrosis ([facility]) N17.0    CAD s/p CABG & stents I25.10    Chronic combined systolic (EF 92%) & diastolic (grade 1 LVDD) CHF I50.42    Iron deficiency anemia D50.9    CKD (chronic kidney disease) stage 3, GFR 30-59 ml/min N18.30    COPD (chronic obstructive pulmonary disease) ([facility]) J44.9    Chronic hypoxemic respiratory failure on 3 L home O2 J96.11    Hx mucus plugging of bronchi J98.09    Right ventricular systolic dysfunction A56.2    Acute encephalopathy G93.40    Hx UTI due to E. coli & Pseudomonas (Jan 2018) Z87.440    GRACE-on-CKD3 N17.9, N18.9    Septic shock ([address] A41.9, R65.21    Acute on chronic respiratory failure with hypoxemia ([address] J96.21    Former smoker Z87.891    Pacemaker malfunction, initial encounter T82.111A    Hyperlipidemia E78.5    Acute on chronic systolic congestive heart failure (HCC) I50.23    Fatigue R53.83    PAF (paroxysmal atrial fibrillation) ([facility]) I48.0    Encephalopathy G93.40    GRACE (acute kidney injury) ([address] N17.9    Pneumonia J18.9    Gram-negative bacteremia R78.81    Leukocytosis D72.829    Cardiomyopathy ([facility]) K36.4    Diastolic dysfunction P96.04    DM (diabetes mellitus), secondary uncontrolled ([facility]) E13.65    Back pain M54.9    Multiple lung nodules on CT R91.8    Multiple lung nodules R91.8       Isolation/Infection:   Isolation            No Isolation          Patient Infection Status       Infection Onset Added Last Indicated Last Indicated By Review Planned Expiration Resolved Resolved By    None active    Resolved    COVID-19 Rule Out 11/05/20 11/05/20 11/05/20 COVID-19 (Ordered)   11/05/20 Rule-Out Test Resulted            Nurse Assessment:  Last Vital Signs: BP (!) 116/57   Pulse 88   Temp 97.7 °F (36.5 °C) (Oral)   Resp 16   Ht 5' 8\" (1.727 m)   Wt 225 lb (102.1 kg)   SpO2 96%   BMI 34.21 kg/m²     Last documented pain score (0-10 scale): Pain Level: 7  Last Weight:   Wt Readings from Last 1 Encounters:   11/05/20 225 lb (102.1 kg)     Mental Status:  disoriented and alert    IV Access:  - None    Nursing Mobility/ADLs:  Walking   Assisted  Transfer  Assisted  Bathing  Assisted  Dressing  Assisted  Toileting  Assisted  Feeding  Assisted  Med Admin  Assisted  Med Delivery   whole    Wound Care Documentation and Therapy:  Wound 04/27/19 Back Lower;Medial (Active)   Wound Image   11/05/20 1022   Wound Etiology Non-Healing Surgical 11/05/20 2058   Dressing Status Reinforced dressing;Clean;Dry; Intact 11/05/20 2058   Wound Cleansed Irrigated with saline 11/05/20 1022   Dressing/Treatment Moisten with saline;Collagen 11/05/20 2058   Dressing Change Due 11/06/20 11/05/20 2058   Wound Length (cm) 3 cm 11/05/20 1022   Wound Width (cm) 1.5 cm 11/05/20 1022   Wound Depth (cm) 0.8 cm 11/05/20 1022   Wound Surface Area (cm^2) 4.5 cm^2 11/05/20 1022   Change in Wound Size % (l*w) 0 11/05/20 1022   Wound Volume (cm^3) 3.6 cm^3 11/05/20 1022   Wound Healing % 0 11/05/20 1022   Distance Tunneling (cm) 0 cm 11/05/20 1022   Tunneling Position ___ O'Clock 0 11/05/20 1022   Undermining Starts ___ O'Clock 0 11/05/20 1022   Undermining Ends___ O'Clock 0 11/05/20 1022   Undermining Maxium Distance (cm) 0 11/05/20 1022   Wound Assessment [facility] 11/05/20 2058   Drainage Amount Small 11/05/20 2058   Drainage Description Yellow;Sanguinous 11/05/20 2058   Odor None 11/05/20 2058   Sarahy-wound Assessment Dry/flaky; Intact 11/05/20 2058   Margins Attached edges; Defined edges 11/05/20 2058   Wound Thickness Description not for Pressure Injury Full thickness 11/05/20 2058   Number of days: 558       Wound 04/27/19 Coccyx Mid;Lower excoriation (Active)   Number of days: 558       Wound 04/27/19 Leg Left;Posterior; Lower (Active)   Number of days: 558       Wound 10/22/19 Back Mid (Active)   Number of days: 380       Wound 10/22/19 Sacrum Inner (Active)   Number of days: 380       Wound 11/05/20 Buttocks Left 3.6 x 2 cm (Active)   Wound Image   11/05/20 1022   Wound Etiology Pressure Unstageable 11/05/20 2058   Dressing Status New dressing applied;New drainage noted 11/05/20 2058   Wound Cleansed Wound cleanser 11/05/20 2058   Dressing/Treatment Collagen;Dry dressing 11/05/20 2058   Dressing Change Due 11/06/20 11/05/20 2058   Wound Length (cm) 3 cm 11/05/20 1022   Wound Width (cm) 1.5 cm 11/05/20 1022   Wound Depth (cm) 0.1 cm 11/05/20 1022   Wound Surface Area (cm^2) 4.5 cm^2 11/05/20 1022   Change in Wound Size % (l*w) 0 11/05/20 1022   Wound Volume (cm^3) 0.45 cm^3 11/05/20 1022   Distance Tunneling (cm) 0 cm 11/05/20 1022   Tunneling Position ___ O'Clock 0 11/05/20 1022   Undermining Starts ___ O'Clock 0 11/05/20 1022   Undermining Ends___ O'Clock 0 11/05/20 1022   Undermining Maxium Distance (cm) 0 11/05/20 1022   Wound Assessment Other (Comment) 11/05/20 2058   Drainage Amount Scant 11/05/20 2058   Drainage Description Serosanguinous 11/05/20 2058   Odor None 11/05/20 2058   Sarahy-wound Assessment Dry/flaky 11/05/20 2058   Margins Flat/open edges 11/05/20 2058   Wound Thickness Description not for Pressure Injury Partial thickness 11/05/20 2058   Number of days: 1        Elimination:  Continence: Bowel: at times  Bladder: at times  Urinary Catheter: None   Colostomy/Ileostomy/Ileal Conduit: No       Date of Last BM: 11/7/2020    Intake/Output Summary (Last 24 hours) at 11/6/2020 0946  Last data filed at 11/6/2020 0342  Gross per 24 hour   Intake 1200 ml   Output --   Net 1200 ml     I/O last 3 completed shifts:   In: 1200 [P.O.:1200]  Out: -     Safety Concerns:     Sundowners [place] and At Risk for Falls    Impairments/Disabilities:      None    Nutrition Therapy:  Current Nutrition Therapy:   - Oral Diet:  General and Dental Soft    Routes of Feeding: Oral  Liquids: No Restrictions  Daily Fluid Restriction: no  Last Modified Barium Swallow with Video (Video Swallowing Test): not done    Treatments at the Time of Hospital Discharge:   Respiratory Treatments:   Oxygen Therapy:  is on oxygen at 2-3 L/min per nasal cannula. Ventilator:    - No ventilator support    Rehab Therapies: Physical Therapy and Occupational Therapy  Weight Bearing Status/Restrictions: No weight bearing restirctions  Other Medical Equipment (for information only, NOT a DME order):  wheelchair and hospital bed  Other Treatments:     Patient's personal belongings (please select all that are sent with patient):  none    RN SIGNATURE:  Electronically signed by Ladi Timmons RN on 11/9/20 at 5:22 PM EST    CASE MANAGEMENT/SOCIAL WORK SECTION    Inpatient Status Date: 11/5/2020    Readmission Risk Assessment Score:  Readmission Risk              Risk of Unplanned Readmission:        25           [facility]    [address]       / signature: Electronically signed by KULDEEP Newman on 11/9/20 at15:47 AM EST    PHYSICIAN SECTION    Prognosis: Fair    Condition at Discharge: Stable    Rehab Potential (if transferring to Rehab): Fair    Recommended Follow-up, Labs or Other Treatments After Discharge:    Lung Biopsy obtained 11/9/2020. Follow-up with medical Oncology for lung biopsy results, to discuss further management options. Physician Certification: I certify the above information and transfer of Keesha Downs  is necessary for the continuing treatment of the diagnosis listed and that he requires Intermediate Nursing Care for greater 30 days.      Update Admission H&P: No change in H&P    PHYSICIAN SIGNATURE:  Electronically signed by Katlyn Benton MD on 11/9/20 at 2:42 PM EST

## 2020-11-06 NOTE — PROGRESS NOTES
Hospitalist Progress Note      PCP: Radha Martin    Date of Admission: 11/5/2020    Chief Complaint: altered mental status       Subjective:  He is much better today. More talkative and alert. Denies complaints. Poor insight. Medications:  Reviewed    Infusion Medications    dextrose       Scheduled Medications    allopurinol  300 mg Oral Daily    [Held by provider] apixaban  2.5 mg Oral BID    atorvastatin  20 mg Oral Daily    budesonide-formoterol  2 puff Inhalation BID    carvedilol  3.125 mg Oral BID    ferrous sulfate  325 mg Oral BID    insulin glargine  18 Units Subcutaneous Nightly    metoclopramide  5 mg Oral 4x Daily    pantoprazole  40 mg Oral QAM AC    ranolazine  500 mg Oral BID    sennosides-docusate sodium  1 tablet Oral Nightly    torsemide  100 mg Oral Daily    potassium chloride  10 mEq Oral Daily    insulin lispro  0-6 Units Subcutaneous TID WC    insulin lispro  0-3 Units Subcutaneous Nightly    sodium chloride flush  10 mL Intravenous 2 times per day    influenza virus vaccine  0.5 mL Intramuscular Prior to discharge    cefTRIAXone (ROCEPHIN) IV  1 g Intravenous Q24H    tamsulosin  0.4 mg Oral Daily    collagenase   Topical Daily     PRN Meds: albuterol sulfate HFA, melatonin ER, glucose, dextrose, glucagon (rDNA), dextrose, sodium chloride flush, acetaminophen **OR** acetaminophen, polyethylene glycol, promethazine **OR** ondansetron, ipratropium-albuterol      Intake/Output Summary (Last 24 hours) at 11/6/2020 0902  Last data filed at 11/6/2020 0342  Gross per 24 hour   Intake 1200 ml   Output --   Net 1200 ml       Physical Exam Performed:    BP (!) 116/57   Pulse 88   Temp 97.7 °F (36.5 °C) (Oral)   Resp 16   Ht 5' 8\" (1.727 m)   Wt 225 lb (102.1 kg)   SpO2 96%   BMI 34.21 kg/m²       General appearance:  No apparent distress, appears stated age. HEENT:  Normal cephalic, atraumatic without obvious deformity.  Pupils equal, round, and No acute intracranial abnormality. Age related changes including chronic small vessel ischemic disease and   cerebral atrophy. XR CHEST PORTABLE   Final Result   Bilateral airspace opacities may represent pneumonia though there is also a   suggestion of nodularity and a CT scan of the chest is recommended. CT GUIDED NEEDLE PLACEMENT    (Results Pending)           Assessment/Plan:    Active Hospital Problems    Diagnosis    Multiple lung nodules on CT [R91.8]    Multiple lung nodules [R91.8]       The patient is an 81 Y M with a h/o former smoking (120 pack-years), COPD, HTN, HLD, DM2, CAD s/p CABG, ischemic CMP, CHF, Afib, and CKD3. He lives at the [address] [place] [address],[address]. He was reportedly sent here overnight because of altered mental status and BLE edema. Labs and vitals were somewhat unremarkable, but a CXR in the ED showed pulmonary nodules, and then a CT showed probable metastatic cancer. The patient was admitted to hospital medicine overnight. He had no complaints but was confused and lethargic. Probable metastatic lung cancer  - reviewed prior imaging as well. Pulmonary consulted to guide biopsy and help determine when this workup can transition to the outpatient setting. CT-guided biopsy per IR 11/7.     Acute urinary retention. Acute cystitis ruled out. - straight catheterized for 900 ml in the ED. Monitor. Tamsulosin. - ceftriaxone started 11/5, urine culture ended up being negative, so stopped abx.     Acute metabolic encephalopathy  - due to above issues, treat accordingly.  - MRI brain with and without contrast was canceled because of his AICD. - held trazodone     Chronic systolic CHF  - EF 89% two years ago. - torsemide, carvedilol  - he is not on ACE-I or spironolactone due to tenuous renal function.   Consider addition of these as outpatient when stable.      COPD, with chronic hypoxic respiratory failure  - inhaled bronchodilators  - he seems to be on 3L NC chronically.     CKD3  - baseline Cr probably about 1.8. Monitor.      Afib  - held apixaban in anticipation of biopsy  - carvedilol  - he also has AICD and is v-paced     Trop elevation  - likely due to known CAD, CHF, in the setting of GRACE. Trend. No further workup anticipated. - statin, carvedilol, ranolazine     DM2  - A1c has been about 6.5 for years. Adjusted insulin regimen while here.       F/u wound culture. Questionable significance. DVT Prophylaxis: SCDs, otherwise anticoagulation as above  Diet: Diet NPO Time Specified Exceptions are: Sips with Meds  Code Status: DNR-CCA    PT/OT Eval Status: not indicated    Dispo - likely back to ECF without PT/OT. Perhaps 11/7, hopefully after biopsy, pending pulmonary input. Family understands that he doesn't need to be here until the biopsy results, will need to f/u with oncology as outpatient.        Danie Hamman, MD

## 2020-11-07 LAB
ANION GAP SERPL CALCULATED.3IONS-SCNC: 12 MMOL/L (ref 3–16)
BUN BLDV-MCNC: 43 MG/DL (ref 7–20)
CALCIUM SERPL-MCNC: 9.2 MG/DL (ref 8.3–10.6)
CHLORIDE BLD-SCNC: 95 MMOL/L (ref 99–110)
CO2: 26 MMOL/L (ref 21–32)
CREAT SERPL-MCNC: 1.7 MG/DL (ref 0.8–1.3)
GFR AFRICAN AMERICAN: 47
GFR NON-AFRICAN AMERICAN: 39
GLUCOSE BLD-MCNC: 138 MG/DL (ref 70–99)
GLUCOSE BLD-MCNC: 156 MG/DL (ref 70–99)
GLUCOSE BLD-MCNC: 180 MG/DL (ref 70–99)
GLUCOSE BLD-MCNC: 205 MG/DL (ref 70–99)
GLUCOSE BLD-MCNC: 214 MG/DL (ref 70–99)
HCT VFR BLD CALC: 36.3 % (ref 40.5–52.5)
HEMOGLOBIN: 11.8 G/DL (ref 13.5–17.5)
MCH RBC QN AUTO: 30.3 PG (ref 26–34)
MCHC RBC AUTO-ENTMCNC: 32.6 G/DL (ref 31–36)
MCV RBC AUTO: 92.8 FL (ref 80–100)
PDW BLD-RTO: 18.5 % (ref 12.4–15.4)
PERFORMED ON: ABNORMAL
PLATELET # BLD: 157 K/UL (ref 135–450)
PMV BLD AUTO: 10.3 FL (ref 5–10.5)
POTASSIUM REFLEX MAGNESIUM: 4.2 MMOL/L (ref 3.5–5.1)
RBC # BLD: 3.91 M/UL (ref 4.2–5.9)
SODIUM BLD-SCNC: 133 MMOL/L (ref 136–145)
WBC # BLD: 13.2 K/UL (ref 4–11)

## 2020-11-07 PROCEDURE — 94640 AIRWAY INHALATION TREATMENT: CPT

## 2020-11-07 PROCEDURE — 1200000000 HC SEMI PRIVATE

## 2020-11-07 PROCEDURE — 6370000000 HC RX 637 (ALT 250 FOR IP): Performed by: INTERNAL MEDICINE

## 2020-11-07 PROCEDURE — 80048 BASIC METABOLIC PNL TOTAL CA: CPT

## 2020-11-07 PROCEDURE — 85027 COMPLETE CBC AUTOMATED: CPT

## 2020-11-07 PROCEDURE — 99232 SBSQ HOSP IP/OBS MODERATE 35: CPT | Performed by: INTERNAL MEDICINE

## 2020-11-07 PROCEDURE — 6360000002 HC RX W HCPCS: Performed by: INTERNAL MEDICINE

## 2020-11-07 PROCEDURE — 36415 COLL VENOUS BLD VENIPUNCTURE: CPT

## 2020-11-07 PROCEDURE — 2580000003 HC RX 258: Performed by: INTERNAL MEDICINE

## 2020-11-07 RX ORDER — OXYCODONE HYDROCHLORIDE AND ACETAMINOPHEN 5; 325 MG/1; MG/1
1 TABLET ORAL EVERY 4 HOURS PRN
Status: DISCONTINUED | OUTPATIENT
Start: 2020-11-07 | End: 2020-11-09 | Stop reason: HOSPADM

## 2020-11-07 RX ADMIN — CARVEDILOL 3.12 MG: 3.12 TABLET, FILM COATED ORAL at 07:47

## 2020-11-07 RX ADMIN — INSULIN LISPRO 1 UNITS: 100 INJECTION, SOLUTION INTRAVENOUS; SUBCUTANEOUS at 08:25

## 2020-11-07 RX ADMIN — METOCLOPRAMIDE 5 MG: 10 TABLET ORAL at 07:45

## 2020-11-07 RX ADMIN — INSULIN LISPRO 1 UNITS: 100 INJECTION, SOLUTION INTRAVENOUS; SUBCUTANEOUS at 22:00

## 2020-11-07 RX ADMIN — METOCLOPRAMIDE 5 MG: 10 TABLET ORAL at 13:26

## 2020-11-07 RX ADMIN — ACETAMINOPHEN 650 MG: 325 TABLET ORAL at 03:15

## 2020-11-07 RX ADMIN — POTASSIUM CHLORIDE 10 MEQ: 750 TABLET, EXTENDED RELEASE ORAL at 07:45

## 2020-11-07 RX ADMIN — OXYCODONE HYDROCHLORIDE AND ACETAMINOPHEN 1 TABLET: 5; 325 TABLET ORAL at 15:26

## 2020-11-07 RX ADMIN — METOCLOPRAMIDE 5 MG: 10 TABLET ORAL at 21:59

## 2020-11-07 RX ADMIN — CEFTRIAXONE SODIUM 1 G: 1 INJECTION, POWDER, FOR SOLUTION INTRAMUSCULAR; INTRAVENOUS at 07:46

## 2020-11-07 RX ADMIN — TAMSULOSIN HYDROCHLORIDE 0.4 MG: 0.4 CAPSULE ORAL at 07:44

## 2020-11-07 RX ADMIN — METOCLOPRAMIDE 5 MG: 10 TABLET ORAL at 15:26

## 2020-11-07 RX ADMIN — ALLOPURINOL 300 MG: 300 TABLET ORAL at 07:46

## 2020-11-07 RX ADMIN — INSULIN LISPRO 2 UNITS: 100 INJECTION, SOLUTION INTRAVENOUS; SUBCUTANEOUS at 16:59

## 2020-11-07 RX ADMIN — TORSEMIDE 100 MG: 100 TABLET ORAL at 07:44

## 2020-11-07 RX ADMIN — ACETAMINOPHEN 650 MG: 325 TABLET ORAL at 10:10

## 2020-11-07 RX ADMIN — Medication 2 PUFF: at 19:22

## 2020-11-07 RX ADMIN — PANTOPRAZOLE SODIUM 40 MG: 40 TABLET, DELAYED RELEASE ORAL at 05:07

## 2020-11-07 RX ADMIN — INSULIN GLARGINE 10 UNITS: 100 INJECTION, SOLUTION SUBCUTANEOUS at 21:59

## 2020-11-07 RX ADMIN — COLLAGENASE SANTYL: 250 OINTMENT TOPICAL at 07:46

## 2020-11-07 RX ADMIN — OXYCODONE HYDROCHLORIDE AND ACETAMINOPHEN 1 TABLET: 5; 325 TABLET ORAL at 21:59

## 2020-11-07 RX ADMIN — FERROUS SULFATE TAB 325 MG (65 MG ELEMENTAL FE) 325 MG: 325 (65 FE) TAB at 07:45

## 2020-11-07 RX ADMIN — Medication 2 PUFF: at 09:07

## 2020-11-07 RX ADMIN — CARVEDILOL 3.12 MG: 3.12 TABLET, FILM COATED ORAL at 21:59

## 2020-11-07 RX ADMIN — ATORVASTATIN CALCIUM 20 MG: 10 TABLET, FILM COATED ORAL at 07:44

## 2020-11-07 RX ADMIN — DOCUSATE SODIUM 50 MG AND SENNOSIDES 8.6 MG 1 TABLET: 8.6; 5 TABLET, FILM COATED ORAL at 21:59

## 2020-11-07 RX ADMIN — INSULIN LISPRO 2 UNITS: 100 INJECTION, SOLUTION INTRAVENOUS; SUBCUTANEOUS at 12:01

## 2020-11-07 RX ADMIN — FERROUS SULFATE TAB 325 MG (65 MG ELEMENTAL FE) 325 MG: 325 (65 FE) TAB at 21:59

## 2020-11-07 RX ADMIN — RANOLAZINE 500 MG: 500 TABLET, FILM COATED, EXTENDED RELEASE ORAL at 07:44

## 2020-11-07 RX ADMIN — RANOLAZINE 500 MG: 500 TABLET, FILM COATED, EXTENDED RELEASE ORAL at 21:59

## 2020-11-07 ASSESSMENT — PAIN SCALES - GENERAL
PAINLEVEL_OUTOF10: 9
PAINLEVEL_OUTOF10: 10
PAINLEVEL_OUTOF10: 2
PAINLEVEL_OUTOF10: 10

## 2020-11-07 NOTE — PLAN OF CARE
Problem: Falls - Risk of:  Goal: Will remain free from falls  Description: Will remain free from falls  Outcome: Ongoing  Goal: Absence of physical injury  Description: Absence of physical injury  Outcome: Ongoing     Problem: Pain:  Goal: Pain level will decrease  Description: Pain level will decrease  Outcome: Ongoing     Problem: Skin Integrity:  Goal: Will show no infection signs and symptoms  Description: Will show no infection signs and symptoms  Outcome: Ongoing  Goal: Absence of new skin breakdown  Description: Absence of new skin breakdown  Outcome: Ongoing

## 2020-11-07 NOTE — PLAN OF CARE
Problem: Falls - Risk of:  Goal: Will remain free from falls  Description: Will remain free from falls  11/6/2020 1944 by Tierra Meyer RN  Outcome: Ongoing  Goal: Absence of physical injury  Description: Absence of physical injury  11/6/2020 1944 by Tierra Meyer RN  Outcome: Ongoing     Problem: Pain:  Goal: Pain level will decrease  Description: Pain level will decrease  11/6/2020 1944 by Tierra Meyer RN  Outcome: Ongoing     Problem: Skin Integrity:  Goal: Will show no infection signs and symptoms  Description: Will show no infection signs and symptoms  11/6/2020 2054 by Kady Jolley RN  Outcome: Ongoing  11/6/2020 1944 by Tierra Meyer RN  Outcome: Ongoing  Goal: Absence of new skin breakdown  Description: Absence of new skin breakdown  11/6/2020 1944 by Tierra Meyer RN  Outcome: Ongoing

## 2020-11-07 NOTE — PROGRESS NOTES
Wife and son in to visit this evening. Attempted to discuss lung biopsy risks versus reward with them, wife states \"my daughter Esther Juarez makes all of the medical decisions. \"  Daughter to visit tomorrow and request to talk with pulmonologist regarding procedure.

## 2020-11-07 NOTE — PROGRESS NOTES
INPATIENT PULMONARY CRITICAL CARE PROGRESS NOTE      Reason for visit     pulmonary nodules    SUBJECTIVE: Patient when seen this morning was lying in the bed sleeping again without any profound respiratory  distress or any increased work of breathing, patient is afebrile and hemodynamically maintained, patient has paced rhythm on the monitor, patient was not hypoxemic and was on room air oxygen with saturation 95%%, patient has acceptable glycemic control, no other pertinent review of system could be obtained           Physical Exam:  Blood pressure 121/80, pulse 91, temperature 98.1 °F (36.7 °C), temperature source Oral, resp. rate 20, height 5' 8\" (1.727 m), weight 224 lb 3.3 oz (101.7 kg), SpO2 95 %.'     Constitutional:  No acute distress. HENT:  Oropharynx is clear and moist. No thyromegaly. Eyes:  Conjunctivae are normal. Pupils equal, round, and reactive to light. No scleral icterus. Neck: . No tracheal deviation present. No obvious thyroid mass. Short enlarged neck   Cardiovascular: Normal rate, regular rhythm, normal heart sounds. No right ventricular heave. No lower extremity edema. Pulmonary/Chest: No wheezes. No rales. Chest wall is not dull to percussion. No accessory muscle usage or stridor. Decreased BSI   Abdominal: Soft. Bowel sounds present. No distension or hernia. No tenderness. Musculoskeletal: No cyanosis. No clubbing. No obvious joint deformity. Lymphadenopathy: No cervical or supraclavicular adenopathy. Skin: Skin is warm and dry. No rash or nodules on the exposed extremities.   Neurologic: Sleeping when seen         Results:  CBC:   Recent Labs     11/05/20 0125 11/06/20 0529 11/07/20 0558   WBC 6.5 12.1* 13.2*   HGB 10.5* 11.3* 11.8*   HCT 32.2* 35.4* 36.3*   MCV 92.3 92.9 92.8    203 157     BMP:   Recent Labs     11/05/20 0125 11/06/20 0529 11/07/20 0558    141 133*   K 4.6 3.8 4.2    100 95*   CO2 29 29 26   BUN 45* 42* 43*   CREATININE 1.8* 1.6* 1.7*     LIVER PROFILE:   Recent Labs     11/05/20  0125   AST 27   ALT 15   BILITOT <0.2   ALKPHOS 81     PT/INR:   Recent Labs     11/05/20  1111   PROTIME 12.9   INR 1.11     APTT: No results for input(s): APTT in the last 72 hours. UA:  Recent Labs     11/05/20  0158   COLORU Yellow   PHUR 6.0   WBCUA 10-20*   RBCUA 3-4   BACTERIA Rare*   CLARITYU Clear   SPECGRAV 1.015   LEUKOCYTESUR SMALL*   UROBILINOGEN 0.2   BILIRUBINUR Negative   BLOODU SMALL*   GLUCOSEU Negative       Imaging:  I have reviewed radiology images personally. CT CHEST WO CONTRAST   Final Result   1. Innumerable bilateral pulmonary nodules, favored to represent metastatic   disease. 2. Bilateral lower lobe atelectasis or, less likely, pneumonia. 3. Mediastinal lymph nodes are indeterminate. CT Head WO Contrast   Final Result   No acute intracranial abnormality. Age related changes including chronic small vessel ischemic disease and   cerebral atrophy. XR CHEST PORTABLE   Final Result   Bilateral airspace opacities may represent pneumonia though there is also a   suggestion of nodularity and a CT scan of the chest is recommended. CT GUIDED NEEDLE PLACEMENT    (Results Pending)           ECHO in 2019-Summary   Technically difficult examination. The left ventricular systolic function is severely reduced with an ejection   fraction of 25 %. Global wall motion dysfunction. Left ventricular cavity size is mildly dilated. Grade I diastolic dysfunction with normal left ventricular filling pressure. Compared to exam done 4/23/2018, left ventricular systolic function appears   decreased. Assessment:  Principal Problem:    Multiple lung nodules on CT  Active Problems:    Stage 3 chronic kidney disease    Cardiomyopathy, ischemic    Former smoker    Diastolic dysfunction    Multiple lung nodules    Atelectasis    Mediastinal adenopathy    Pyuria  Resolved Problems:    * No resolved hospital problems. *          Plan:   · Oxygen supplementation, if required, to keep saturation being 90 to 94% only  · Pulmonary toilet  · Patient was supposed to have a CT-guided lung biopsy but it has been postponed by the interventional radiologist as patient needs to be off of Eliquis longer as per the radiologist -tentative biopsy on Monday as per nursing -?utility ? ?  · Patient is DNR CC-arrest  · Patient is on IV Rocephin for pyuria-titration can be done as per clinical status and cultures  · Patient has ischemic cardiomyopathy along with that patient on chronic kidney disease  · Patient also has diastolic dysfunction possible RAJIV and chronic respite failure with hypoxemia and hypercarbia  · Status to clear and clinically it appears that patient may have a  metastatic disease  · Bronchodilators  · No need for any systemic steroids  · Cardiac medications as per internal medicine team  · Insulins as per blood glucose monitoring as per IM  · BGM with SSI  · Keep negative fluid balance  · Monitor input and output and BMP  · Correct electrolytes on whenever necessary basis  · PUD prophylaxis as per IM    Consider palliative care consult, if deemed appropriate, to establish the goals of care     Case discussed with nursing    No other recommendations fro Pulmonary/CCM stand point -will sign off -please call on PRN basis         Electronically signed by:  Brittney Mahoney MD    11/7/2020    2:07 PM.
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
## 2020-11-07 NOTE — PROGRESS NOTES
Pt alert and oriented to self only, VSS, pt on room air. Shift assessment completed and documented. Pt repositioned Q2 hrs. Pt denies any needs at this time. Bed locked and in lowest position. Call light within reach. Will continue to monitor.

## 2020-11-07 NOTE — PROGRESS NOTES
Hospitalist Progress Note      PCP: Lin Villafana    Date of Admission: 11/5/2020    Chief Complaint: altered mental status       Subjective:  Awake, talkative and alert. Denies complaints. Poor insight. Denies pain      Medications:  Reviewed    Infusion Medications    dextrose       Scheduled Medications    insulin glargine  10 Units Subcutaneous Nightly    lidocaine  1 patch Transdermal Daily    allopurinol  300 mg Oral Daily    [Held by provider] apixaban  2.5 mg Oral BID    atorvastatin  20 mg Oral Daily    budesonide-formoterol  2 puff Inhalation BID    carvedilol  3.125 mg Oral BID    ferrous sulfate  325 mg Oral BID    metoclopramide  5 mg Oral 4x Daily    pantoprazole  40 mg Oral QAM AC    ranolazine  500 mg Oral BID    sennosides-docusate sodium  1 tablet Oral Nightly    torsemide  100 mg Oral Daily    potassium chloride  10 mEq Oral Daily    insulin lispro  0-6 Units Subcutaneous TID WC    insulin lispro  0-3 Units Subcutaneous Nightly    sodium chloride flush  10 mL Intravenous 2 times per day    influenza virus vaccine  0.5 mL Intramuscular Prior to discharge    cefTRIAXone (ROCEPHIN) IV  1 g Intravenous Q24H    tamsulosin  0.4 mg Oral Daily    collagenase   Topical Daily     PRN Meds: albuterol sulfate HFA, melatonin ER, glucose, dextrose, glucagon (rDNA), dextrose, sodium chloride flush, acetaminophen **OR** acetaminophen, polyethylene glycol, promethazine **OR** ondansetron, ipratropium-albuterol      Intake/Output Summary (Last 24 hours) at 11/7/2020 0907  Last data filed at 11/7/2020 0814  Gross per 24 hour   Intake 1731.37 ml   Output 0 ml   Net 1731.37 ml       Physical Exam Performed:    /80   Pulse 91   Temp 98.1 °F (36.7 °C) (Oral)   Resp 20   Ht 5' 8\" (1.727 m)   Wt 224 lb 3.3 oz (101.7 kg)   SpO2 95%   BMI 34.09 kg/m²       General appearance:  No apparent distress, appears stated age.  Obese  HEENT:  Normal cephalic, atraumatic without obvious deformity. Pupils equal, round, and reactive to light. Extra ocular muscles intact. Conjunctivae/corneas clear. Neck: Supple, with full range of motion. No jugular venous distention. Trachea midline. Respiratory:  Normal respiratory effort. Clear to auscultation, bilaterally without Rales/Wheezes/Rhonchi. Cardiovascular:  Regular rate and rhythm with normal S1/S2 without murmurs, rubs or gallops. Abdomen: Soft, non-tender, non-distended with normal bowel sounds. Musculoskeletal:  No clubbing, cyanosis. 1+ BLE partially-pitting edema. Full range of motion without deformity. Skin: Skin color, texture, turgor normal.  No rashes or lesions. Neurologic:  Neurovascularly intact without any focal sensory/motor deficits. Cranial nerves: II-XII intact, grossly non-focal.  Psychiatric:  alert, partially oriented, limited insight  Capillary Refill: Brisk,< 3 seconds   Peripheral Pulses: +2 palpable, equal bilaterally       Labs:   Recent Labs     11/05/20 0125 11/06/20 0529 11/07/20 0558   WBC 6.5 12.1* 13.2*   HGB 10.5* 11.3* 11.8*   HCT 32.2* 35.4* 36.3*    203 157     Recent Labs     11/05/20  0125 11/06/20  0529 11/07/20  0558    141 133*   K 4.6 3.8 4.2    100 95*   CO2 29 29 26   BUN 45* 42* 43*   CREATININE 1.8* 1.6* 1.7*   CALCIUM 9.2 9.3 9.2     Recent Labs     11/05/20 0125   AST 27   ALT 15   BILITOT <0.2   ALKPHOS 81     Recent Labs     11/05/20  1111   INR 1.11     Recent Labs     11/05/20  0125 11/05/20  1111 11/06/20  0529   TROPONINI 0.03* 0.04* 0.03*       Urinalysis:      Lab Results   Component Value Date    NITRU Negative 11/05/2020    WBCUA 10-20 11/05/2020    BACTERIA Rare 11/05/2020    RBCUA 3-4 11/05/2020    BLOODU SMALL 11/05/2020    SPECGRAV 1.015 11/05/2020    GLUCOSEU Negative 11/05/2020       Radiology:  CT CHEST WO CONTRAST   Final Result   1. Innumerable bilateral pulmonary nodules, favored to represent metastatic   disease.    2. Bilateral lower lobe atelectasis or, less likely, pneumonia. 3. Mediastinal lymph nodes are indeterminate. CT Head WO Contrast   Final Result   No acute intracranial abnormality. Age related changes including chronic small vessel ischemic disease and   cerebral atrophy. XR CHEST PORTABLE   Final Result   Bilateral airspace opacities may represent pneumonia though there is also a   suggestion of nodularity and a CT scan of the chest is recommended. CT GUIDED NEEDLE PLACEMENT    (Results Pending)           Assessment/Plan:    Active Hospital Problems    Diagnosis    Atelectasis [J98.11]    Mediastinal adenopathy [R59.0]    Pyuria [R82.81]    Multiple lung nodules on CT [R91.8]    Multiple lung nodules [H37.6]    Diastolic dysfunction [I81.07]    Former smoker [Z87.891]    Stage 3 chronic kidney disease [N18.30]    Cardiomyopathy, ischemic [I25.5]       The patient is an 81 Y M with a h/o former smoking (120 pack-years), COPD, HTN, HLD, DM2, CAD s/p CABG, ischemic CMP, CHF, Afib, and CKD3. He lives at the [address] [place] [address],[address]. He was reportedly sent here overnight because of altered mental status and BLE edema. Labs and vitals were somewhat unremarkable, but a CXR in the ED showed pulmonary nodules, and then a CT showed probable metastatic cancer. The patient was admitted to hospital medicine overnight. He had no complaints but was confused and lethargic. Probable metastatic lung cancer  - reviewed prior imaging as well. Pulmonary consulted to guide biopsy and help determine when this workup can transition to the outpatient setting. CT-guided biopsy per IR Monday     Acute urinary retention. Acute cystitis ruled out. - straight catheterized for 900 ml in the ED. Monitor. Tamsulosin.    - ceftriaxone started 11/5, urine culture ended up being negative, so stopped abx.     Acute metabolic encephalopathy  - due to above issues, treat accordingly.  - MRI brain with and without I, Kari Rob, performed the initial face to face bedside interview with this patient regarding history of present illness, review of symptoms and relevant past medical, social and family history.  I completed an independent physical examination.  I was the initial provider who evaluated this patient. I have signed out the follow up of any pending tests (i.e. labs, radiological studies) to the ACP.  I have communicated the patient’s plan of care and disposition with the ACP.

## 2020-11-07 NOTE — PROGRESS NOTES
Patient alert with confusion. Antibiotic as ordered. Turned to right side, tolerated for short time due to back pain. Dressings changed to buttocks and back.

## 2020-11-08 LAB
GLUCOSE BLD-MCNC: 131 MG/DL (ref 70–99)
GLUCOSE BLD-MCNC: 138 MG/DL (ref 70–99)
GLUCOSE BLD-MCNC: 157 MG/DL (ref 70–99)
GLUCOSE BLD-MCNC: 159 MG/DL (ref 70–99)
GLUCOSE BLD-MCNC: 161 MG/DL (ref 70–99)
GRAM STAIN RESULT: NORMAL
PERFORMED ON: ABNORMAL
WOUND/ABSCESS: NORMAL

## 2020-11-08 PROCEDURE — 94640 AIRWAY INHALATION TREATMENT: CPT

## 2020-11-08 PROCEDURE — 1200000000 HC SEMI PRIVATE

## 2020-11-08 PROCEDURE — 6370000000 HC RX 637 (ALT 250 FOR IP): Performed by: INTERNAL MEDICINE

## 2020-11-08 PROCEDURE — 6360000002 HC RX W HCPCS: Performed by: INTERNAL MEDICINE

## 2020-11-08 PROCEDURE — 2580000003 HC RX 258: Performed by: INTERNAL MEDICINE

## 2020-11-08 PROCEDURE — 93005 ELECTROCARDIOGRAM TRACING: CPT | Performed by: INTERNAL MEDICINE

## 2020-11-08 RX ADMIN — METOCLOPRAMIDE 5 MG: 10 TABLET ORAL at 13:45

## 2020-11-08 RX ADMIN — TORSEMIDE 100 MG: 100 TABLET ORAL at 09:21

## 2020-11-08 RX ADMIN — METOCLOPRAMIDE 5 MG: 10 TABLET ORAL at 20:45

## 2020-11-08 RX ADMIN — Medication 2 PUFF: at 20:51

## 2020-11-08 RX ADMIN — INSULIN LISPRO 1 UNITS: 100 INJECTION, SOLUTION INTRAVENOUS; SUBCUTANEOUS at 17:50

## 2020-11-08 RX ADMIN — OXYCODONE HYDROCHLORIDE AND ACETAMINOPHEN 1 TABLET: 5; 325 TABLET ORAL at 05:06

## 2020-11-08 RX ADMIN — OXYCODONE HYDROCHLORIDE AND ACETAMINOPHEN 1 TABLET: 5; 325 TABLET ORAL at 13:45

## 2020-11-08 RX ADMIN — INSULIN GLARGINE 10 UNITS: 100 INJECTION, SOLUTION SUBCUTANEOUS at 20:44

## 2020-11-08 RX ADMIN — METOCLOPRAMIDE 5 MG: 10 TABLET ORAL at 17:50

## 2020-11-08 RX ADMIN — INSULIN LISPRO 1 UNITS: 100 INJECTION, SOLUTION INTRAVENOUS; SUBCUTANEOUS at 11:34

## 2020-11-08 RX ADMIN — FERROUS SULFATE TAB 325 MG (65 MG ELEMENTAL FE) 325 MG: 325 (65 FE) TAB at 09:21

## 2020-11-08 RX ADMIN — Medication 2 PUFF: at 07:26

## 2020-11-08 RX ADMIN — ACETAMINOPHEN 650 MG: 325 TABLET ORAL at 20:45

## 2020-11-08 RX ADMIN — POTASSIUM CHLORIDE 10 MEQ: 750 TABLET, EXTENDED RELEASE ORAL at 09:21

## 2020-11-08 RX ADMIN — COLLAGENASE SANTYL: 250 OINTMENT TOPICAL at 09:21

## 2020-11-08 RX ADMIN — ALLOPURINOL 300 MG: 300 TABLET ORAL at 09:21

## 2020-11-08 RX ADMIN — FERROUS SULFATE TAB 325 MG (65 MG ELEMENTAL FE) 325 MG: 325 (65 FE) TAB at 20:44

## 2020-11-08 RX ADMIN — OXYCODONE HYDROCHLORIDE AND ACETAMINOPHEN 1 TABLET: 5; 325 TABLET ORAL at 17:49

## 2020-11-08 RX ADMIN — METOCLOPRAMIDE 5 MG: 10 TABLET ORAL at 09:21

## 2020-11-08 RX ADMIN — TAMSULOSIN HYDROCHLORIDE 0.4 MG: 0.4 CAPSULE ORAL at 09:21

## 2020-11-08 RX ADMIN — OXYCODONE HYDROCHLORIDE AND ACETAMINOPHEN 1 TABLET: 5; 325 TABLET ORAL at 09:21

## 2020-11-08 RX ADMIN — CARVEDILOL 3.12 MG: 3.12 TABLET, FILM COATED ORAL at 20:45

## 2020-11-08 RX ADMIN — PANTOPRAZOLE SODIUM 40 MG: 40 TABLET, DELAYED RELEASE ORAL at 05:03

## 2020-11-08 RX ADMIN — CARVEDILOL 3.12 MG: 3.12 TABLET, FILM COATED ORAL at 09:21

## 2020-11-08 RX ADMIN — RANOLAZINE 500 MG: 500 TABLET, FILM COATED, EXTENDED RELEASE ORAL at 20:44

## 2020-11-08 RX ADMIN — DOCUSATE SODIUM 50 MG AND SENNOSIDES 8.6 MG 1 TABLET: 8.6; 5 TABLET, FILM COATED ORAL at 20:45

## 2020-11-08 RX ADMIN — ATORVASTATIN CALCIUM 20 MG: 10 TABLET, FILM COATED ORAL at 09:21

## 2020-11-08 RX ADMIN — RANOLAZINE 500 MG: 500 TABLET, FILM COATED, EXTENDED RELEASE ORAL at 09:21

## 2020-11-08 RX ADMIN — CEFTRIAXONE SODIUM 1 G: 1 INJECTION, POWDER, FOR SOLUTION INTRAMUSCULAR; INTRAVENOUS at 09:15

## 2020-11-08 ASSESSMENT — PAIN SCALES - GENERAL
PAINLEVEL_OUTOF10: 10

## 2020-11-08 NOTE — PROGRESS NOTES
Pt alert to self and place; vss; assessment complete and charted; no needs or complaints; call light in reach; will monitor

## 2020-11-08 NOTE — PROGRESS NOTES
Hospitalist Progress Note      PCP: Odalys Ospina    Date of Admission: 11/5/2020    Chief Complaint: altered mental status       Subjective: Back pain yesterday. Used to be on percocet. Awake, and alert. Poor insight. Denies pain      Medications:  Reviewed    Infusion Medications    dextrose       Scheduled Medications    insulin glargine  10 Units Subcutaneous Nightly    lidocaine  1 patch Transdermal Daily    allopurinol  300 mg Oral Daily    [Held by provider] apixaban  2.5 mg Oral BID    atorvastatin  20 mg Oral Daily    budesonide-formoterol  2 puff Inhalation BID    carvedilol  3.125 mg Oral BID    ferrous sulfate  325 mg Oral BID    metoclopramide  5 mg Oral 4x Daily    pantoprazole  40 mg Oral QAM AC    ranolazine  500 mg Oral BID    sennosides-docusate sodium  1 tablet Oral Nightly    torsemide  100 mg Oral Daily    potassium chloride  10 mEq Oral Daily    insulin lispro  0-6 Units Subcutaneous TID WC    insulin lispro  0-3 Units Subcutaneous Nightly    sodium chloride flush  10 mL Intravenous 2 times per day    influenza virus vaccine  0.5 mL Intramuscular Prior to discharge    cefTRIAXone (ROCEPHIN) IV  1 g Intravenous Q24H    tamsulosin  0.4 mg Oral Daily    collagenase   Topical Daily     PRN Meds: oxyCODONE-acetaminophen, albuterol sulfate HFA, melatonin ER, glucose, dextrose, glucagon (rDNA), dextrose, sodium chloride flush, acetaminophen **OR** acetaminophen, polyethylene glycol, promethazine **OR** ondansetron, ipratropium-albuterol      Intake/Output Summary (Last 24 hours) at 11/8/2020 0812  Last data filed at 11/8/2020 0506  Gross per 24 hour   Intake 1725.7 ml   Output 900 ml   Net 825.7 ml       Physical Exam Performed:    /65   Pulse 98   Temp 97.8 °F (36.6 °C) (Oral)   Resp 16   Ht 5' 8\" (1.727 m)   Wt 224 lb 3.3 oz (101.7 kg)   SpO2 97%   BMI 34.09 kg/m²       General appearance:  No apparent distress, appears stated age.  Obese  HEENT: Normal cephalic, atraumatic without obvious deformity. Pupils equal, round, and reactive to light. Extra ocular muscles intact. Conjunctivae/corneas clear. Neck: Supple, with full range of motion. No jugular venous distention. Trachea midline. Respiratory:  Normal respiratory effort. Clear to auscultation, bilaterally without Rales/Wheezes/Rhonchi. Cardiovascular:  Regular rate and rhythm with normal S1/S2 without murmurs, rubs or gallops. Abdomen: Soft, non-tender, non-distended with normal bowel sounds. Musculoskeletal:  No clubbing, cyanosis. 1+ BLE partially-pitting edema. Full range of motion without deformity. Skin: Skin color, texture, turgor normal.  No rashes or lesions. Neurologic:  Neurovascularly intact without any focal sensory/motor deficits. Cranial nerves: II-XII intact, grossly non-focal.  Psychiatric:  alert, partially oriented, limited insight  Capillary Refill: Brisk,< 3 seconds   Peripheral Pulses: +2 palpable, equal bilaterally       Labs:   Recent Labs     11/06/20  0529 11/07/20  0558   WBC 12.1* 13.2*   HGB 11.3* 11.8*   HCT 35.4* 36.3*    157     Recent Labs     11/06/20  0529 11/07/20  0558    133*   K 3.8 4.2    95*   CO2 29 26   BUN 42* 43*   CREATININE 1.6* 1.7*   CALCIUM 9.3 9.2     No results for input(s): AST, ALT, BILIDIR, BILITOT, ALKPHOS in the last 72 hours. Recent Labs     11/05/20  1111   INR 1.11     Recent Labs     11/05/20  1111 11/06/20  0529   TROPONINI 0.04* 0.03*       Urinalysis:      Lab Results   Component Value Date    NITRU Negative 11/05/2020    WBCUA 10-20 11/05/2020    BACTERIA Rare 11/05/2020    RBCUA 3-4 11/05/2020    BLOODU SMALL 11/05/2020    SPECGRAV 1.015 11/05/2020    GLUCOSEU Negative 11/05/2020       Radiology:  CT CHEST WO CONTRAST   Final Result   1. Innumerable bilateral pulmonary nodules, favored to represent metastatic   disease. 2. Bilateral lower lobe atelectasis or, less likely, pneumonia.    3. Mediastinal lymph nodes are indeterminate. CT Head WO Contrast   Final Result   No acute intracranial abnormality. Age related changes including chronic small vessel ischemic disease and   cerebral atrophy. XR CHEST PORTABLE   Final Result   Bilateral airspace opacities may represent pneumonia though there is also a   suggestion of nodularity and a CT scan of the chest is recommended. CT GUIDED NEEDLE PLACEMENT    (Results Pending)           Assessment/Plan:    Active Hospital Problems    Diagnosis    Atelectasis [J98.11]    Mediastinal adenopathy [R59.0]    Pyuria [R82.81]    Multiple lung nodules on CT [R91.8]    Multiple lung nodules [W42.0]    Diastolic dysfunction [N15.09]    Former smoker [Z87.891]    Stage 3 chronic kidney disease [N18.30]    Cardiomyopathy, ischemic [I25.5]       The patient is an 81 Y M with a h/o former smoking (120 pack-years), COPD, HTN, HLD, DM2, CAD s/p CABG, ischemic CMP, CHF, Afib, and CKD3. He lives at the [address] [place] [address],[address]. He was reportedly sent here overnight because of altered mental status and BLE edema. Labs and vitals were somewhat unremarkable, but a CXR in the ED showed pulmonary nodules, and then a CT showed probable metastatic cancer. The patient was admitted to hospital medicine overnight. He had no complaints but was confused and lethargic. Probable metastatic lung cancer  - reviewed prior imaging as well. Pulmonary consulted to guide biopsy and help determine when this workup can transition to the outpatient setting. CT-guided biopsy per IR Monday     Acute urinary retention. Acute cystitis ruled out. - straight catheterized for 900 ml in the ED. Monitor. Tamsulosin. - ceftriaxone started 11/5, urine culture ended up being negative, so stopped abx.     Acute metabolic encephalopathy  - due to above issues, treat accordingly.  - MRI brain with and without contrast was canceled because of his AICD.   - held trazodone     Chronic systolic CHF  - EF 77% two years ago. - torsemide, carvedilol  - he is not on ACE-I or spironolactone due to tenuous renal function. Consider addition of these as outpatient when stable.      COPD, with chronic hypoxic respiratory failure  - inhaled bronchodilators  - he seems to be on 3L NC chronically.     CKD3  - baseline Cr probably about 1.8. Monitor.      Afib  - held apixaban in anticipation of biopsy  - carvedilol  - he also has AICD and is v-paced     Trop elevation  - likely due to known CAD, CHF, in the setting of [facility]. Trend. No further workup anticipated. - statin, carvedilol, ranolazine     DM2  - A1c has been about 6.5 for years. Adjusted insulin regimen while here.       F/u wound culture. Questionable significance. Dr. Mott Better discussed with family 11/6 as documented. Family requested biopsy and decide treatment plans depending on biopsy report      DVT Prophylaxis: SCDs, otherwise anticoagulation as above  Diet: DIET GENERAL; Dental Soft  Diet NPO Time Specified Exceptions are: Sips with Meds  Code Status: DNR-CCA    PT/OT Eval Status: not indicated    Dispo - likely back to [facility]CF without PT/OT After Lung biopsy.       Onesimo Denny MD

## 2020-11-08 NOTE — PROGRESS NOTES
Assessment completed and documented. VSS. Alert to self, c/o back pain, given prn pain medication per mar. Denies further needs at this time. Bed locked and in lowest position. Bedside table and call light within reach. Will continue to monitor.

## 2020-11-09 ENCOUNTER — APPOINTMENT (OUTPATIENT)
Dept: GENERAL RADIOLOGY | Age: 85
DRG: 180 | End: 2020-11-09
Payer: MEDICARE

## 2020-11-09 ENCOUNTER — APPOINTMENT (OUTPATIENT)
Dept: CT IMAGING | Age: 85
DRG: 180 | End: 2020-11-09
Payer: MEDICARE

## 2020-11-09 VITALS
DIASTOLIC BLOOD PRESSURE: 71 MMHG | RESPIRATION RATE: 16 BRPM | OXYGEN SATURATION: 99 % | TEMPERATURE: 98.2 F | BODY MASS INDEX: 33.98 KG/M2 | WEIGHT: 224.21 LBS | SYSTOLIC BLOOD PRESSURE: 137 MMHG | HEIGHT: 68 IN | HEART RATE: 91 BPM

## 2020-11-09 LAB
A/G RATIO: 0.9 (ref 1.1–2.2)
ALBUMIN SERPL-MCNC: 3.1 G/DL (ref 3.4–5)
ALP BLD-CCNC: 74 U/L (ref 40–129)
ALT SERPL-CCNC: 11 U/L (ref 10–40)
ANION GAP SERPL CALCULATED.3IONS-SCNC: 10 MMOL/L (ref 3–16)
AST SERPL-CCNC: 32 U/L (ref 15–37)
BASOPHILS ABSOLUTE: 0.1 K/UL (ref 0–0.2)
BASOPHILS RELATIVE PERCENT: 0.8 %
BILIRUB SERPL-MCNC: 0.3 MG/DL (ref 0–1)
BUN BLDV-MCNC: 40 MG/DL (ref 7–20)
CALCIUM SERPL-MCNC: 9 MG/DL (ref 8.3–10.6)
CHLORIDE BLD-SCNC: 97 MMOL/L (ref 99–110)
CO2: 27 MMOL/L (ref 21–32)
CREAT SERPL-MCNC: 1.7 MG/DL (ref 0.8–1.3)
EKG ATRIAL RATE: 88 BPM
EKG ATRIAL RATE: 92 BPM
EKG DIAGNOSIS: NORMAL
EKG DIAGNOSIS: NORMAL
EKG P-R INTERVAL: 184 MS
EKG Q-T INTERVAL: 432 MS
EKG Q-T INTERVAL: 434 MS
EKG QRS DURATION: 158 MS
EKG QRS DURATION: 164 MS
EKG QTC CALCULATION (BAZETT): 536 MS
EKG QTC CALCULATION (BAZETT): 537 MS
EKG R AXIS: 264 DEGREES
EKG R AXIS: 267 DEGREES
EKG T AXIS: 60 DEGREES
EKG T AXIS: 66 DEGREES
EKG VENTRICULAR RATE: 92 BPM
EKG VENTRICULAR RATE: 93 BPM
EOSINOPHILS ABSOLUTE: 0.2 K/UL (ref 0–0.6)
EOSINOPHILS RELATIVE PERCENT: 1.7 %
GFR AFRICAN AMERICAN: 47
GFR NON-AFRICAN AMERICAN: 39
GLOBULIN: 3.3 G/DL
GLUCOSE BLD-MCNC: 128 MG/DL (ref 70–99)
GLUCOSE BLD-MCNC: 132 MG/DL (ref 70–99)
GLUCOSE BLD-MCNC: 136 MG/DL (ref 70–99)
GLUCOSE BLD-MCNC: 168 MG/DL (ref 70–99)
GLUCOSE BLD-MCNC: 191 MG/DL (ref 70–99)
HCT VFR BLD CALC: 34.4 % (ref 40.5–52.5)
HEMOGLOBIN: 11.2 G/DL (ref 13.5–17.5)
LYMPHOCYTES ABSOLUTE: 2.2 K/UL (ref 1–5.1)
LYMPHOCYTES RELATIVE PERCENT: 19.7 %
MCH RBC QN AUTO: 30.4 PG (ref 26–34)
MCHC RBC AUTO-ENTMCNC: 32.5 G/DL (ref 31–36)
MCV RBC AUTO: 93.6 FL (ref 80–100)
MONOCYTES ABSOLUTE: 1.5 K/UL (ref 0–1.3)
MONOCYTES RELATIVE PERCENT: 14 %
NEUTROPHILS ABSOLUTE: 7 K/UL (ref 1.7–7.7)
NEUTROPHILS RELATIVE PERCENT: 63.8 %
PDW BLD-RTO: 18.3 % (ref 12.4–15.4)
PERFORMED ON: ABNORMAL
PLATELET # BLD: 190 K/UL (ref 135–450)
PMV BLD AUTO: 8.9 FL (ref 5–10.5)
POTASSIUM REFLEX MAGNESIUM: 4.2 MMOL/L (ref 3.5–5.1)
RBC # BLD: 3.67 M/UL (ref 4.2–5.9)
SARS-COV-2, NAAT: NOT DETECTED
SODIUM BLD-SCNC: 134 MMOL/L (ref 136–145)
TOTAL PROTEIN: 6.4 G/DL (ref 6.4–8.2)
WBC # BLD: 10.9 K/UL (ref 4–11)

## 2020-11-09 PROCEDURE — 88305 TISSUE EXAM BY PATHOLOGIST: CPT

## 2020-11-09 PROCEDURE — 94761 N-INVAS EAR/PLS OXIMETRY MLT: CPT

## 2020-11-09 PROCEDURE — 85025 COMPLETE CBC W/AUTO DIFF WBC: CPT

## 2020-11-09 PROCEDURE — 6370000000 HC RX 637 (ALT 250 FOR IP): Performed by: INTERNAL MEDICINE

## 2020-11-09 PROCEDURE — 36415 COLL VENOUS BLD VENIPUNCTURE: CPT

## 2020-11-09 PROCEDURE — 80053 COMPREHEN METABOLIC PANEL: CPT

## 2020-11-09 PROCEDURE — 2580000003 HC RX 258: Performed by: INTERNAL MEDICINE

## 2020-11-09 PROCEDURE — 0BBK3ZX EXCISION OF RIGHT LUNG, PERCUTANEOUS APPROACH, DIAGNOSTIC: ICD-10-PCS | Performed by: RADIOLOGY

## 2020-11-09 PROCEDURE — 6360000002 HC RX W HCPCS: Performed by: RADIOLOGY

## 2020-11-09 PROCEDURE — 2700000000 HC OXYGEN THERAPY PER DAY

## 2020-11-09 PROCEDURE — 88341 IMHCHEM/IMCYTCHM EA ADD ANTB: CPT

## 2020-11-09 PROCEDURE — 32405 CT NEEDLE BIOPSY LUNG PERCUTANEOUS: CPT

## 2020-11-09 PROCEDURE — U0002 COVID-19 LAB TEST NON-CDC: HCPCS

## 2020-11-09 PROCEDURE — 6360000002 HC RX W HCPCS: Performed by: INTERNAL MEDICINE

## 2020-11-09 PROCEDURE — 88342 IMHCHEM/IMCYTCHM 1ST ANTB: CPT

## 2020-11-09 PROCEDURE — 71045 X-RAY EXAM CHEST 1 VIEW: CPT

## 2020-11-09 PROCEDURE — 2709999900 CT GUIDED NEEDLE PLACEMENT

## 2020-11-09 PROCEDURE — 93010 ELECTROCARDIOGRAM REPORT: CPT | Performed by: INTERNAL MEDICINE

## 2020-11-09 RX ORDER — FENTANYL CITRATE 50 UG/ML
INJECTION, SOLUTION INTRAMUSCULAR; INTRAVENOUS
Status: COMPLETED | OUTPATIENT
Start: 2020-11-09 | End: 2020-11-09

## 2020-11-09 RX ORDER — ACETAMINOPHEN 325 MG/1
650 TABLET ORAL EVERY 4 HOURS PRN
Status: DISCONTINUED | OUTPATIENT
Start: 2020-11-09 | End: 2020-11-09 | Stop reason: HOSPADM

## 2020-11-09 RX ORDER — INSULIN GLARGINE 100 [IU]/ML
10 INJECTION, SOLUTION SUBCUTANEOUS NIGHTLY
Qty: 1 VIAL | Refills: 3 | DISCHARGE
Start: 2020-11-09

## 2020-11-09 RX ORDER — TAMSULOSIN HYDROCHLORIDE 0.4 MG/1
0.4 CAPSULE ORAL DAILY
Qty: 30 CAPSULE | Refills: 3 | DISCHARGE
Start: 2020-11-10

## 2020-11-09 RX ORDER — ACETAMINOPHEN 325 MG/1
650 TABLET ORAL EVERY 6 HOURS PRN
Qty: 120 TABLET | Refills: 3 | DISCHARGE
Start: 2020-11-09

## 2020-11-09 RX ADMIN — FENTANYL CITRATE 100 MCG: 50 INJECTION INTRAMUSCULAR; INTRAVENOUS at 09:59

## 2020-11-09 RX ADMIN — FERROUS SULFATE TAB 325 MG (65 MG ELEMENTAL FE) 325 MG: 325 (65 FE) TAB at 11:11

## 2020-11-09 RX ADMIN — OXYCODONE HYDROCHLORIDE AND ACETAMINOPHEN 1 TABLET: 5; 325 TABLET ORAL at 12:47

## 2020-11-09 RX ADMIN — METOCLOPRAMIDE 5 MG: 10 TABLET ORAL at 12:46

## 2020-11-09 RX ADMIN — TORSEMIDE 100 MG: 100 TABLET ORAL at 11:12

## 2020-11-09 RX ADMIN — TAMSULOSIN HYDROCHLORIDE 0.4 MG: 0.4 CAPSULE ORAL at 11:11

## 2020-11-09 RX ADMIN — RANOLAZINE 500 MG: 500 TABLET, FILM COATED, EXTENDED RELEASE ORAL at 11:11

## 2020-11-09 RX ADMIN — COLLAGENASE SANTYL: 250 OINTMENT TOPICAL at 10:56

## 2020-11-09 RX ADMIN — ACETAMINOPHEN 650 MG: 325 TABLET ORAL at 16:55

## 2020-11-09 RX ADMIN — OXYCODONE HYDROCHLORIDE AND ACETAMINOPHEN 1 TABLET: 5; 325 TABLET ORAL at 04:32

## 2020-11-09 RX ADMIN — INSULIN LISPRO 1 UNITS: 100 INJECTION, SOLUTION INTRAVENOUS; SUBCUTANEOUS at 16:56

## 2020-11-09 RX ADMIN — ALLOPURINOL 300 MG: 300 TABLET ORAL at 11:11

## 2020-11-09 RX ADMIN — SODIUM CHLORIDE, PRESERVATIVE FREE 10 ML: 5 INJECTION INTRAVENOUS at 11:22

## 2020-11-09 RX ADMIN — POTASSIUM CHLORIDE 10 MEQ: 750 TABLET, EXTENDED RELEASE ORAL at 11:11

## 2020-11-09 RX ADMIN — CEFTRIAXONE SODIUM 1 G: 1 INJECTION, POWDER, FOR SOLUTION INTRAMUSCULAR; INTRAVENOUS at 11:17

## 2020-11-09 RX ADMIN — METOCLOPRAMIDE 5 MG: 10 TABLET ORAL at 16:55

## 2020-11-09 RX ADMIN — ATORVASTATIN CALCIUM 20 MG: 10 TABLET, FILM COATED ORAL at 11:11

## 2020-11-09 RX ADMIN — CARVEDILOL 3.12 MG: 3.12 TABLET, FILM COATED ORAL at 11:11

## 2020-11-09 ASSESSMENT — PAIN SCALES - GENERAL
PAINLEVEL_OUTOF10: 5
PAINLEVEL_OUTOF10: 10
PAINLEVEL_OUTOF10: 10

## 2020-11-09 NOTE — SEDATION DOCUMENTATION
Right lower lung biopsy completed without difficulty. Patient tolerated procedure well. Returned to prealdrete score. Report called to Hu MULLINS B3.  Returned to B3 per bed.

## 2020-11-09 NOTE — CARE COORDINATION
Chart reviewed day 4. Spoke with Dr Tiana Thompson. Lung biopsy today. Plan for return to LTC at [facility] when medically ready.  Will need rapid covid day of d/c. Divina Jaime RN

## 2020-11-09 NOTE — BRIEF OP NOTE
Brief Postoperative Note      Patient: Ceola Leaks  YOB: 1935  MRN: [id number]    Date of Procedure:     Pre-Op Diagnosis: * No surgery found *    Post-Op Diagnosis: Same           * Surgery not found *    Assistant:  * Surgery not found *    Anesthesia: * No surgery found *    Estimated Blood Loss (mL): Minimal    Complications: None    Specimens:   * Cannot find log *    Implants:  * No surgical log found *      Drains: * No LDAs found *    Findings: lung nodules    Electronically signed by Fartun Figueroa MD on 11/9/2020 at 10:58 AM

## 2020-11-09 NOTE — CARE COORDINATION
CM spoke with Dr Seven Adrian. Anticipate d/c tonight. Rapid covid ordered. CASE MANAGEMENT DISCHARGE SUMMARY      Discharge to: [facility]    Precertification completed: [facility] Exemption Notification (HENS) completed: not needed    [address] ordered/agency: none    Transportation:    Family/car:   Medical Transport explained to [email or web address]. Pt/family voice no agency preference. [facility]   time:     630pm   Ambulance form completed: Yes    Confirmed discharge plan with:     Patient: yes     Family, name and contact number: Ari Garnett [phone]     Facility/Agency, name:  [facility] faxed [phone]   Phone number for report to facility: [phone]     RN, name: Kristan Kvng    Note: Discharging nurse to complete [facility], reconcile [facility], and place final copy with patient's discharge packet. RN to ensure that written prescriptions for  Level II medications are sent with patient to the facility as per protocol.     Inessa Atwood RN

## 2020-11-09 NOTE — DISCHARGE SUMMARY
Hospital Medicine Discharge Summary    Patient: Yordan Weiss     Age: 81 y.o. Gender: male  : 1935   MRN: [id number]  Code Status: DNRCC-A    Admit Date: 2020   Discharge Date:   2020  Disposition:  [facility] ([facility])    Condition at Discharge: Stable    Primary Care Provider: Jeramie Dominguez2    Admitting Physician: Leopold Crandall, MD  Discharge Physician: Pérez Stanford MD       Discharge Diagnoses: Active Hospital Problems    Diagnosis    Atelectasis [J98.11]    Mediastinal adenopathy [R59.0]    Pyuria [R82.81]    Multiple lung nodules on CT [R91.8]    Multiple lung nodules [M22.5]    Diastolic dysfunction [G26.70]    Former smoker [Z13.378]    Stage 3 chronic kidney disease [N18.30]    Cardiomyopathy, ischemic [I25.5]       Hospital Course: The patient is an 81 Y M with a h/o former smoking (120 pack-years), COPD, HTN, HLD, DM2, CAD s/p CABG, ischemic CMP, CHF, Afib, and CKD3. He lives at the [address] [place] [address],[address]. He was reportedly sent here overnight because of altered mental status and BLE edema. Labs and vitals were somewhat unremarkable, but a CXR in the ED showed pulmonary nodules, and then a CT showed probable metastatic cancer. The patient was admitted to hospital medicine. Assessment/Plan:    Probable metastatic lung cancer  - reviewed prior imaging as well. Pulmonary consulted to guide biopsy. Dr. Michelle Xie discussed with family  and family requested biopsy to decide treatment plans depending on biopsy report. CT-guided biopsy per IR completed 20. He can follow-up as an outpatient with PCP/Oncology if needed for biopsy results.      Acute urinary retention. Acute cystitis ruled out. - straight catheterized for 900 ml in the ED. Monitor. Tamsulosin.    - ceftriaxone started , urine culture ended up being negative, so stopped abx.     Acute metabolic encephalopathy  - due to above issues, treat accordingly.  - MRI brain with Labs or Other Treatments After Discharge:    Lung Biopsy obtained 11/9/2020. Follow-up with medical Oncology for lung biopsy results, to discuss further management options. Discharge Medications:   Current Discharge Medication List      START taking these medications    Details   acetaminophen (TYLENOL) 325 MG tablet Take 2 tablets by mouth every 6 hours as needed for Pain  Qty: 120 tablet, Refills: 3      collagenase 250 UNIT/GM ointment Clean lower back and left buttocks wounds with foamy cleanser or normal saline. Apply santyl (star thick) to left buttocks and lower back. To lower back then apply lightly moist Normal saline soaked 2x2 dressing, cover both wounds with dry dressings and medipore tape. Change daily. Qty:        tamsulosin (FLOMAX) 0.4 MG capsule Take 1 capsule by mouth daily  Qty: 30 capsule, Refills: 3           Current Discharge Medication List      CONTINUE these medications which have CHANGED    Details   insulin glargine (LANTUS) 100 UNIT/ML injection vial Inject 10 Units into the skin nightly  Qty: 1 vial, Refills: 3           Current Discharge Medication List      CONTINUE these medications which have NOT CHANGED    Details   fluticasone-vilanterol (BREO ELLIPTA) 100-25 MCG/INH AEPB inhaler Inhale 1 puff into the lungs daily      nitroGLYCERIN (NITROSTAT) 0.4 MG SL tablet up to max of 3 total doses. If no relief after 1 dose, call 911. Qty: 25 tablet, Refills: 3      apixaban (ELIQUIS) 2.5 MG TABS tablet Take 1 tablet by mouth 2 times daily  Qty: 60 tablet, Refills: 2      ranolazine (RANEXA) 500 MG extended release tablet Take 500 mg by mouth 2 times daily      budesonide-formoterol (SYMBICORT) 160-4.5 MCG/ACT AERO Inhale 2 puffs into the lungs 2 times daily      albuterol (PROVENTIL HFA;VENTOLIN HFA) 108 (90 BASE) MCG/ACT inhaler Inhale 2 puffs into the lungs every 6 hours as needed.       ipratropium-albuterol (DUONEB) 0.5-2.5 (3) MG/3ML SOLN nebulizer solution Inhale 1 vial into the lungs 3 times daily       atorvastatin (LIPITOR) 20 MG tablet Take 20 mg by mouth daily. torsemide (DEMADEX) 20 MG tablet Take 5 tablets by mouth daily  Qty: 30 tablet, Refills: 2      Menthol, Topical Analgesic, (BIOFREEZE) 4 % GEL Apply topically every 8 hours as needed      ferrous sulfate 325 (65 Fe) MG tablet Take 325 mg by mouth 2 times daily      lidocaine (LIDODERM) 5 % Place 1 patch onto the skin daily 12 hours on, 12 hours off.      melatonin 3 MG TABS tablet Take 3 mg by mouth nightly as needed      omeprazole (PRILOSEC) 40 MG delayed release capsule Take 40 mg by mouth daily      sennosides-docusate sodium (SENOKOT-S) 8.6-50 MG tablet Take 1 tablet by mouth nightly      !! insulin lispro (HUMALOG) 100 UNIT/ML injection vial Inject 0-3 Units into the skin nightly  Qty: 1 vial, Refills: 3      !! insulin lispro (HUMALOG) 100 UNIT/ML injection vial Inject 0-6 Units into the skin 3 times daily (with meals)  Qty: 1 vial, Refills: 3      metoclopramide (REGLAN) 10 MG tablet Take 1 tablet by mouth daily  Qty: 120 tablet, Refills: 3      Multiple Vitamins-Minerals (CENTRUM SILVER PO) Take 1 tablet by mouth daily      benzonatate (TESSALON) 100 MG capsule Take 100 mg by mouth 3 times daily as needed for Cough      potassium chloride (KLOR-CON M) 20 MEQ extended release tablet Take 10 mEq by mouth daily      allopurinol (ZYLOPRIM) 300 MG tablet Take 300 mg by mouth daily      calcitRIOL (ROCALTROL) 0.25 MCG capsule Take 0.25 mcg by mouth See Admin Instructions On M,W,F      carvedilol (COREG) 3.125 MG tablet Take 1 tablet by mouth 2 times daily  Qty: 60 tablet, Refills: 0      polyethylene glycol (GLYCOLAX) packet Take 17 g by mouth every other day        !! - Potential duplicate medications found. Please discuss with provider.         Current Discharge Medication List      STOP taking these medications       metOLazone (ZAROXOLYN) 5 MG tablet Comments:   Reason for Stopping:         traZODone (DESYREL) 100 MG tablet Comments:   Reason for Stopping:                 Significant Test Results    Ct Head Wo Contrast    Result Date: 11/6/2020  EXAMINATION: CT OF THE HEAD WITHOUT CONTRAST  11/5/2020 2:49 am TECHNIQUE: CT of the head was performed without the administration of intravenous contrast. Dose modulation, iterative reconstruction, and/or weight based adjustment of the mA/kV was utilized to reduce the radiation dose to as low as reasonably achievable. COMPARISON: March 5, 2020 HISTORY: ORDERING SYSTEM PROVIDED HISTORY: AMS TECHNOLOGIST PROVIDED HISTORY: Reason for exam:->AMS Has a \"code stroke\" or \"stroke alert\" been called? ->No Reason for Exam: AMS FINDINGS: BRAIN/VENTRICLES: There is no acute intracranial hemorrhage, mass effect or midline shift. No abnormal extra-axial fluid collection. The gray-white differentiation is maintained without evidence of an acute infarct. There is no evidence of hydrocephalus. Mild periventricular and deep subcortical white matter hypodensity is present. Atrophy is present. ORBITS: The visualized portion of the orbits demonstrate no acute abnormality. SINUSES: The visualized paranasal sinuses and mastoid air cells demonstrate no acute abnormality. SOFT TISSUES/SKULL:  No acute abnormality of the visualized skull or soft tissues. No acute intracranial abnormality. Age related changes including chronic small vessel ischemic disease and cerebral atrophy. Ct Chest Wo Contrast    Result Date: 11/5/2020  EXAMINATION: CT OF THE CHEST WITHOUT CONTRAST 11/5/2020 2:49 am TECHNIQUE: CT of the chest was performed without the administration of intravenous contrast. Multiplanar reformatted images are provided for review. Dose modulation, iterative reconstruction, and/or weight based adjustment of the mA/kV was utilized to reduce the radiation dose to as low as reasonably achievable.  COMPARISON: 11/14/2017 HISTORY: ORDERING SYSTEM PROVIDED HISTORY: abnormal chest xray explanation of the procedure including risks, benefits, and alternatives. Universal protocol was observed. Xr Chest Portable    Result Date: 11/9/2020  EXAMINATION: ONE XRAY VIEW OF THE CHEST 11/9/2020 8:39 am COMPARISON: 11/09/2020 CT-guided lung biopsy. 11/05/2020 chest CT HISTORY: ORDERING SYSTEM PROVIDED HISTORY: post lung biopsy TECHNOLOGIST PROVIDED HISTORY: Reason for exam:->post lung biopsy Reason for Exam: post lung biopsy Type of Exam: Subsequent/Follow-up FINDINGS: Increased opacities at the right mid to lower lung zone, presumably postprocedural change. No postprocedural pneumothorax. Probable trace right effusion. Innumerable pulmonary nodules redemonstrated. Unchanged cardiomegaly with a left chest cardiac device. Severe degenerative changes of the right glenohumeral joint with bony remodeling. No acute bony findings. Postprocedural changes at the right mid to lower lung without postprocedural pneumothorax. Probable trace right pleural effusion. Innumerable pulmonary nodules most compatible with metastatic disease. Xr Chest Portable    Result Date: 11/5/2020  EXAMINATION: ONE XRAY VIEW OF THE CHEST 11/5/2020 1:24 am COMPARISON: 10/22/2019 HISTORY: ORDERING SYSTEM PROVIDED HISTORY: ams TECHNOLOGIST PROVIDED HISTORY: Reason for exam:->ams FINDINGS: Evaluation is limited by the patient's body habitus and the portable technique. There are ill-defined airspace opacities in both lungs with relative sparing of the apices. Some of these opacities have a nodular pattern. The heart size is moderately enlarged. Left chest biventricular AICD is again seen. There is no large pleural effusion or definite evidence for pneumothorax. Bilateral airspace opacities may represent pneumonia though there is also a suggestion of nodularity and a CT scan of the chest is recommended.      Ct Needle Biopsy Lung Percutaneous    Result Date: 11/9/2020  PROCEDURE: CT GUIDED NDL PLACEMENT; CT NEEDLE BIOPSY LUNG PERCUTANEOUS MODERATE CONSCIOUS SEDATION 11/9/2020 HISTORY: ORDERING SYSTEM PROVIDED HISTORY: multiple lung nodules, biopsy most accessible one TECHNOLOGIST PROVIDED HISTORY: Reason for exam:->multiple lung nodules, biopsy most accessible one Reason for Exam: right lung biopsy TECHNIQUE: Dose modulation, iterative reconstruction, and/or weight based adjustment of the mA/kV was utilized to reduce the radiation dose to as low as reasonably achievable. CONTRAST: None SEDATION: Moderate sedation with fentanyl was performed by the radiology nurse for approximately 15 minutes ESTIMATED BLOOD LOSS: Minimal FLUOROSCOPY DOSE AND TYPE OR TIME AND EXPOSURES: None DESCRIPTION OF PROCEDURE: Informed consent was obtained after a detailed explanation of the procedure including risks, benefits, and alternatives. Universal protocol was observed. Consults:     IP CONSULT TO HOSPITALIST  IP CONSULT TO PULMONOLOGY    Labs: For convenience and continuity at follow-up the following most recent labs are provided:    Lab Results   Component Value Date    WBC 10.9 11/09/2020    HGB 11.2 11/09/2020    HCT 34.4 11/09/2020    MCV 93.6 11/09/2020     11/09/2020     11/09/2020    K 4.2 11/09/2020    CL 97 11/09/2020    CO2 27 11/09/2020    BUN 40 11/09/2020    CREATININE 1.7 11/09/2020    CALCIUM 9.0 11/09/2020    PHOS 3.8 06/10/2019    BNP 65 10/14/2013    TROPONINI 0.03 11/06/2020    ALKPHOS 74 11/09/2020    ALT 11 11/09/2020    AST 32 11/09/2020    BILITOT 0.3 11/09/2020    BILIDIR <0.2 08/14/2018    LABALBU 3.1 11/09/2020    LDLCALC 64 01/09/2019    TRIG 82 01/09/2019    LABA1C 6.6 03/05/2020     Lab Results   Component Value Date    INR 1.11 11/05/2020    INR 1.22 (H) 01/08/2019    INR 1.09 05/20/2018         The patient was seen and examined on day of discharge and this discharge summary is in conjunction with any daily progress note from day of discharge.  Time spent on discharge is more than 30 minutes in the examination, evaluation, counseling and review of medications and discharge plan. Signed:    Devon Finnegan MD   11/9/2020    Thank you Jeramie Anderson for the opportunity to be involved in this patient's care. If you have any questions or concerns please feel free to contact my office [phone].

## 2020-11-09 NOTE — PROGRESS NOTES
This RN called and spoke to pt's AIDEE Grewal who stated she spoke with Dr. Harry Epstein. Cyrus Grewal gave verbal consent over the phone for Moi Dominguez to get the lung bx.

## 2020-11-10 NOTE — PROGRESS NOTES
Pt discharged to [facility] [place] via [id number] transport service.

## 2020-11-10 NOTE — ADT AUTH CERT
Urinary Tract Infection (UTI) - Care Day 4 (11/8/2020) by Mai Coleman, RN         Review Status  Review Entered    Completed  11/10/2020 09:53        Criteria Review       Care Day: 4 Care Date: 11/8/2020 Level of Care: Telemetry    Guideline Day 3    Clinical Status    (X) * Hemodynamic stability    11/10/2020 9:53 AM EST by Pa Rosas      97.8  16  94  123/77  97%  2 liter nc    ( ) * Mental status at baseline    ( ) * Antibiotic regimen for next level of care established    (X) * Urine output adequate    (X) * Renal function at baseline or acceptable for next level of care    ( ) * Pain absent or managed    11/10/2020 9:53 AM EST by Pa Rosas      rating back pain 10/10 continuous    (X) * Oral intake adequate    11/10/2020 9:53 AM EST by Pa Rosas      general dental soft diet    (X) * Afebrile or temperature acceptable for next level of care    (X) * Vomiting absent    ( ) * Discharge plans and education understood    Activity    (X) * Ambulatory or acceptable for next level of care    Routes    (X) * Oral hydration, medications, [J] and diet    11/10/2020 9:53 AM EST by Pa Rosas      symbicort bid  demadex 100mg qd  reglan 5 mg qid  iron tab bid  coreg 3.25 mh bid    Medications    (X) Antibiotics [K]    11/10/2020 9:53 AM EST by Pa Rosas      rocephin 1 gm iv q24hrs    (X) Possible analgesics    11/10/2020 9:53 AM EST by Pa Rosas      tylenol 650 mg given  percocet 1 tab is given x4 doses    * Milestone        Urinary Tract Infection (UTI) - Care Day 3 (11/7/2020) by Mai Coleman, RN         Review Status  Review Entered    Completed  11/10/2020 09:47        Criteria Review       Care Day: 3 Care Date: 11/7/2020 Level of Care: Telemetry    Guideline Day 3    Clinical Status    (X) * Hemodynamic stability    11/10/2020 9:47 AM EST by Pa Rosas      98.1  20  98  106/67  94%ra    wbc  13.2    ( ) * Mental status at baseline    ( ) * Antibiotic regimen for next level of care established    ( ) * Urine output adequate    ( ) * Renal function at baseline or acceptable for next level of care    11/10/2020 9:47 AM EST by Arnol Arias      Na  659  bun 43  creat 1.7    ( ) * Pain absent or managed    11/10/2020 9:47 AM EST by Arnol Arias      rates pain 9/10, 10/10    ( ) * Oral intake adequate    (X) * Afebrile or temperature acceptable for next level of care    ( ) * Vomiting absent    ( ) * Discharge plans and education understood    Activity    ( ) * Ambulatory or acceptable for next level of care    Routes    (X) * Oral hydration, medications, [J] and diet    11/10/2020 9:47 AM EST by Arnol Arias      reglan 5 mg qid  demadex 100mg qd  flomax 0.4 mg qd    Medications    (X) Antibiotics [K]    11/10/2020 9:47 AM EST by Arnol Arias      rocephin 1gm iv q24hrs    (X) Possible analgesics    11/10/2020 9:47 AM EST by Sun Hogan tylenol 650mg is given x2 doses  percocet is given x2 doses    * Milestone    Additional Notes    11/7    Probable metastatic lung cancer    - reviewed prior imaging as well.  Pulmonary consulted to guide biopsy and help determine when this workup can transition to the outpatient setting.  CT-guided biopsy per IR Monday

## 2020-11-14 ENCOUNTER — HOSPITAL ENCOUNTER (EMERGENCY)
Age: 85
Discharge: NURSING, REHAB OR LONG-TERM CARE | End: 2020-11-15
Attending: EMERGENCY MEDICINE
Payer: MEDICARE

## 2020-11-14 ENCOUNTER — APPOINTMENT (OUTPATIENT)
Dept: CT IMAGING | Age: 85
End: 2020-11-14
Payer: MEDICARE

## 2020-11-14 ENCOUNTER — APPOINTMENT (OUTPATIENT)
Dept: GENERAL RADIOLOGY | Age: 85
End: 2020-11-14
Payer: MEDICARE

## 2020-11-14 LAB
A/G RATIO: 1.3 (ref 1.1–2.2)
ALBUMIN SERPL-MCNC: 4 G/DL (ref 3.4–5)
ALP BLD-CCNC: 89 U/L (ref 40–129)
ALT SERPL-CCNC: 14 U/L (ref 10–40)
ANION GAP SERPL CALCULATED.3IONS-SCNC: 9 MMOL/L (ref 3–16)
AST SERPL-CCNC: 27 U/L (ref 15–37)
BASOPHILS ABSOLUTE: 0.1 K/UL (ref 0–0.2)
BASOPHILS RELATIVE PERCENT: 0.6 %
BILIRUB SERPL-MCNC: 0.4 MG/DL (ref 0–1)
BUN BLDV-MCNC: 46 MG/DL (ref 7–20)
CALCIUM SERPL-MCNC: 9.3 MG/DL (ref 8.3–10.6)
CHLORIDE BLD-SCNC: 97 MMOL/L (ref 99–110)
CO2: 32 MMOL/L (ref 21–32)
CREAT SERPL-MCNC: 1.8 MG/DL (ref 0.8–1.3)
EOSINOPHILS ABSOLUTE: 0.3 K/UL (ref 0–0.6)
EOSINOPHILS RELATIVE PERCENT: 2.6 %
GFR AFRICAN AMERICAN: 44
GFR NON-AFRICAN AMERICAN: 36
GLOBULIN: 3.1 G/DL
GLUCOSE BLD-MCNC: 138 MG/DL (ref 70–99)
HCT VFR BLD CALC: 33.1 % (ref 40.5–52.5)
HEMOGLOBIN: 10.9 G/DL (ref 13.5–17.5)
LYMPHOCYTES ABSOLUTE: 2 K/UL (ref 1–5.1)
LYMPHOCYTES RELATIVE PERCENT: 20 %
MCH RBC QN AUTO: 30.4 PG (ref 26–34)
MCHC RBC AUTO-ENTMCNC: 32.9 G/DL (ref 31–36)
MCV RBC AUTO: 92.4 FL (ref 80–100)
MONOCYTES ABSOLUTE: 1.5 K/UL (ref 0–1.3)
MONOCYTES RELATIVE PERCENT: 14.8 %
NEUTROPHILS ABSOLUTE: 6.2 K/UL (ref 1.7–7.7)
NEUTROPHILS RELATIVE PERCENT: 62 %
PDW BLD-RTO: 18.1 % (ref 12.4–15.4)
PLATELET # BLD: 215 K/UL (ref 135–450)
PMV BLD AUTO: 8.6 FL (ref 5–10.5)
POTASSIUM REFLEX MAGNESIUM: 3.7 MMOL/L (ref 3.5–5.1)
RBC # BLD: 3.58 M/UL (ref 4.2–5.9)
SODIUM BLD-SCNC: 138 MMOL/L (ref 136–145)
TOTAL PROTEIN: 7.1 G/DL (ref 6.4–8.2)
WBC # BLD: 10 K/UL (ref 4–11)

## 2020-11-14 PROCEDURE — 80053 COMPREHEN METABOLIC PANEL: CPT

## 2020-11-14 PROCEDURE — 73030 X-RAY EXAM OF SHOULDER: CPT

## 2020-11-14 PROCEDURE — 85025 COMPLETE CBC W/AUTO DIFF WBC: CPT

## 2020-11-14 PROCEDURE — 70450 CT HEAD/BRAIN W/O DYE: CPT

## 2020-11-14 PROCEDURE — 93005 ELECTROCARDIOGRAM TRACING: CPT

## 2020-11-14 PROCEDURE — 99284 EMERGENCY DEPT VISIT MOD MDM: CPT

## 2020-11-15 VITALS
TEMPERATURE: 98.5 F | DIASTOLIC BLOOD PRESSURE: 57 MMHG | SYSTOLIC BLOOD PRESSURE: 116 MMHG | RESPIRATION RATE: 17 BRPM | OXYGEN SATURATION: 98 % | BODY MASS INDEX: 34.06 KG/M2 | WEIGHT: 224 LBS | HEART RATE: 85 BPM

## 2020-11-15 LAB
EKG ATRIAL RATE: 87 BPM
EKG DIAGNOSIS: NORMAL
EKG P-R INTERVAL: 166 MS
EKG Q-T INTERVAL: 426 MS
EKG QRS DURATION: 144 MS
EKG QTC CALCULATION (BAZETT): 512 MS
EKG R AXIS: -82 DEGREES
EKG T AXIS: 43 DEGREES
EKG VENTRICULAR RATE: 87 BPM

## 2020-11-15 NOTE — ED PROVIDER NOTES
Emergency Department Provider Note     Location: [facility]  ED  11/14/2020    I independently performed a history and physical on Francine Sparrow. All diagnostic, treatment, and disposition decisions were made by myself in conjunction with the mid-level provider. Briefly, this is a 81 y.o. male here for altered mental status. Patient resides at [facility] and per EMS had been hallucinating and having an increase in altered mental status over the past few days. Patient had a fall at the nursing home yesterday nursing staff stated that he did not hit his head or have loss of consciousness. They also state he was not on anticoagulants but patient does still have Eliquis listed in his medication list.  Patient reports that he has pain all over. He is oriented to person and to place but not to time. He denies any chest pain or shortness of breath. ED Triage Vitals [11/14/20 2023]   BP Temp Temp Source Pulse Resp SpO2 Height Weight   109/70 98.5 °F (36.9 °C) Oral 86 18 99 % -- --        Patient resting comfortably in no acute distress. Heart is regular rate and rhythm. Lungs clear to auscultation bilaterally. Abdomen is soft, nondistended, and nontender. Patient follows commands appropriately but does seem slightly confused. Patient seen and examined. Vital signs stable and within normal limits. Physical exam as documented above. Lab work-up largely unremarkable with creatinine similar to previous. CT head without acute intracranial abnormality. Patient symptoms are likely related to his ongoing chronic medical conditions. No acute changes that would warrant admission at this time. Patient currently resides in a nursing facility and once results from biopsy obtained he will likely transition to hospice care. Patient discharged back to facility. EKG  The Ekg interpreted by me in the absence of a cardiologist shows.   Paced rhythm, rate 87, no STEMI      Xr Shoulder Right (min 2 Views)    Result Date: 11/14/2020  EXAMINATION: THREE XRAY VIEWS OF THE RIGHT SHOULDER 11/14/2020 9:02 pm COMPARISON: None. HISTORY: ORDERING SYSTEM PROVIDED HISTORY: fall TECHNOLOGIST PROVIDED HISTORY: Reason for exam:->fall Reason for Exam: fall Acuity: Acute Type of Exam: Initial FINDINGS: There is severe degenerative changes of the right shoulder with bone-on-bone contact. No acute fracture or dislocation is identified. There appears to be pulmonary vascular congestion. No radiographic evidence of fracture. 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 Incidentally noted pulmonary vascular congestion. Ct Head Wo Contrast    Result Date: 11/14/2020  EXAMINATION: CT OF THE HEAD WITHOUT CONTRAST  11/14/2020 10:12 pm TECHNIQUE: CT of the head was performed without the administration of intravenous contrast. Dose modulation, iterative reconstruction, and/or weight based adjustment of the mA/kV was utilized to reduce the radiation dose to as low as reasonably achievable. COMPARISON: November 5, 2020 HISTORY: ORDERING SYSTEM PROVIDED HISTORY: Lung cancer, new diagnosis, worsening AMS TECHNOLOGIST PROVIDED HISTORY: Reason for exam:->Lung cancer, new diagnosis, worsening AMS Has a \"code stroke\" or \"stroke alert\" been called? ->No Reason for Exam: Lung cancer, new diagnosis, worsening AMS Acuity: Acute Type of Exam: Initial Altered mental status, confusion FINDINGS: BRAIN/VENTRICLES: No acute blood products, shift of the midline structures, or CT evidence of acute infarct. Diffuse cortical volume loss and compensatory ventricular enlargement appears unchanged. Periventricular white matter hypoattenuation redemonstrated compatible with chronic microvascular ischemic changes. ORBITS: The visualized portion of the orbits demonstrate no acute abnormality. SINUSES: The visualized paranasal sinuses and mastoid air cells demonstrate no acute abnormality.  SOFT TISSUES/SKULL:  No acute abnormality of the visualized skull or soft tissues. No acute intracranial abnormality. Results for orders placed or performed during the hospital encounter of 11/14/20   CBC Auto Differential   Result Value Ref Range    WBC 10.0 4.0 - 11.0 K/uL    RBC 3.58 (L) 4.20 - 5.90 M/uL    Hemoglobin 10.9 (L) 13.5 - 17.5 g/dL    Hematocrit 33.1 (L) 40.5 - 52.5 %    MCV 92.4 80.0 - 100.0 fL    MCH 30.4 26.0 - 34.0 pg    MCHC 32.9 31.0 - 36.0 g/dL    RDW 18.1 (H) 12.4 - 15.4 %    Platelets 687 966 - 664 K/uL    MPV 8.6 5.0 - 10.5 fL    Neutrophils % 62.0 %    Lymphocytes % 20.0 %    Monocytes % 14.8 %    Eosinophils % 2.6 %    Basophils % 0.6 %    Neutrophils Absolute 6.2 1.7 - 7.7 K/uL    Lymphocytes Absolute 2.0 1.0 - 5.1 K/uL    Monocytes Absolute 1.5 (H) 0.0 - 1.3 K/uL    Eosinophils Absolute 0.3 0.0 - 0.6 K/uL    Basophils Absolute 0.1 0.0 - 0.2 K/uL   Comprehensive Metabolic Panel w/ Reflex to MG   Result Value Ref Range    Sodium 138 136 - 145 mmol/L    Potassium reflex Magnesium 3.7 3.5 - 5.1 mmol/L    Chloride 97 (L) 99 - 110 mmol/L    CO2 32 21 - 32 mmol/L    Anion Gap 9 3 - 16    Glucose 138 (H) 70 - 99 mg/dL    BUN 46 (H) 7 - 20 mg/dL    CREATININE 1.8 (H) 0.8 - 1.3 mg/dL    GFR Non- 36 (A) >60    GFR  44 (A) >60    Calcium 9.3 8.3 - 10.6 mg/dL    Total Protein 7.1 6.4 - 8.2 g/dL    Alb 4.0 3.4 - 5.0 g/dL    Albumin/Globulin Ratio 1.3 1.1 - 2.2    Total Bilirubin 0.4 0.0 - 1.0 mg/dL    Alkaline Phosphatase 89 40 - 129 U/L    ALT 14 10 - 40 U/L    AST 27 15 - 37 U/L    Globulin 3.1 g/dL       For further details of [facility] emergency department encounter, please see Glenda Wong's documentation. This chart was generated in part by using Dragon Dictation system and may contain errors related to that system including errors in grammar, punctuation, and spelling, as well as words and phrases that may be inappropriate.  If there are any questions or concerns please feel free to

## 2020-11-15 NOTE — ED NOTES
Discharge: Pt discharged to home as per order. IV removed. Instructions given. Caregiver verbalized understanding. Denied questions.        Luly Kaminski RN  11/15/20 [id number]

## 2020-11-15 NOTE — ED NOTES
Bed: 04  Expected date: 11/14/20  Expected time:   Means of arrival:   Comments:   [facility]  11/14/20 2019

## 2020-11-15 NOTE — ED PROVIDER NOTES
(chronic kidney disease), stage III     Davin Gonzalez MD, [facility], ([phone] COPD (chronic obstructive pulmonary disease) ([facility] [address]     Diabetes mellitus ([facility] [address]     Hyperlipidemia     Hypertension     Pacemaker     ICD         SURGICAL HISTORY:      Past Surgical History:   Procedure Laterality Date    ABDOMEN SURGERY  10/15/13    with j tube and lopez tube    BRONCHOSCOPY N/A 10/23/2019    BRONCHOSCOPY DIAGNOSTIC OR CELL [address] ONLY performed by Kurt Baumgarten, MD at [place] [id number] CT NEEDLE BIOPSY LUNG PERCUTANEOUS  11/9/2020    CT NEEDLE BIOPSY LUNG PERCUTANEOUS 11/9/2020 Alejo Rivera MD [facility] CT SCAN    SKIN BIOPSY           CURRENT MEDICATIONS:       Discharge Medication List as of 11/14/2020 11:20 PM      CONTINUE these medications which have NOT CHANGED    Details   acetaminophen (TYLENOL) 325 MG tablet Take 2 tablets by mouth every 6 hours as needed for Pain, Disp-120 tablet,R-3DC to [facility]      collagenase 250 UNIT/GM ointment Clean lower back and left buttocks wounds with foamy cleanser or normal saline. Apply santyl (star thick) to left buttocks and lower back. To lower back then apply lightly moist Normal saline soaked 2x2 dressing, cover both wounds with dry dressings  and medipore tape. Change daily. , DC to SNF      insulin glargine (LANTUS) 100 UNIT/ML injection vial Inject 10 Units into the skin nightly, Disp-1 vial,R-3DC to SNF      tamsulosin (FLOMAX) 0.4 MG capsule Take 1 capsule by mouth daily, Disp-30 capsule,R-3DC to SNF      torsemide (DEMADEX) 20 MG tablet Take 5 tablets by mouth daily, Disp-30 tablet, R-2NO PRINT      Menthol, Topical Analgesic, (BIOFREEZE) 4 % GEL Apply topically every 8 hours as neededHistorical Med      fluticasone-vilanterol (BREO ELLIPTA) 100-25 MCG/INH AEPB inhaler Inhale 1 puff into the lungs dailyHistorical Med      ferrous sulfate 325 (65 Fe) MG tablet Take 325 mg by mouth 2 times dailyHistorical Med      lidocaine (LIDODERM) 5 % Place 1 patch onto the skin daily 12 hours on, 12 hours off. Historical Med      melatonin 3 MG TABS tablet Take 3 mg by mouth nightly as neededHistorical Med      omeprazole (PRILOSEC) 40 MG delayed release capsule Take 40 mg by mouth dailyHistorical Med      sennosides-docusate sodium (SENOKOT-S) 8.6-50 MG tablet Take 1 tablet by mouth nightlyHistorical Med      !! insulin lispro (HUMALOG) 100 UNIT/ML injection vial Inject 0-3 Units into the skin nightly, Disp-1 vial, R-3DC to SNF      !! insulin lispro (HUMALOG) 100 UNIT/ML injection vial Inject 0-6 Units into the skin 3 times daily (with meals), Disp-1 vial, R-3DC to SNF      metoclopramide (REGLAN) 10 MG tablet Take 1 tablet by mouth daily, Disp-120 tablet, R-3DC to SNF      nitroGLYCERIN (NITROSTAT) 0.4 MG SL tablet up to max of 3 total doses.  If no relief after 1 dose, call 911., Disp-25 tablet, R-3DC to SNF      Multiple Vitamins-Minerals (CENTRUM SILVER PO) Take 1 tablet by mouth dailyHistorical Med      benzonatate (TESSALON) 100 MG capsule Take 100 mg by mouth 3 times daily as needed for CoughHistorical Med      potassium chloride (KLOR-CON M) 20 MEQ extended release tablet Take 10 mEq by mouth dailyHistorical Med      allopurinol (ZYLOPRIM) 300 MG tablet Take 300 mg by mouth dailyHistorical Med      calcitRIOL (ROCALTROL) 0.25 MCG capsule Take 0.25 mcg by mouth See Admin Instructions On M,W,FHistorical Med      apixaban (ELIQUIS) 2.5 MG TABS tablet Take 1 tablet by mouth 2 times daily, Disp-60 tablet, R-2Normal      carvedilol (COREG) 3.125 MG tablet Take 1 tablet by mouth 2 times daily, Disp-60 tablet, R-0Print      polyethylene glycol (GLYCOLAX) packet Take 17 g by mouth every other day Historical Med      ranolazine (RANEXA) 500 MG extended release tablet Take 500 mg by mouth 2 times dailyHistorical Med      budesonide-formoterol (SYMBICORT) 160-4.5 MCG/ACT AERO Inhale 2 puffs into the lungs 2 times dailyHistorical Med albuterol (PROVENTIL HFA;VENTOLIN HFA) 108 (90 BASE) MCG/ACT inhaler Inhale 2 puffs into the lungs every 6 hours as needed. ipratropium-albuterol (DUONEB) 0.5-2.5 (3) MG/3ML SOLN nebulizer solution Inhale 1 vial into the lungs 3 times daily Historical Med      atorvastatin (LIPITOR) 20 MG tablet Take 20 mg by mouth daily. !! - Potential duplicate medications found. Please discuss with provider.             ALLERGIES:    Iv [iodides]    FAMILY HISTORY:       Family History   Problem Relation Age of Onset    Coronary Art Dis Brother     Diabetes Father     Stroke Father     High Blood Pressure Father     Asthma Mother     Coronary Art Dis Mother     Diabetes Sister     Coronary Art Dis Sister           SOCIAL HISTORY:     Social History     Socioeconomic History    Marital status:      Spouse name: None    Number of children: 11    Years of education: None    Highest education level: None   Occupational History    None   Social Needs    Financial resource strain: None    Food insecurity     Worry: None     Inability: None    Transportation needs     Medical: None     Non-medical: None   Tobacco Use    Smoking status: Former Smoker     Packs/day: 2.00     Years: 60.00     Pack years: 120.00     Types: Cigarettes     Last attempt to quit: 10/14/2013     Years since quittin.0    Smokeless tobacco: Never Used   Substance and Sexual Activity    Alcohol use: No    Drug use: No    Sexual activity: Not Currently   Lifestyle    Physical activity     Days per week: None     Minutes per session: None    Stress: None   Relationships    Social connections     Talks on phone: None     Gets together: None     Attends Confucianist service: None     Active member of club or organization: None     Attends meetings of clubs or organizations: None     Relationship status: None    Intimate partner violence     Fear of current or ex partner: None     Emotionally abused: None     Physically answers most questions appropriately.         DIAGNOSTIC RESULTS:     LABS:    Results for orders placed or performed during the hospital encounter of 11/14/20   CBC Auto Differential   Result Value Ref Range    WBC 10.0 4.0 - 11.0 K/uL    RBC 3.58 (L) 4.20 - 5.90 M/uL    Hemoglobin 10.9 (L) 13.5 - 17.5 g/dL    Hematocrit 33.1 (L) 40.5 - 52.5 %    MCV 92.4 80.0 - 100.0 fL    MCH 30.4 26.0 - 34.0 pg    MCHC 32.9 31.0 - 36.0 g/dL    RDW 18.1 (H) 12.4 - 15.4 %    Platelets 336 942 - 104 K/uL    MPV 8.6 5.0 - 10.5 fL    Neutrophils % 62.0 %    Lymphocytes % 20.0 %    Monocytes % 14.8 %    Eosinophils % 2.6 %    Basophils % 0.6 %    Neutrophils Absolute 6.2 1.7 - 7.7 K/uL    Lymphocytes Absolute 2.0 1.0 - 5.1 K/uL    Monocytes Absolute 1.5 (H) 0.0 - 1.3 K/uL    Eosinophils Absolute 0.3 0.0 - 0.6 K/uL    Basophils Absolute 0.1 0.0 - 0.2 K/uL   Comprehensive Metabolic Panel w/ Reflex to MG   Result Value Ref Range    Sodium 138 136 - 145 mmol/L    Potassium reflex Magnesium 3.7 3.5 - 5.1 mmol/L    Chloride 97 (L) 99 - 110 mmol/L    CO2 32 21 - 32 mmol/L    Anion Gap 9 3 - 16    Glucose 138 (H) 70 - 99 mg/dL    BUN 46 (H) 7 - 20 mg/dL    CREATININE 1.8 (H) 0.8 - 1.3 mg/dL    GFR Non- 36 (A) >60    GFR  44 (A) >60    Calcium 9.3 8.3 - 10.6 mg/dL    Total Protein 7.1 6.4 - 8.2 g/dL    Alb 4.0 3.4 - 5.0 g/dL    Albumin/Globulin Ratio 1.3 1.1 - 2.2    Total Bilirubin 0.4 0.0 - 1.0 mg/dL    Alkaline Phosphatase 89 40 - 129 U/L    ALT 14 10 - 40 U/L    AST 27 15 - 37 U/L    Globulin 3.1 g/dL   EKG 12 Lead   Result Value Ref Range    Ventricular Rate 87 BPM    Atrial Rate 87 BPM    P-R Interval 166 ms    QRS Duration 144 ms    Q-T Interval 426 ms    QTc Calculation (Bazett) 512 ms    R Axis -82 degrees    T Axis 43 degrees    Diagnosis        Poor data quality, interpretation may be adversely affectedElectronic ventricular pacemakerConfirmed by Jesse Herman MD, Alyse Shanks ([id number]) on 11/15/2020 7:54:34 AM RADIOLOGY:  All x-ray studies are viewed/reviewed by me. Formal interpretations per the radiologist are as follows:      CT Head WO Contrast   Final Result   No acute intracranial abnormality. XR SHOULDER RIGHT (MIN 2 VIEWS)   Final Result   No radiographic evidence of fracture. 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 Incidentally noted pulmonary vascular congestion. EKG:  See EKG interpretation by an attending physician. PROCEDURES:   N/A    CRITICAL CARE TIME:   N/A    CONSULTS:  None      EMERGENCY DEPARTMENT COURSE andDIFFERENTIAL DIAGNOSIS/MDM:   Vitals:    Vitals:    11/14/20 2023 11/14/20 2218 11/14/20 2325 11/15/20 0051   BP: 109/70 105/70 127/77 (!) 116/57   Pulse: 86 92 87 85   Resp: 18 16 17 17   Temp: 98.5 °F (36.9 °C)      TempSrc: Oral      SpO2: 99% 99% 100% 98%   Weight: 224 lb (101.6 kg)          Patient wasgiven the following medications:  Medications - No data to display      Patient was evaluated in conjunction with Dr. Chase Oneal. Patient presented to the emergency room today after being sent from nursing facility for confusion. Patient is a DNR, has a history of what appears to be metastatic lung cancer, had a biopsy here to confirm. Patient tells me that they are not treating this. I did consult the nurse practitioner who sent the patient to the ED, she states that she does not actually know the patient that the nursing staff wanted him transferred out. We discussed plan of care on the patient as we saw no indications that he required any acute emergent intervention. The NP states that the nurses told her that patient is going to go hospice if the biopsy came back positive for cancer which it has. In any course patient is discharged back to the nursing facility. He did have a work-up today initiated by the attending physician which showed no leukocytosis, CT of the head showed no evidence of intracranial injury.   Patient was discharged in good condition. Radiographic imaging of the right shoulder was also negative. Patient laboratory studies, radiographic imaging, and assessment were all discussed with the patient and/orpatient family. There was shared decision-making between myself as well as the patient and/or their surrogate and we are all in agreement with discharge home. There was an opportunity for questions and all questions were answered tothe best of my ability and to the satisfaction of the patient and/or patient family. FINAL IMPRESSION:      1. Altered mental status, unspecified altered mental status type    2.  Contusion of right shoulder, initial encounter          DISPOSITION/PLAN:   DISPOSITION Decision To Discharge      PATIENT REFERRED TO:  Jc Worthy [address] [facility]  [phone]    Call   For follow up      DISCHARGE MEDICATIONS:  Discharge Medication List as of 11/14/2020 11:20 PM                     (Please note thatportions of this note were completed with a voice recognition program.  Efforts were made to edit the dictations, but occasionally words are mis-transcribed.)    LISSETTE Nugent CNP-C (electronicallysigned)        LISSETTE Nugent CNP  11/15/20 8068

## (undated) DEVICE — SINGLE USE BIOPSY VALVE MAJ-210: Brand: SINGLE USE BIOPSY VALVE (STERILE)

## (undated) DEVICE — TRAP,MUCUS SPECIMEN, 80CC: Brand: MEDLINE

## (undated) DEVICE — TUBING, SUCTION, 3/16" X 12', STRAIGHT: Brand: MEDLINE

## (undated) DEVICE — CONMED SCOPE SAVER BITE BLOCK, 20X27 MM: Brand: SCOPE SAVER

## (undated) DEVICE — FRAME EYEWR PROTCT ASST CLR DISP SAFEVIEW

## (undated) DEVICE — Z DISCONTINUED USE 2276105 GOWN PROTCT UNIV CHST W28IN L49IN SL 24IN BLU SPUNBOND FLM

## (undated) DEVICE — CANNULA,OXY,ADULT,SUPERSOFT,W/7'TUB,SC: Brand: MEDLINE INDUSTRIES, INC.

## (undated) DEVICE — LINER,SOFT,SUCTION CANISTER,1500CC: Brand: MEDLINE

## (undated) DEVICE — ELECTRODE,RADIOTRANSLUCENT,FOAM,3PK: Brand: MEDLINE

## (undated) DEVICE — SYRINGE MED 10ML SLIP TIP BLNT FILL AND LUERLOCK DISP

## (undated) DEVICE — TUBING, SUCTION, 3/16" X 6', STRAIGHT: Brand: MEDLINE

## (undated) DEVICE — SYRINGE MED 50ML LUERSLIP TIP

## (undated) DEVICE — BOWL MED M 16OZ PLAS CAP GRAD

## (undated) DEVICE — SINGLE USE SUCTION VALVE MAJ-209: Brand: SINGLE USE SUCTION VALVE (STERILE)

## (undated) DEVICE — YANKAUER,BULB TIP,W/O VENT,RIGID,STERILE: Brand: MEDLINE

## (undated) DEVICE — KIT INF CTRL 2OZ LUB TBNG L12FT DBL END BRSH SYR OP4